# Patient Record
Sex: FEMALE | Race: WHITE | Employment: FULL TIME | URBAN - NONMETROPOLITAN AREA
[De-identification: names, ages, dates, MRNs, and addresses within clinical notes are randomized per-mention and may not be internally consistent; named-entity substitution may affect disease eponyms.]

---

## 2017-11-01 ENCOUNTER — HOSPITAL ENCOUNTER (OUTPATIENT)
Dept: PHYSICAL THERAPY | Age: 52
Setting detail: THERAPIES SERIES
Discharge: HOME | End: 2017-11-01
Payer: COMMERCIAL

## 2017-11-01 PROCEDURE — 97140 MANUAL THERAPY 1/> REGIONS: CPT

## 2017-11-01 PROCEDURE — 97162 PT EVAL MOD COMPLEX 30 MIN: CPT

## 2017-11-01 ASSESSMENT — PAIN DESCRIPTION - LOCATION: LOCATION: BACK

## 2017-11-01 ASSESSMENT — PAIN DESCRIPTION - PAIN TYPE: TYPE: CHRONIC PAIN

## 2017-11-01 ASSESSMENT — PAIN SCALES - GENERAL: PAINLEVEL_OUTOF10: 4

## 2017-11-01 ASSESSMENT — PAIN DESCRIPTION - ORIENTATION: ORIENTATION: LOWER

## 2017-11-01 NOTE — PROGRESS NOTES
steps, balance  Other (Comment): script: eval and treat     Subjective: patient reports she has bilat thigh discomfort into back. She \"threw\" her back out in May 2017 and did it again 1 month later again. feels she does not have strength in her R leg, -she was bent over sorting laundry. Had Chiro at that time-it was helpful. Weakening of legs is reason she came to therapy, will catch R toes at times      Pain:  Patient Currently in Pain: Yes  Pain Assessment: 0-10  Pain Level: 4  Pain Type: Chronic pain  Pain Location: Back  Pain Orientation: Lower  Pain Radiating Towards: anterior thighs will feel over stretched/over worked. 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 Objective  Overall Orientation Status: Within Normal Limits    Follows Commands: Within Functional Limits        Vision: Within Functional Limits    Hearing: Within functional limits      Observation/Palpation  Posture: Fair  Palpation: mod tightness/tenderness bilat gluts and piraformis. R lumbar paraspinals and quadratur lumborum. mod tender over sacrum and L 4 -5. appears to have left-on-left sacral torsion and lumbar vertebra rotated R.   Observation: mod fwd head and shoulders-can correct with VC's. shoulders and pelvis level, min genuvalgus bilat. has orthotics bilat shoes. Spine: trunk flexion to ankle flexion/extension mobility WFL, lateral bend bilat decreased 25%.    ROM RLE: dorsiflexion WFL, Active SLR to approx 40 degrees, passively >70 degrees with R buttock/SI pain, pulling posterior R knee  ROM LLE: dorsiflexion WFL, active SRL>75 degrees    Strength RLE: WFL  Comment: hip flexion 4-/5, abduction, e rotation of hip 4+/5, knee flexion and extension 4+/5    Strength LLE: WFL  Comment: hip flexion 4-/5, abduction, e rotation of hip 4+/5, knee flexion and extension 4+/5         Special Tests: slight difficulty with heel/toe walking due to feet. Positive SAULO R with SI /back discomfort. questionable positive SLR R at 45 degrees. Double knee to chest: 20 reps eliminated symptoms. Trunk rolls: noted a little R SI discomfort roling to the R. prone: no symptoms after 5 min-choses this as position of symptom relief. Ambulation 1  Surface: carpet  Device: No Device  Assistance: Independent  Quality of Gait: decreased massimo  Distance: in clinic      Stairs  Comment: reciprocally wiwth 1 handrail and slight difficulty ascending steps     Balance  Comments: SLS L=28 , R=28 sec, tandem stance >30 sec           Exercises  Exercise 1: manual therpay: muscle energy for left-on-left sacral torsion  Exercise 2: seated piraformis and knee to axilla: 3 ea R  Exercise 3: double knee to chest X 20  Exercise 4: prone X 3 min  Exercise 5: core strength: next visit  Exercise 6: LE strength: next visit          Activity Tolerance:  Activity Tolerance: Patient Tolerated treatment well    Assessment: Body structures, Functions, Activity limitations: Decreased functional mobility , Decreased ROM, Decreased strength  Assessment: patient has limitation related to diagnosis and sacral torsion today. pain eliminated, walking ease mproved, Le strength with hip flexion 4 to 4+/5 after manual therapy today  Prognosis: Excellent  REQUIRES PT FOLLOW UP: Yes    Patient Education:  Patient Education: piraformis and knee to axilla stretch, double knee to chest prone lying  Barriers to Learning: none    Plan:  Times per week: 2x/week  Times per day: Daily  Plan weeks: 4 weeks  Specific instructions for Next Treatment: manual therapy, massage, modalities PRN.  ROm, stretching, strength, 5/5, tolerate 10 reps of 5 core stength ex for symptom control with all ADL's  Long term goal 4: I HEP to achieve above goals    PT G-Codes  Functional Assessment Tool Used: patient specific functional scale  Score: walking 4, steps 4 , balance 6, sit to stand 5: 19/4=4    Megan Wiggins, PT  [id number]

## 2017-11-08 ENCOUNTER — HOSPITAL ENCOUNTER (OUTPATIENT)
Dept: PHYSICAL THERAPY | Age: 52
Setting detail: THERAPIES SERIES
Discharge: HOME | End: 2017-11-08
Payer: COMMERCIAL

## 2017-11-08 PROCEDURE — 97140 MANUAL THERAPY 1/> REGIONS: CPT

## 2017-11-08 PROCEDURE — 97035 APP MDLTY 1+ULTRASOUND EA 15: CPT

## 2017-11-08 ASSESSMENT — PAIN DESCRIPTION - PAIN TYPE: TYPE: CHRONIC PAIN

## 2017-11-08 ASSESSMENT — PAIN SCALES - GENERAL: PAINLEVEL_OUTOF10: 3

## 2017-11-08 ASSESSMENT — PAIN DESCRIPTION - ORIENTATION: ORIENTATION: RIGHT;LOWER

## 2017-11-08 ASSESSMENT — PAIN DESCRIPTION - LOCATION: LOCATION: BACK

## 2017-11-08 NOTE — PROGRESS NOTES
[address] [facility]     Time In: 6481  Time Out: 1640  Minutes: 30  Timed Code Treatment Minutes: 30 Minutes     Date: 2017  Patient Name: Michael Mitchell,  Gender:  female        CSN: [id number]   : 1965  (50 y.o.)       Referring Practitioner: Puja Cabrera MD      Diagnosis: R lumbar radiculopathy  Treatment Diagnosis: R lumbar radiculopathy   Additional Pertinent Hx: history of vertical doris gastectomy                  General:  PT Visit Information  Onset Date: 17  PT Insurance Information: Medical [facility], 36 visits/calendar year  Total # of Visits Approved: 40  Total # of Visits to Date: 2  Plan of Care/Certification Expiration Date: 17  Progress Note Counter:                Subjective:  Chart Reviewed: Yes  Patient assessed for rehabilitation services?: Yes  Family / Caregiver Present: No  Comments: next physician visit PRN. Having nerve test with Dr. Reyes Matar end of November. Symptoms increase with sit to stand, prolonged walking/shopping-legs fatigued, steps, balance  Other (Comment): script: eval and treat     Subjective: patient notes she is a little better. getting up out of a chair a little easier. had 3-4 days this week she had a little R LBP. Pain:  Patient Currently in Pain: Yes  Pain Assessment: 0-10  Pain Level: 3  Pain Type: Chronic pain  Pain Location: Back  Pain Orientation: Right, Lower      Objective  Follows Commands: Within Functional Limits                                                                                                                       Exercises  Exercise 1: manual therpay: muscle energy for left-on-left sacral torsion, left-on right sacral torsion  Exercise 2: supine piraformis and knee to axilla: 3 ea R  Exercise 3: double knee to chest X 10.  trunk roll with OP: 3 X 10 sec bilat  Exercise 7: US bilat LB ad PSIS: 1 mhz, 100%, 1.5 w/cm2 X 8 min (split). Exercise 8: sit trunk flexion: 3 X 10 sec bilat         Activity Tolerance:  Activity Tolerance: Patient Tolerated treatment well    Assessment: Body structures, Functions, Activity limitations: Decreased ROM, Decreased strength, Decreased functional mobility   Assessment: patient feels a little tight in LB at end of session. continued to have LOL sacral torsion, corrected with muscle energy. SLR R mikki to 60 degrees but appeared harder to lift leg-weakness  Prognosis: Excellent  REQUIRES PT FOLLOW UP: Yes    Patient Education:  Patient Education: piraformis and knee to axilla stretch, double knee to chest prone lying. sit trunk flexion lat of knees, trunk rotation with OP                      Plan:  Times per week: 2x/week  Times per day: Daily  Plan weeks: 4 weeks  Specific instructions for Next Treatment: manual therapy, massage, modalities PRN.  ROm, stretching, strength, core strength, balance, gait, body mechanics  Current Treatment Recommendations: Strengthening, ROM, Balance Training, Functional Mobility Training, IADL Training, Gait Training, Stair training, Home Exercise Program, Manual Therapy - Soft Tissue Mobilization, Manual Therapy - Joint Manipulation, Safety Education & Training, Modalities, Equipment Evaluation, Education, & procurement, Patient/Caregiver Education & Training  Plan Comment: progress as martin    Goals:  Patient goals : be able to get up from chair without use of arms, steps reciprocally    Short term goals  Time Frame for Short term goals: see LTG due to short ELOS    Long term goals  Time Frame for Long term goals : 4 weeks  Long term goal 1: decrease reports of pain to occasional and <2/10 to tolerate shopping >1 hour without legs feeling heavy, no reported pain with sit<>stand, bed mobiloity  Long term goal 2: increase active SLR>70 degrees bilat without symptoms for full return to all ADL's  Long term goal 3: increase LE strenght to 5/5, tolerate 10 reps of 5 core stength ex for symptom control with all ADL's  Long term goal 4: I HEP to achieve above goals         Tad Murrieta, PT  [id number]

## 2017-11-10 ENCOUNTER — HOSPITAL ENCOUNTER (OUTPATIENT)
Dept: PHYSICAL THERAPY | Age: 52
Setting detail: THERAPIES SERIES
Discharge: HOME | End: 2017-11-10
Payer: COMMERCIAL

## 2017-11-10 PROCEDURE — 97110 THERAPEUTIC EXERCISES: CPT

## 2017-11-10 ASSESSMENT — PAIN SCALES - GENERAL: PAINLEVEL_OUTOF10: 3

## 2017-11-10 ASSESSMENT — PAIN DESCRIPTION - PAIN TYPE: TYPE: CHRONIC PAIN

## 2017-11-10 ASSESSMENT — PAIN DESCRIPTION - LOCATION: LOCATION: BUTTOCKS

## 2017-11-10 ASSESSMENT — PAIN DESCRIPTION - ORIENTATION: ORIENTATION: RIGHT;LEFT

## 2017-11-10 NOTE — PROGRESS NOTES
[address] [facility]     Time In: 1667  Time Out: 1630  Minutes: 25  Timed Code Treatment Minutes: 25 Minutes     Date: 11/10/2017  Patient Name: Keyonna Pierce,  Gender:  female        CSN: [id number]   : 1965  (50 y.o.)       Referring Practitioner: Esperanza Phillips MD      Diagnosis: R lumbar radiculopathy  Treatment Diagnosis: R lumbar radiculopathy   Additional Pertinent Hx: history of vertical doris gastectomy                  General:  PT Visit Information  Onset Date: 17  PT Insurance Information: Medical [facility], 36 visits/calendar year  Total # of Visits Approved: 40  Total # of Visits to Date: 3  Plan of Care/Certification Expiration Date: 17  Progress Note Counter: 3. /8               Subjective:  Chart Reviewed: Yes  Patient assessed for rehabilitation services?: Yes  Family / Caregiver Present: No  Comments: next physician visit PRN. Having nerve test with Dr. Alisa Rodriguez end of November. Symptoms increase with sit to stand, prolonged walking/shopping-legs fatigued, steps, balance  Other (Comment): script: eval and treat     Subjective: she notes she is sore from stretching and moving furniture around. Pain:  Patient Currently in Pain: Yes  Pain Assessment: 0-10  Pain Level: 3  Pain Type: Chronic pain  Pain Location: Buttocks  Pain Orientation: Right, Left      Objective  Follows Commands: Within Functional Limits                                                                                                                       Exercises  Exercise 5: core strength: ball: bounce X 1 min, rock and roll X 10 ea. LAQ, march X 10 bilat        shoulder flexion and trunk rotation: 2# X 10 ea bilat. isomet abdominals: 5 X 5 sec bilat  Exercise 6: LE strength: heel raise, squat : 10 ea, lunge: 5 bilat.        Exercise 7: bike: seat 3 X 5 min  Exercise 8: sit trunk flexion: 3 X 10 sec bilat seated quad stretch X 2 bilat         Activity Tolerance:  Activity Tolerance: Patient Tolerated treatment well    Assessment: Body structures, Functions, Activity limitations: Decreased ROM, Decreased strength, Decreased functional mobility   Assessment: patient noted less tightness and soreness at end of session. did well with core strength ex today. no pelvic/sacral assymetry noted today  Prognosis: Excellent  REQUIRES PT FOLLOW UP: Yes    Patient Education:  Patient Education: piraformis and knee to axilla stretch, double knee to chest prone lying. sit trunk flexion lat of knees, trunk rotation with OP. heel raise, squat, lunge, SLS, SLR with ab set, isomet abdominals, quad stretch sitting at edge of chair                      Plan:  Times per week: 2x/week  Times per day: Daily  Plan weeks: 4 weeks  Specific instructions for Next Treatment: manual therapy, massage, modalities PRN.  ROm, stretching, strength, core strength, balance, gait, body mechanics  Current Treatment Recommendations: Strengthening, ROM, Balance Training, Functional Mobility Training, IADL Training, Gait Training, Stair training, Home Exercise Program, Manual Therapy - Soft Tissue Mobilization, Manual Therapy - Joint Manipulation, Safety Education & Training, Modalities, Equipment Evaluation, Education, & procurement, Patient/Caregiver Education & Training  Plan Comment: progress as martin    Goals:  Patient goals : be able to get up from chair without use of arms, steps reciprocally    Short term goals  Time Frame for Short term goals: see LTG due to short ELOS    Long term goals  Time Frame for Long term goals : 4 weeks  Long term goal 1: decrease reports of pain to occasional and <2/10 to tolerate shopping >1 hour without legs feeling heavy, no reported pain with sit<>stand, bed mobiloity  Long term goal 2: increase active SLR>70 degrees bilat without symptoms for full return to all ADL's  Long term goal 3: increase LE strenght to 5/5, tolerate 10 reps of 5 core stength ex for symptom control with all ADL's  Long term goal 4: I HEP to achieve above goals         Isabel Brenner, PT  [phone]

## 2017-11-17 ENCOUNTER — HOSPITAL ENCOUNTER (OUTPATIENT)
Dept: PHYSICAL THERAPY | Age: 52
Setting detail: THERAPIES SERIES
Discharge: HOME | End: 2017-11-17
Payer: COMMERCIAL

## 2017-11-17 PROCEDURE — 97110 THERAPEUTIC EXERCISES: CPT

## 2017-11-17 ASSESSMENT — PAIN DESCRIPTION - ORIENTATION: ORIENTATION: LOWER

## 2017-11-17 ASSESSMENT — PAIN SCALES - GENERAL: PAINLEVEL_OUTOF10: 4

## 2017-11-17 ASSESSMENT — PAIN DESCRIPTION - PAIN TYPE: TYPE: CHRONIC PAIN

## 2017-11-17 ASSESSMENT — PAIN DESCRIPTION - LOCATION: LOCATION: BACK

## 2017-11-17 NOTE — PROGRESS NOTES
[address] [facility]     Time In: 6698  Time Out: 2740  Minutes: 30  Timed Code Treatment Minutes: 30 Minutes     Date: 2017  Patient Name: Miguel Valle,  Gender:  female        CSN: [id number]   : 1965  (50 y.o.)       Referring Practitioner: Jovon Jarrett MD      Diagnosis: R lumbar radiculopathy  Treatment Diagnosis: R lumbar radiculopathy   Additional Pertinent Hx: history of vertical doris gastectomy                  General:  PT Visit Information  Onset Date: 17  PT Insurance Information: Medical [facility], 36 visits/calendar year  Total # of Visits Approved: 40  Total # of Visits to Date: 4  Plan of Care/Certification Expiration Date: 17  Progress Note Counter:                Subjective:  Chart Reviewed: Yes  Patient assessed for rehabilitation services?: Yes  Family / Caregiver Present: No  Comments: next physician visit PRN. Having nerve test with Dr. Judson Warren end of November. Symptoms increase with sit to stand, prolonged walking/shopping-legs fatigued, steps, balance  Other (Comment): script: eval and treat     Subjective: back feeling tight in mid back. did alot of sitting today. original back pain is better. Pain:  Patient Currently in Pain: Yes  Pain Assessment: 0-10  Pain Level: 4  Pain Type: Chronic pain  Pain Location: Back  Pain Orientation: Lower      Objective  Follows Commands: Within Functional Limits                                                                                                                       Exercises  Exercise 3: double knee to chest X 10. trunk roll with OP: 3 X 10 sec bilat  Exercise 4: prone X 3 min-had no pain when laying down initially, no pain after this ex  Exercise 5: core strength: ball: bounce X 1 min, rock and roll X 10 ea. , march with UE/LE X 10 bilat        shoulder flexion and trunk rotation, wood chop: 2# X 10 ea bilat.     isomet

## 2017-11-22 ENCOUNTER — HOSPITAL ENCOUNTER (OUTPATIENT)
Dept: PHYSICAL THERAPY | Age: 52
Setting detail: THERAPIES SERIES
Discharge: HOME | End: 2017-11-22
Payer: COMMERCIAL

## 2017-11-22 PROCEDURE — 97110 THERAPEUTIC EXERCISES: CPT

## 2017-11-22 ASSESSMENT — PAIN DESCRIPTION - LOCATION: LOCATION: LEG

## 2017-11-22 ASSESSMENT — PAIN SCALES - GENERAL: PAINLEVEL_OUTOF10: 4

## 2017-11-22 ASSESSMENT — PAIN DESCRIPTION - ORIENTATION: ORIENTATION: RIGHT;LEFT

## 2017-11-22 ASSESSMENT — PAIN DESCRIPTION - PAIN TYPE: TYPE: CHRONIC PAIN

## 2017-11-22 NOTE — PROGRESS NOTES
[address] [facility]     Time In: 9098  Time Out: 1615  Minutes: 30  Timed Code Treatment Minutes: 30 Minutes     Date: 2017  Patient Name: David Bowen,  Gender:  female        CSN: [id number]   : 1965  (50 y.o.)       Referring Practitioner: Jak Grande MD      Diagnosis: R lumbar radiculopathy  Treatment Diagnosis: R lumbar radiculopathy   Additional Pertinent Hx: history of vertical doris gastectomy                  General:  PT Visit Information  Onset Date: 17  PT Insurance Information: [facility], 36 visits/calendar year  Total # of Visits Approved: 40  Total # of Visits to Date: 5  Plan of Care/Certification Expiration Date: 17  Progress Note Counter:                Subjective:  Chart Reviewed: Yes  Patient assessed for rehabilitation services?: Yes  Family / Caregiver Present: No  Comments: next physician visit PRN. Having nerve test with Dr. Basil Blackwell end of November. Symptoms increase with sit to stand, prolonged walking/shopping-legs fatigued, steps, balance  Other (Comment): script: eval and treat     Subjective: back feeling pretty good. Legs feel strained. R doesn't feel as strong in the quads when going up/down steps. Pain:  Patient Currently in Pain: Yes  Pain Assessment: 0-10  Pain Level: 4  Pain Type: Chronic pain  Pain Location: Leg  Pain Orientation: Right, Left  Pain Radiating Towards: lateral thighs and quads-feels over stretches/over worked      Objective  Follows Commands: Within Functional Limits                                                                                                                       Exercises  Exercise 3: double knee to chest X 10. trunk roll with OP: 3 X 10 sec bilat  Exercise 4: prone X 2 min- no leg pain, a little stiffin LB. press-up X 10.          prone alt LE lift, trunk extension: 10 ea  Exercise 6: LE strength: heel raise, squat : 15 ea, lunge: 5 ea fwd, bwd, lateral bilat. Exercise 7: bike: seat 3 X 5 min  Exercise 8: sit trunk flexion: 3 X 10 sec bilat seated quad stretch X 2 bilat. sitting PT/back arch X 10  Exercise 9: standing 4 way hip with ab set: yellow X 10 ea bilat. Exercise 10: body mechanics: next visit  Exercise 11: standing ham and calf stretch: 3 X 15 sec ea bilat. Activity Tolerance:  Activity Tolerance: Patient Tolerated treatment well    Assessment: Body structures, Functions, Activity limitations: Decreased ROM, Decreased strength, Decreased functional mobility   Assessment: no pain at end of session. tolerated new ex well today  Prognosis: Excellent  REQUIRES PT FOLLOW UP: Yes    Patient Education:  Patient Education: piraformis and knee to axilla stretch, double knee to chest prone lying. sit trunk flexion lat of knees, trunk rotation with OP. heel raise, squat, lunge, SLS, SLR with ab set, isomet abdominals, quad stretch sitting at edge of chair, seated pelvic tilt                      Plan:  Times per week: 2x/week  Plan weeks: 4 weeks  Specific instructions for Next Treatment: manual therapy, massage, modalities PRN.  ROm, stretching, strength, core strength, balance, gait, body mechanics  Current Treatment Recommendations: Strengthening, ROM, Balance Training, Functional Mobility Training, IADL Training, Gait Training, Stair training, Home Exercise Program, Manual Therapy - Soft Tissue Mobilization, Manual Therapy - Joint Manipulation, Safety Education & Training, Modalities, Equipment Evaluation, Education, & procurement, Patient/Caregiver Education & Training  Plan Comment: progress as martin    Goals:  Patient goals : be able to get up from chair without use of arms, steps reciprocally    Short term goals  Time Frame for Short term goals: see LTG due to short ELOS    Long term goals  Time Frame for Long term goals : 4 weeks  Long term goal 1: decrease reports of pain to occasional and <2/10 to tolerate shopping >1 hour without legs feeling heavy, no reported pain with sit<>stand, bed mobiloity  Long term goal 2: increase active SLR>70 degrees bilat without symptoms for full return to all ADL's  Long term goal 3: increase LE strenght to 5/5, tolerate 10 reps of 5 core stength ex for symptom control with all ADL's  Long term goal 4: I HEP to achieve above goals         Fred Silver, PT  [id number]

## 2017-11-27 ENCOUNTER — HOSPITAL ENCOUNTER (OUTPATIENT)
Dept: PHYSICAL THERAPY | Age: 52
Setting detail: THERAPIES SERIES
Discharge: HOME | End: 2017-11-27
Payer: COMMERCIAL

## 2017-11-27 PROCEDURE — 97110 THERAPEUTIC EXERCISES: CPT

## 2017-11-27 ASSESSMENT — PAIN DESCRIPTION - FREQUENCY: FREQUENCY: INTERMITTENT

## 2017-11-27 ASSESSMENT — PAIN DESCRIPTION - LOCATION: LOCATION: BACK

## 2017-11-27 ASSESSMENT — PAIN DESCRIPTION - PAIN TYPE: TYPE: CHRONIC PAIN

## 2017-11-27 ASSESSMENT — PAIN SCALES - GENERAL: PAINLEVEL_OUTOF10: 3

## 2017-11-27 ASSESSMENT — PAIN DESCRIPTION - ORIENTATION: ORIENTATION: RIGHT;LOWER

## 2017-11-28 NOTE — PROGRESS NOTES
[address] [facility]     Time In: 1600  Time Out: 1630  Minutes: 30  Timed Code Treatment Minutes: 30 Minutes     Date: 2017  Patient Name: Cesar Paredes,  Gender:  female        CSN: [id number]   : 1965  (50 y.o.)       Referring Practitioner: Bonnielee Fleischer, MD      Diagnosis: R lumbar radiculopathy  Treatment Diagnosis: R lumbar radiculopathy   Additional Pertinent Hx: history of vertical doris gastectomy       General:  PT Visit Information  Onset Date: 17  PT Insurance Information: [facility], 36 visits/calendar year  Total # of Visits Approved: 40  Total # of Visits to Date: 6  Plan of Care/Certification Expiration Date: 17  Progress Note Counter:             Subjective:  Chart Reviewed: Yes  Patient assessed for rehabilitation services?: Yes  Family / Caregiver Present: No  Comments: next physician visit PRN. Having nerve test with Dr. Sarah Millan end of November. Symptoms increase with sit to stand, prolonged walking/shopping-legs fatigued, steps, balance  Other (Comment): script: eval and treat     Subjective: patient reports things are feeling pretty good. LB \"twinging\" today and noted it more as the day goes on. Sit to stand-depends on the time of day and what she is doing-overall better. Noted shopping /long walking much better. Trying to lead up steps with the R LE-improved but not where she wishes it was. Has had a couple of episodes catching R toe but did not fall. Pain:  Patient Currently in Pain: Yes  Pain Assessment: 0-10  Pain Level: 3  Pain Type: Chronic pain  Pain Location: Back  Pain Orientation: Right, Lower  Pain Frequency: Intermittent    Objective  Follows Commands: Within Functional Limits        Exercises  Exercise 6: LE strength: heel raise, squat : 10 ea, lunge: 5 ea fwd, bwd, lateral bilat.        Exercise 10: posturing with dowel josefina at back and sit to [address] [place] [address]

## 2021-04-20 DIAGNOSIS — K90.9 INTESTINAL MALABSORPTION, UNSPECIFIED TYPE: ICD-10-CM

## 2021-04-20 DIAGNOSIS — D50.9 IRON DEFICIENCY ANEMIA, UNSPECIFIED IRON DEFICIENCY ANEMIA TYPE: ICD-10-CM

## 2021-04-26 ENCOUNTER — HOSPITAL ENCOUNTER (OUTPATIENT)
Dept: GENERAL RADIOLOGY | Age: 56
Discharge: HOME | End: 2021-04-26
Payer: COMMERCIAL

## 2021-04-26 VITALS
SYSTOLIC BLOOD PRESSURE: 122 MMHG | TEMPERATURE: 97.5 F | DIASTOLIC BLOOD PRESSURE: 72 MMHG | RESPIRATION RATE: 16 BRPM | OXYGEN SATURATION: 98 % | WEIGHT: 270 LBS | BODY MASS INDEX: 39.99 KG/M2 | HEART RATE: 74 BPM | HEIGHT: 69 IN

## 2021-04-26 DIAGNOSIS — D50.9 IRON DEFICIENCY ANEMIA, UNSPECIFIED IRON DEFICIENCY ANEMIA TYPE: Primary | ICD-10-CM

## 2021-04-26 DIAGNOSIS — K90.9 INTESTINAL MALABSORPTION, UNSPECIFIED TYPE: ICD-10-CM

## 2021-04-26 PROCEDURE — 2580000003 HC RX 258: Performed by: NURSE PRACTITIONER

## 2021-04-26 PROCEDURE — 6360000002 HC RX W HCPCS: Performed by: NURSE PRACTITIONER

## 2021-04-26 PROCEDURE — 96365 THER/PROPH/DIAG IV INF INIT: CPT

## 2021-04-26 RX ORDER — OMEPRAZOLE 40 MG/1
40 CAPSULE, DELAYED RELEASE ORAL DAILY
COMMUNITY

## 2021-04-26 RX ORDER — LEVOTHYROXINE SODIUM 0.03 MG/1
25 TABLET ORAL DAILY
COMMUNITY

## 2021-04-26 RX ORDER — PROPRANOLOL HCL 60 MG
60 CAPSULE, EXTENDED RELEASE 24HR ORAL DAILY
COMMUNITY

## 2021-04-26 RX ORDER — TRIAMTERENE AND HYDROCHLOROTHIAZIDE 37.5; 25 MG/1; MG/1
1 TABLET ORAL DAILY
COMMUNITY
End: 2022-03-18

## 2021-04-26 RX ORDER — LEFLUNOMIDE 20 MG/1
20 TABLET ORAL DAILY
COMMUNITY
End: 2022-03-18

## 2021-04-26 RX ORDER — SERTRALINE HYDROCHLORIDE 100 MG/1
200 TABLET, FILM COATED ORAL DAILY
COMMUNITY

## 2021-04-26 RX ADMIN — IRON SUCROSE 300 MG: 20 INJECTION, SOLUTION INTRAVENOUS at 16:33

## 2021-04-26 SDOH — HEALTH STABILITY: MENTAL HEALTH: HOW OFTEN DO YOU HAVE A DRINK CONTAINING ALCOHOL?: NEVER

## 2021-04-26 NOTE — DISCHARGE INSTR - COC
Elimination:  Continence:   · Bowel: {YES / PI:04558}  · Bladder: {YES / PW:49930}  Urinary Catheter: {Urinary Catheter:531203426}   Colostomy/Ileostomy/Ileal Conduit: {YES / AS:27253}       Date of Last BM: ***  No intake or output data in the 24 hours ending 21 1652  No intake/output data recorded.     Safety Concerns:     [facility] Safety Concerns:511690811}    Impairments/Disabilities:      [facility] Impairments/Disabilities:483491740}    Nutrition Therapy:  Current Nutrition Therapy:   [facility] Diet List:915137807}    Routes of Feeding: {CHP DME Other Feedings:390873363}  Liquids: {Slp liquid thickness:43132}  Daily Fluid Restriction: {CHP DME Yes amt example:239228348}  Last Modified Barium Swallow with Video (Video Swallowing Test): {Done Not Done AWLU:286981125}    Treatments at the Time of Hospital Discharge:   Respiratory Treatments: ***  Oxygen Therapy:  {Therapy; copd oxygen:77409}  Ventilator:    {[facility] Vent CUVX:657629740}    Rehab Therapies: {THERAPEUTIC INTERVENTION:4769555226}  Weight Bearing Status/Restrictions: [address] [place] [address] Weight Bearin}  Other Medical Equipment (for information only, NOT a DME order):  {EQUIPMENT:206002812}  Other Treatments: ***    Patient's personal belongings (please select all that are sent with patient):  {[facility] DME Belongings:824388659}    RN SIGNATURE:  {Esignature:286357250}    CASE MANAGEMENT/SOCIAL WORK SECTION    Inpatient Status Date: ***    Readmission Risk Assessment Score:  Readmission Risk              Risk of Unplanned Readmission:        0           Discharging to Facility/ Agency   · Name:   · Address:  · Phone:  · Fax:    Dialysis Facility (if applicable)   · Name:  · Address:  · Dialysis Schedule:  · Phone:  · Fax:    / signature: {Esignature:439928542}    PHYSICIAN SECTION    Prognosis: {Prognosis:7427848283}    Condition at Discharge: [address] [place] [address] Patient Condition:940590672}    Rehab Potential (if transferring to Rehab): {Prognosis:6467433612}    Recommended Labs or Other Treatments After Discharge: ***    Physician Certification: I certify the above information and transfer of Cinthia Mac  is necessary for the continuing treatment of the diagnosis listed and that she requires {Admit to Appropriate Level of Care:08464} for {GREATER/LESS:316952651} 30 days.      Update Admission H&P: {CHP DME Changes in QAKQE:745344182}    PHYSICIAN SIGNATURE:  {Esignature:980490026}

## 2021-04-26 NOTE — ED NOTES
IV venofer infused and discontinued. For total of 250 ml. bandaid to site. AVS rev'd with pt. And copy given. Pulse regular. Extremities warm. Respirations regular and quiet. Mucous membranes pink & moist. Alert and oriented times 3. No nausea or vomiting. Range of motion within patient's limits. Skin pale, warm and dry.   Calm and cooperative.   __met__ Safety:       (Environmental)   [place] to environment   Ensure ID band is correct and in place/ allergy band as needed   Assess for fall risk   Initiate fall precautions as applicable (fall band, side rails, etc.)   Call light within reach   Bed in low position/ wheels locked    __met__ Pain:        Assess pain level and characteristics   Administer analgesics as ordered   Assess effectiveness of pain management and report to MD as needed    __met__ Knowledge Deficit:   Assess baseline knowledge   Provide teaching at level of understanding   Provide teaching via preferred learning method   Evaluate teaching effectiveness    __met__ Hemodynamic/Respiratory Status:       (Pre and Post Procedure Monitoring)   Assess/Monitor vital signs and LOC   Assess Baseline SpO2 prior to any sedation   Obtain weight/height   Assess vital signs/ LOC until patient meets discharge criteria   Monitor procedure site and notify MD of any issues    __n/a__ Infection-Risk of Central Venous Catheter:   Monitor for infection signs and symptoms (catheter site redness, temperature elevation, etc)   Assess for infection risks   Educate regarding infection prevention   Manage central venous catheter (flushes/ dressing changes per protocol)                Zane Garcia RN  04/26/21 [id number] [facility] Jazmine Head RN  04/26/21 [id number]

## 2021-05-03 ENCOUNTER — HOSPITAL ENCOUNTER (OUTPATIENT)
Dept: GENERAL RADIOLOGY | Age: 56
Discharge: HOME | End: 2021-05-03
Payer: COMMERCIAL

## 2021-05-03 VITALS
SYSTOLIC BLOOD PRESSURE: 117 MMHG | OXYGEN SATURATION: 99 % | HEART RATE: 74 BPM | DIASTOLIC BLOOD PRESSURE: 67 MMHG | TEMPERATURE: 98 F | RESPIRATION RATE: 18 BRPM

## 2021-05-03 DIAGNOSIS — K90.9 INTESTINAL MALABSORPTION, UNSPECIFIED TYPE: ICD-10-CM

## 2021-05-03 DIAGNOSIS — D50.9 IRON DEFICIENCY ANEMIA, UNSPECIFIED IRON DEFICIENCY ANEMIA TYPE: Primary | ICD-10-CM

## 2021-05-03 PROCEDURE — 96365 THER/PROPH/DIAG IV INF INIT: CPT

## 2021-05-03 PROCEDURE — 6360000002 HC RX W HCPCS: Performed by: NURSE PRACTITIONER

## 2021-05-03 PROCEDURE — 2580000003 HC RX 258: Performed by: NURSE PRACTITIONER

## 2021-05-03 RX ADMIN — IRON SUCROSE 300 MG: 20 INJECTION, SOLUTION INTRAVENOUS at 16:25

## 2021-05-03 NOTE — ED NOTES
IV venofer infused and discontinued. Pt. Declines copy of AVS. Pulse regular. Extremities warm. Respirations regular and quiet. Mucous membranes pink & moist. Alert and oriented times 3. No nausea or vomiting. Range of motion within patient's limits. Skin pink, warm and dry. Calm and cooperative.    __x__ Safety:       (Environmental)   [place] to environment   Ensure ID band is correct and in place/ allergy band as needed   Assess for fall risk   Initiate fall precautions as applicable (fall band, side rails, etc.)   Call light within reach   Bed in low position/ wheels locked    __x__ Pain:        Assess pain level and characteristics   Administer analgesics as ordered   Assess effectiveness of pain management and report to MD as needed    __x__ Knowledge Deficit:   Assess baseline knowledge   Provide teaching at level of understanding   Provide teaching via preferred learning method   Evaluate teaching effectiveness    __x__ Hemodynamic/Respiratory Status:       (Pre and Post Procedure Monitoring)   Assess/Monitor vital signs and LOC   Assess Baseline SpO2 prior to any sedation   Obtain weight/height   Assess vital signs/ LOC until patient meets discharge criteria   Monitor procedure site and notify MD of any issues    _na___ Infection-Risk of Central Venous Catheter:   Monitor for infection signs and symptoms (catheter site redness, temperature elevation, etc)   Assess for infection risks   Educate regarding infection prevention   Manage central venous catheter (flushes/ dressing changes per protocol)                Augustine Crook RN  05/03/21 [id number] Chad Dandy, RN  05/03/21 8145

## 2021-05-03 NOTE — ED NOTES
Pt. Sitting quietly in semi-lo's postion, SR up x 2, call light in reach. Pulse regular. Extremities warm. Respirations regular and quiet. Mucous membranes pink & moist. Alert and oriented times 3. No nausea or vomiting. Range of motion within patient's limits. Skin pink, warm and dry. Calm and cooperative.      Anayeli Murillo RN  05/03/21 9870

## 2022-03-10 ENCOUNTER — HOSPITAL ENCOUNTER (OUTPATIENT)
Dept: GENERAL RADIOLOGY | Age: 57
Discharge: HOME | End: 2022-03-10
Payer: COMMERCIAL

## 2022-03-10 VITALS
HEART RATE: 65 BPM | RESPIRATION RATE: 16 BRPM | SYSTOLIC BLOOD PRESSURE: 144 MMHG | TEMPERATURE: 97.4 F | OXYGEN SATURATION: 95 % | DIASTOLIC BLOOD PRESSURE: 70 MMHG

## 2022-03-10 DIAGNOSIS — K90.9 INTESTINAL MALABSORPTION, UNSPECIFIED TYPE: ICD-10-CM

## 2022-03-10 DIAGNOSIS — D50.9 IRON DEFICIENCY ANEMIA, UNSPECIFIED IRON DEFICIENCY ANEMIA TYPE: Primary | ICD-10-CM

## 2022-03-10 PROCEDURE — 6360000002 HC RX W HCPCS: Performed by: FAMILY MEDICINE

## 2022-03-10 PROCEDURE — 96365 THER/PROPH/DIAG IV INF INIT: CPT

## 2022-03-10 PROCEDURE — 2580000003 HC RX 258: Performed by: FAMILY MEDICINE

## 2022-03-10 RX ORDER — SODIUM CHLORIDE 9 MG/ML
INJECTION, SOLUTION INTRAVENOUS CONTINUOUS
Status: CANCELLED | OUTPATIENT
Start: 2022-03-17

## 2022-03-10 RX ORDER — SODIUM CHLORIDE 9 MG/ML
INJECTION, SOLUTION INTRAVENOUS CONTINUOUS
Status: CANCELLED | OUTPATIENT
Start: 2022-03-10

## 2022-03-10 RX ADMIN — IRON SUCROSE 300 MG: 20 INJECTION, SOLUTION INTRAVENOUS at 15:24

## 2022-03-10 NOTE — ED NOTES
IV venofer infused and discontinued. Pulse regular. Extremities warm. Respirations regular and quiet. Mucous membranes pink & moist. Alert and oriented times 3. No nausea or vomiting. Range of motion within patient's limits. Skin pink, warm and dry. Calm and cooperative.  Met: yes   Safety:         (Environmental)   [place] to environment  [employer] ID band is correct and in place/ allergy band as needed   Assess for fall risk   Initiate fall precautions as applicable (fall band, side rails, etc.)   Call light within reach   Bed in low position/ wheels locked    Met: yes   Pain:        Assess pain level and characteristics   Administer analgesics as ordered   Assess effectiveness of pain management and report to MD as needed    Met: yes   Knowledge Deficit:   Assess baseline knowledge   Provide teaching at level of understanding   Provide teaching via preferred learning method   Evaluate teaching effectiveness       Toña Nye RN  03/10/22 1521

## 2022-03-10 NOTE — PROGRESS NOTES
Pt arrives for outpatient infusion. Pt denies any complaitnts at this time. Respirations easy and unlabored. Skin warm and dry.

## 2022-03-18 ENCOUNTER — HOSPITAL ENCOUNTER (OUTPATIENT)
Dept: GENERAL RADIOLOGY | Age: 57
Discharge: HOME | End: 2022-03-18
Payer: COMMERCIAL

## 2022-03-18 VITALS
SYSTOLIC BLOOD PRESSURE: 135 MMHG | TEMPERATURE: 98 F | RESPIRATION RATE: 14 BRPM | WEIGHT: 260 LBS | BODY MASS INDEX: 38.51 KG/M2 | DIASTOLIC BLOOD PRESSURE: 70 MMHG | HEART RATE: 74 BPM | OXYGEN SATURATION: 95 % | HEIGHT: 69 IN

## 2022-03-18 DIAGNOSIS — D50.9 IRON DEFICIENCY ANEMIA, UNSPECIFIED IRON DEFICIENCY ANEMIA TYPE: Primary | ICD-10-CM

## 2022-03-18 DIAGNOSIS — K90.9 INTESTINAL MALABSORPTION, UNSPECIFIED TYPE: ICD-10-CM

## 2022-03-18 PROCEDURE — 2580000003 HC RX 258: Performed by: FAMILY MEDICINE

## 2022-03-18 PROCEDURE — 96365 THER/PROPH/DIAG IV INF INIT: CPT

## 2022-03-18 PROCEDURE — 6360000002 HC RX W HCPCS: Performed by: FAMILY MEDICINE

## 2022-03-18 RX ORDER — SODIUM CHLORIDE 9 MG/ML
INJECTION, SOLUTION INTRAVENOUS CONTINUOUS
OUTPATIENT
Start: 2022-03-24

## 2022-03-18 RX ORDER — SODIUM CHLORIDE 9 MG/ML
INJECTION, SOLUTION INTRAVENOUS CONTINUOUS
Status: ACTIVE | OUTPATIENT
Start: 2022-03-18 | End: 2022-03-18

## 2022-03-18 RX ADMIN — IRON SUCROSE 300 MG: 20 INJECTION, SOLUTION INTRAVENOUS at 15:35

## 2022-03-18 NOTE — PROGRESS NOTES
IV infusion completed. VSS. Denies pain. No suspected reaction to the IV venofer at this time. IV site removed. Dressing applied. Patient discharged.

## 2022-03-18 NOTE — PROGRESS NOTES
_Met___ Safety:       (Environmental)   [place] to environment   Ensure ID band is correct and in place/ allergy band as needed   Assess for fall risk   Initiate fall precautions as applicable (fall band, side rails, etc.)   Call light within reach   Bed in low position/ wheels locked    _Met___ Pain:     Denies pain   Assess pain level and characteristics   Administer analgesics as ordered   Assess effectiveness of pain management and report to MD as needed    _Met___ Knowledge Deficit:   Assess baseline knowledge   Provide teaching at level of understanding   Provide teaching via preferred learning method   Evaluate teaching effectiveness    _Met___ Hemodynamic/Respiratory Status:       (Pre and Post Procedure Monitoring)   Assess/Monitor vital signs and LOC   Assess Baseline SpO2 prior to any sedation   Obtain weight/height   Assess vital signs/ LOC until patient meets discharge criteria   Monitor procedure site and notify MD of any issues    _Met___ Infection-Risk of Peripheral Venous Catheter:   Monitor for infection signs and symptoms (catheter site redness, temperature elevation, etc)   Assess for infection risks   Educate regarding infection prevention   Manage peripheral venous catheter (flushes/ dressing changes per protocol)

## 2022-03-18 NOTE — PROGRESS NOTES
Pt here for outpatient nursing infusion. Respirations regular and easy. Gait steady. Pt alert and oriented X's 3. Taken to exam 3 for venofer infusion.

## 2023-08-02 ENCOUNTER — HOSPITAL ENCOUNTER (OUTPATIENT)
Dept: GENERAL RADIOLOGY | Age: 58
Discharge: HOME | End: 2023-08-02
Attending: ORTHOPAEDIC SURGERY
Payer: COMMERCIAL

## 2023-08-02 ENCOUNTER — HOSPITAL ENCOUNTER (OUTPATIENT)
Age: 58
Discharge: HOME | End: 2023-08-02
Payer: COMMERCIAL

## 2023-08-02 DIAGNOSIS — Z01.811 PRE-OP CHEST EXAM: ICD-10-CM

## 2023-08-02 LAB
ALBUMIN SERPL BCG-MCNC: 3.9 G/DL (ref 3.5–5.1)
ANION GAP SERPL CALC-SCNC: 9 MEQ/L (ref 8–16)
BASOPHILS ABSOLUTE: 0.1 THOU/MM3 (ref 0–0.1)
BASOPHILS NFR BLD AUTO: 1.2 %
BUN SERPL-MCNC: 19 MG/DL (ref 7–22)
CALCIUM SERPL-MCNC: 9.5 MG/DL (ref 8.5–10.5)
CHLORIDE SERPL-SCNC: 102 MEQ/L (ref 98–111)
CO2 SERPL-SCNC: 29 MEQ/L (ref 23–33)
CREAT SERPL-MCNC: 0.5 MG/DL (ref 0.4–1.2)
DEPRECATED RDW RBC AUTO: 47.4 FL (ref 35–45)
EOSINOPHIL NFR BLD AUTO: 3.1 %
EOSINOPHILS ABSOLUTE: 0.2 THOU/MM3 (ref 0–0.4)
ERYTHROCYTE [DISTWIDTH] IN BLOOD BY AUTOMATED COUNT: 13.5 % (ref 11.5–14.5)
GFR SERPL CREATININE-BSD FRML MDRD: > 60 ML/MIN/1.73M2
GLUCOSE SERPL-MCNC: 107 MG/DL (ref 70–108)
HCT VFR BLD AUTO: 44.2 % (ref 37–47)
HGB BLD-MCNC: 14 GM/DL (ref 12–16)
IMM GRANULOCYTES # BLD AUTO: 0.01 THOU/MM3 (ref 0–0.07)
IMM GRANULOCYTES NFR BLD AUTO: 0.2 %
LYMPHOCYTES ABSOLUTE: 1.5 THOU/MM3 (ref 1–4.8)
LYMPHOCYTES NFR BLD AUTO: 30.1 %
MCH RBC QN AUTO: 30 PG (ref 26–33)
MCHC RBC AUTO-ENTMCNC: 31.7 GM/DL (ref 32.2–35.5)
MCV RBC AUTO: 94.8 FL (ref 81–99)
MONOCYTES ABSOLUTE: 0.4 THOU/MM3 (ref 0.4–1.3)
MONOCYTES NFR BLD AUTO: 8.6 %
NEUTROPHILS NFR BLD AUTO: 56.8 %
NRBC BLD AUTO-RTO: 0 /100 WBC
PLATELET # BLD AUTO: 279 THOU/MM3 (ref 130–400)
PMV BLD AUTO: 10.8 FL (ref 9.4–12.4)
POTASSIUM SERPL-SCNC: 4.4 MEQ/L (ref 3.5–5.2)
RBC # BLD AUTO: 4.66 MILL/MM3 (ref 4.2–5.4)
SEGMENTED NEUTROPHILS ABSOLUTE COUNT: 2.9 THOU/MM3 (ref 1.8–7.7)
SODIUM SERPL-SCNC: 140 MEQ/L (ref 135–145)
WBC # BLD AUTO: 5.1 THOU/MM3 (ref 4.8–10.8)

## 2023-08-02 PROCEDURE — 36415 COLL VENOUS BLD VENIPUNCTURE: CPT

## 2023-08-02 PROCEDURE — 93005 ELECTROCARDIOGRAM TRACING: CPT | Performed by: ORTHOPAEDIC SURGERY

## 2023-08-02 PROCEDURE — 87641 MR-STAPH DNA AMP PROBE: CPT

## 2023-08-02 PROCEDURE — 84134 ASSAY OF PREALBUMIN: CPT

## 2023-08-02 PROCEDURE — 71046 X-RAY EXAM CHEST 2 VIEWS: CPT

## 2023-08-02 PROCEDURE — 82040 ASSAY OF SERUM ALBUMIN: CPT

## 2023-08-02 PROCEDURE — 80048 BASIC METABOLIC PNL TOTAL CA: CPT

## 2023-08-02 PROCEDURE — 85025 COMPLETE CBC W/AUTO DIFF WBC: CPT

## 2023-08-03 LAB
MRSA DNA SPEC QL NAA+PROBE: NEGATIVE
PREALB SERPL-MCNC: 21.3 MG/DL (ref 20–40)

## 2023-08-03 PROCEDURE — 93010 ELECTROCARDIOGRAM REPORT: CPT | Performed by: INTERNAL MEDICINE

## 2023-08-04 LAB
EKG ATRIAL RATE: 78 BPM
EKG P AXIS: 52 DEGREES
EKG P-R INTERVAL: 152 MS
EKG Q-T INTERVAL: 374 MS
EKG QRS DURATION: 98 MS
EKG QTC CALCULATION (BAZETT): 426 MS
EKG R AXIS: 6 DEGREES
EKG T AXIS: -34 DEGREES
EKG VENTRICULAR RATE: 78 BPM

## 2023-08-22 RX ORDER — DULOXETIN HYDROCHLORIDE 60 MG/1
1 CAPSULE, DELAYED RELEASE ORAL DAILY
COMMUNITY
Start: 2023-04-18

## 2023-08-22 RX ORDER — DOCUSATE SODIUM 100 MG/1
200 CAPSULE, LIQUID FILLED ORAL DAILY
COMMUNITY

## 2023-08-22 RX ORDER — AMLODIPINE BESYLATE 5 MG/1
1 TABLET ORAL DAILY
COMMUNITY
Start: 2023-05-28

## 2023-08-22 RX ORDER — FEXOFENADINE HCL 180 MG/1
180 TABLET ORAL DAILY
COMMUNITY

## 2023-08-22 RX ORDER — M-VIT,TX,IRON,MINS/CALC/FOLIC 27MG-0.4MG
1 TABLET ORAL DAILY
COMMUNITY

## 2023-08-22 NOTE — PROGRESS NOTES
PAT call attempted, patient unavailable, left message to please call us back at your earliest convenience; [phone]

## 2023-08-22 NOTE — PROGRESS NOTES
Follow all instructions given by your physician  NPO after midnight  Sips of water am of surgery with allowed medications  Bring insurance info and 's license  Wear clean comfortable , loose-fitting clothing  No jewelry or contact lenses to be worn day of surgery  Case for glasses. Shower the night before and the morning of surgery with a liquid antibacterial soap, dry with new fresh clean towel after each shower, no lotions, creams or powder. Clean sheets and pillowcase on bed night before surgery  Bring medications in original bottles    Our pharmacy has a Meds to [facility] program where they will deliver any new prescriptions you may have to your room before you leave. Our Pharmacy will clear it through your insurance; for example (same co pay). This enables you to take your new RX as soon as you need when you get home and avoids stop/wait delays on the way home. Please have a form of payment with you and have someone designated as your Pharmacy contact with their phone # as you may not feel well or still be under the influence of anesthesia. Please refer to the SSI-Surgical Site Infection Flyer you hopefully received in the mail-together we can prevent infections; signs and symptoms reviewed. When discharged be sure you understand how to care for your wound and that you have the Dr./office phone # to call if you have any concerns or questions about your wound.      needed at discharge and someone over 18 to stay with you for 24 hours overnight (surgery may be cancelled if you don't have this)  Report to [facility] on 2nd floor  If you would become ill prior to surgery, please call the surgeon  May have a visitor with you, we request that you limit to 2 visitors in pre-op area  Masks are recommended but not required, new masks at entrance desk  Call [phone] for any questions

## 2023-08-30 ENCOUNTER — ANESTHESIA EVENT (OUTPATIENT)
Dept: OPERATING ROOM | Age: 58
End: 2023-08-30
Payer: COMMERCIAL

## 2023-08-30 ENCOUNTER — ANESTHESIA (OUTPATIENT)
Dept: OPERATING ROOM | Age: 58
End: 2023-08-30
Payer: COMMERCIAL

## 2023-08-30 ENCOUNTER — APPOINTMENT (OUTPATIENT)
Dept: GENERAL RADIOLOGY | Age: 58
End: 2023-08-30
Attending: ORTHOPAEDIC SURGERY
Payer: COMMERCIAL

## 2023-08-30 ENCOUNTER — APPOINTMENT (OUTPATIENT)
Dept: CT IMAGING | Age: 58
End: 2023-08-30
Attending: ORTHOPAEDIC SURGERY
Payer: COMMERCIAL

## 2023-08-30 ENCOUNTER — HOSPITAL ENCOUNTER (OUTPATIENT)
Age: 58
Discharge: HOME | End: 2023-08-31
Attending: ORTHOPAEDIC SURGERY | Admitting: ORTHOPAEDIC SURGERY
Payer: COMMERCIAL

## 2023-08-30 DIAGNOSIS — Z98.1 S/P CERVICAL SPINAL FUSION: Primary | ICD-10-CM

## 2023-08-30 LAB
ABO: NORMAL
ANION GAP SERPL CALC-SCNC: 13 MEQ/L (ref 8–16)
ANTIBODY SCREEN: NORMAL
BASOPHILS ABSOLUTE: 0 THOU/MM3 (ref 0–0.1)
BASOPHILS NFR BLD AUTO: 0.1 %
BUN SERPL-MCNC: 20 MG/DL (ref 7–22)
CALCIUM SERPL-MCNC: 8.8 MG/DL (ref 8.5–10.5)
CHLORIDE SERPL-SCNC: 109 MEQ/L (ref 98–111)
CO2 SERPL-SCNC: 22 MEQ/L (ref 23–33)
CREAT SERPL-MCNC: 0.6 MG/DL (ref 0.4–1.2)
DEPRECATED RDW RBC AUTO: 45 FL (ref 35–45)
EOSINOPHIL NFR BLD AUTO: 0.1 %
EOSINOPHILS ABSOLUTE: 0 THOU/MM3 (ref 0–0.4)
ERYTHROCYTE [DISTWIDTH] IN BLOOD BY AUTOMATED COUNT: 13.3 % (ref 11.5–14.5)
GFR SERPL CREATININE-BSD FRML MDRD: > 60 ML/MIN/1.73M2
GLUCOSE BLD STRIP.AUTO-MCNC: 109 MG/DL (ref 70–108)
GLUCOSE BLD STRIP.AUTO-MCNC: 147 MG/DL (ref 70–108)
GLUCOSE BLD STRIP.AUTO-MCNC: 253 MG/DL (ref 70–108)
GLUCOSE SERPL-MCNC: 201 MG/DL (ref 70–108)
HCT VFR BLD AUTO: 40.4 % (ref 37–47)
HGB BLD-MCNC: 13.3 GM/DL (ref 12–16)
IMM GRANULOCYTES # BLD AUTO: 0.02 THOU/MM3 (ref 0–0.07)
IMM GRANULOCYTES NFR BLD AUTO: 0.3 %
LYMPHOCYTES ABSOLUTE: 0.6 THOU/MM3 (ref 1–4.8)
LYMPHOCYTES NFR BLD AUTO: 8.5 %
MCH RBC QN AUTO: 30 PG (ref 26–33)
MCHC RBC AUTO-ENTMCNC: 32.9 GM/DL (ref 32.2–35.5)
MCV RBC AUTO: 91.2 FL (ref 81–99)
MONOCYTES ABSOLUTE: 0.1 THOU/MM3 (ref 0.4–1.3)
MONOCYTES NFR BLD AUTO: 1 %
NEUTROPHILS NFR BLD AUTO: 90 %
NRBC BLD AUTO-RTO: 0 /100 WBC
PLATELET # BLD AUTO: 243 THOU/MM3 (ref 130–400)
PMV BLD AUTO: 10.2 FL (ref 9.4–12.4)
POTASSIUM SERPL-SCNC: 4 MEQ/L (ref 3.5–5.2)
RBC # BLD AUTO: 4.43 MILL/MM3 (ref 4.2–5.4)
RH FACTOR: NORMAL
SEGMENTED NEUTROPHILS ABSOLUTE COUNT: 6 THOU/MM3 (ref 1.8–7.7)
SODIUM SERPL-SCNC: 144 MEQ/L (ref 135–145)
WBC # BLD AUTO: 6.7 THOU/MM3 (ref 4.8–10.8)

## 2023-08-30 PROCEDURE — 3600000014 HC SURGERY LEVEL 4 ADDTL 15MIN: Performed by: ORTHOPAEDIC SURGERY

## 2023-08-30 PROCEDURE — 72020 X-RAY EXAM OF SPINE 1 VIEW: CPT

## 2023-08-30 PROCEDURE — 2580000003 HC RX 258: Performed by: PHYSICIAN ASSISTANT

## 2023-08-30 PROCEDURE — 7100000001 HC PACU RECOVERY - ADDTL 15 MIN: Performed by: ORTHOPAEDIC SURGERY

## 2023-08-30 PROCEDURE — 6370000000 HC RX 637 (ALT 250 FOR IP): Performed by: NURSE PRACTITIONER

## 2023-08-30 PROCEDURE — 2500000003 HC RX 250 WO HCPCS: Performed by: ORTHOPAEDIC SURGERY

## 2023-08-30 PROCEDURE — 6370000000 HC RX 637 (ALT 250 FOR IP): Performed by: REGISTERED NURSE

## 2023-08-30 PROCEDURE — 85025 COMPLETE CBC W/AUTO DIFF WBC: CPT

## 2023-08-30 PROCEDURE — 6360000002 HC RX W HCPCS: Performed by: PHYSICIAN ASSISTANT

## 2023-08-30 PROCEDURE — 86850 RBC ANTIBODY SCREEN: CPT

## 2023-08-30 PROCEDURE — 6370000000 HC RX 637 (ALT 250 FOR IP): Performed by: ORTHOPAEDIC SURGERY

## 2023-08-30 PROCEDURE — 2720000010 HC SURG SUPPLY STERILE: Performed by: ORTHOPAEDIC SURGERY

## 2023-08-30 PROCEDURE — 7100000000 HC PACU RECOVERY - FIRST 15 MIN: Performed by: ORTHOPAEDIC SURGERY

## 2023-08-30 PROCEDURE — 2580000003 HC RX 258: Performed by: INTERNAL MEDICINE

## 2023-08-30 PROCEDURE — 6360000002 HC RX W HCPCS: Performed by: NURSE PRACTITIONER

## 2023-08-30 PROCEDURE — 2709999900 HC NON-CHARGEABLE SUPPLY: Performed by: ORTHOPAEDIC SURGERY

## 2023-08-30 PROCEDURE — 3700000000 HC ANESTHESIA ATTENDED CARE: Performed by: ORTHOPAEDIC SURGERY

## 2023-08-30 PROCEDURE — 6360000002 HC RX W HCPCS: Performed by: REGISTERED NURSE

## 2023-08-30 PROCEDURE — 3700000001 HC ADD 15 MINUTES (ANESTHESIA): Performed by: ORTHOPAEDIC SURGERY

## 2023-08-30 PROCEDURE — L0120 CERV FLEX N/ADJ FOAM PRE OTS: HCPCS | Performed by: ORTHOPAEDIC SURGERY

## 2023-08-30 PROCEDURE — C9399 UNCLASSIFIED DRUGS OR BIOLOG: HCPCS | Performed by: REGISTERED NURSE

## 2023-08-30 PROCEDURE — 72125 CT NECK SPINE W/O DYE: CPT

## 2023-08-30 PROCEDURE — 86901 BLOOD TYPING SEROLOGIC RH(D): CPT

## 2023-08-30 PROCEDURE — C1713 ANCHOR/SCREW BN/BN,TIS/BN: HCPCS | Performed by: ORTHOPAEDIC SURGERY

## 2023-08-30 PROCEDURE — 82948 REAGENT STRIP/BLOOD GLUCOSE: CPT

## 2023-08-30 PROCEDURE — 2500000003 HC RX 250 WO HCPCS: Performed by: REGISTERED NURSE

## 2023-08-30 PROCEDURE — 36415 COLL VENOUS BLD VENIPUNCTURE: CPT

## 2023-08-30 PROCEDURE — 2580000003 HC RX 258: Performed by: NURSE PRACTITIONER

## 2023-08-30 PROCEDURE — 3600000004 HC SURGERY LEVEL 4 BASE: Performed by: ORTHOPAEDIC SURGERY

## 2023-08-30 PROCEDURE — 80048 BASIC METABOLIC PNL TOTAL CA: CPT

## 2023-08-30 PROCEDURE — 86900 BLOOD TYPING SEROLOGIC ABO: CPT

## 2023-08-30 PROCEDURE — 99222 1ST HOSP IP/OBS MODERATE 55: CPT | Performed by: INTERNAL MEDICINE

## 2023-08-30 DEVICE — GRAFT BNE 2 CC DBM FIBREX: Type: IMPLANTABLE DEVICE | Status: FUNCTIONAL

## 2023-08-30 DEVICE — ALLOGRAFT BNE SPACER SM 0 14X12X7 MM PARL CORTICAL ELEMAX: Type: IMPLANTABLE DEVICE | Status: FUNCTIONAL

## 2023-08-30 DEVICE — 4.0MM VARIABLE ANGLE SCREW, SELF-TAPPING, 16MM
Type: IMPLANTABLE DEVICE | Status: FUNCTIONAL
Brand: ADMIRAL

## 2023-08-30 DEVICE — CERVICAL PLATE, 1 LEVEL, 12MM
Type: IMPLANTABLE DEVICE | Status: FUNCTIONAL
Brand: ADMIRAL

## 2023-08-30 RX ORDER — DOCUSATE SODIUM 100 MG/1
100 CAPSULE, LIQUID FILLED ORAL 2 TIMES DAILY
Status: DISCONTINUED | OUTPATIENT
Start: 2023-08-30 | End: 2023-08-31 | Stop reason: HOSPADM

## 2023-08-30 RX ORDER — LEVOTHYROXINE SODIUM 0.03 MG/1
25 TABLET ORAL DAILY
Status: DISCONTINUED | OUTPATIENT
Start: 2023-08-30 | End: 2023-08-31 | Stop reason: HOSPADM

## 2023-08-30 RX ORDER — FENTANYL CITRATE 50 UG/ML
INJECTION, SOLUTION INTRAMUSCULAR; INTRAVENOUS PRN
Status: DISCONTINUED | OUTPATIENT
Start: 2023-08-30 | End: 2023-08-30 | Stop reason: SDUPTHER

## 2023-08-30 RX ORDER — SODIUM CHLORIDE 9 MG/ML
INJECTION, SOLUTION INTRAVENOUS CONTINUOUS
Status: DISCONTINUED | OUTPATIENT
Start: 2023-08-30 | End: 2023-08-31 | Stop reason: HOSPADM

## 2023-08-30 RX ORDER — LIDOCAINE HYDROCHLORIDE AND EPINEPHRINE 10; 10 MG/ML; UG/ML
INJECTION, SOLUTION INFILTRATION; PERINEURAL PRN
Status: DISCONTINUED | OUTPATIENT
Start: 2023-08-30 | End: 2023-08-30 | Stop reason: ALTCHOICE

## 2023-08-30 RX ORDER — MIDAZOLAM HYDROCHLORIDE 1 MG/ML
INJECTION INTRAMUSCULAR; INTRAVENOUS PRN
Status: DISCONTINUED | OUTPATIENT
Start: 2023-08-30 | End: 2023-08-30 | Stop reason: SDUPTHER

## 2023-08-30 RX ORDER — SODIUM CHLORIDE 9 MG/ML
INJECTION, SOLUTION INTRAVENOUS CONTINUOUS
Status: DISCONTINUED | OUTPATIENT
Start: 2023-08-30 | End: 2023-08-30 | Stop reason: HOSPADM

## 2023-08-30 RX ORDER — OXYCODONE HYDROCHLORIDE AND ACETAMINOPHEN 5; 325 MG/1; MG/1
1 TABLET ORAL EVERY 4 HOURS PRN
Status: DISCONTINUED | OUTPATIENT
Start: 2023-08-30 | End: 2023-08-31 | Stop reason: HOSPADM

## 2023-08-30 RX ORDER — LIDOCAINE HYDROCHLORIDE 40 MG/ML
SOLUTION TOPICAL PRN
Status: DISCONTINUED | OUTPATIENT
Start: 2023-08-30 | End: 2023-08-30 | Stop reason: SDUPTHER

## 2023-08-30 RX ORDER — DIPHENHYDRAMINE HYDROCHLORIDE 50 MG/ML
25 INJECTION INTRAMUSCULAR; INTRAVENOUS EVERY 6 HOURS PRN
Status: DISCONTINUED | OUTPATIENT
Start: 2023-08-30 | End: 2023-08-31 | Stop reason: HOSPADM

## 2023-08-30 RX ORDER — CYCLOBENZAPRINE HCL 10 MG
10 TABLET ORAL 3 TIMES DAILY PRN
Status: DISCONTINUED | OUTPATIENT
Start: 2023-08-30 | End: 2023-08-31 | Stop reason: HOSPADM

## 2023-08-30 RX ORDER — DEXAMETHASONE SODIUM PHOSPHATE 10 MG/ML
INJECTION, EMULSION INTRAMUSCULAR; INTRAVENOUS PRN
Status: DISCONTINUED | OUTPATIENT
Start: 2023-08-30 | End: 2023-08-30 | Stop reason: SDUPTHER

## 2023-08-30 RX ORDER — SODIUM CHLORIDE 9 MG/ML
INJECTION, SOLUTION INTRAVENOUS PRN
Status: DISCONTINUED | OUTPATIENT
Start: 2023-08-30 | End: 2023-08-30 | Stop reason: HOSPADM

## 2023-08-30 RX ORDER — PROPRANOLOL HCL 60 MG
60 CAPSULE, EXTENDED RELEASE 24HR ORAL DAILY
Status: DISCONTINUED | OUTPATIENT
Start: 2023-08-31 | End: 2023-08-31 | Stop reason: HOSPADM

## 2023-08-30 RX ORDER — ENEMA 19; 7 G/133ML; G/133ML
1 ENEMA RECTAL DAILY PRN
Status: DISCONTINUED | OUTPATIENT
Start: 2023-08-30 | End: 2023-08-31 | Stop reason: HOSPADM

## 2023-08-30 RX ORDER — BUDESONIDE 0.5 MG/2ML
0.5 INHALANT ORAL
Status: DISCONTINUED | OUTPATIENT
Start: 2023-08-30 | End: 2023-08-31 | Stop reason: HOSPADM

## 2023-08-30 RX ORDER — BISACODYL 10 MG
10 SUPPOSITORY, RECTAL RECTAL DAILY PRN
Status: DISCONTINUED | OUTPATIENT
Start: 2023-08-30 | End: 2023-08-31 | Stop reason: HOSPADM

## 2023-08-30 RX ORDER — SODIUM CHLORIDE 9 MG/ML
INJECTION, SOLUTION INTRAVENOUS PRN
Status: DISCONTINUED | OUTPATIENT
Start: 2023-08-30 | End: 2023-08-31 | Stop reason: HOSPADM

## 2023-08-30 RX ORDER — SODIUM CHLORIDE 0.9 % (FLUSH) 0.9 %
5-40 SYRINGE (ML) INJECTION PRN
Status: DISCONTINUED | OUTPATIENT
Start: 2023-08-30 | End: 2023-08-31 | Stop reason: HOSPADM

## 2023-08-30 RX ORDER — OMEPRAZOLE 40 MG/1
40 CAPSULE, DELAYED RELEASE ORAL
Status: DISCONTINUED | OUTPATIENT
Start: 2023-08-31 | End: 2023-08-31 | Stop reason: HOSPADM

## 2023-08-30 RX ORDER — SODIUM CHLORIDE 0.9 % (FLUSH) 0.9 %
5-40 SYRINGE (ML) INJECTION EVERY 12 HOURS SCHEDULED
Status: DISCONTINUED | OUTPATIENT
Start: 2023-08-30 | End: 2023-08-30 | Stop reason: HOSPADM

## 2023-08-30 RX ORDER — ONDANSETRON 2 MG/ML
4 INJECTION INTRAMUSCULAR; INTRAVENOUS EVERY 6 HOURS PRN
Status: DISCONTINUED | OUTPATIENT
Start: 2023-08-30 | End: 2023-08-31 | Stop reason: HOSPADM

## 2023-08-30 RX ORDER — SODIUM CHLORIDE 0.9 % (FLUSH) 0.9 %
5-40 SYRINGE (ML) INJECTION PRN
Status: DISCONTINUED | OUTPATIENT
Start: 2023-08-30 | End: 2023-08-30 | Stop reason: HOSPADM

## 2023-08-30 RX ORDER — HYDRALAZINE HYDROCHLORIDE 20 MG/ML
10 INJECTION INTRAMUSCULAR; INTRAVENOUS
Status: DISCONTINUED | OUTPATIENT
Start: 2023-08-30 | End: 2023-08-30 | Stop reason: HOSPADM

## 2023-08-30 RX ORDER — PROPOFOL 10 MG/ML
INJECTION, EMULSION INTRAVENOUS PRN
Status: DISCONTINUED | OUTPATIENT
Start: 2023-08-30 | End: 2023-08-30 | Stop reason: SDUPTHER

## 2023-08-30 RX ORDER — MORPHINE SULFATE 4 MG/ML
4 INJECTION, SOLUTION INTRAMUSCULAR; INTRAVENOUS
Status: DISCONTINUED | OUTPATIENT
Start: 2023-08-30 | End: 2023-08-31 | Stop reason: HOSPADM

## 2023-08-30 RX ORDER — DULOXETIN HYDROCHLORIDE 60 MG/1
60 CAPSULE, DELAYED RELEASE ORAL DAILY
Status: DISCONTINUED | OUTPATIENT
Start: 2023-08-30 | End: 2023-08-31 | Stop reason: HOSPADM

## 2023-08-30 RX ORDER — ROCURONIUM BROMIDE 10 MG/ML
INJECTION, SOLUTION INTRAVENOUS PRN
Status: DISCONTINUED | OUTPATIENT
Start: 2023-08-30 | End: 2023-08-30 | Stop reason: SDUPTHER

## 2023-08-30 RX ORDER — ONDANSETRON 2 MG/ML
INJECTION INTRAMUSCULAR; INTRAVENOUS PRN
Status: DISCONTINUED | OUTPATIENT
Start: 2023-08-30 | End: 2023-08-30 | Stop reason: SDUPTHER

## 2023-08-30 RX ORDER — ONDANSETRON 4 MG/1
4 TABLET, ORALLY DISINTEGRATING ORAL EVERY 8 HOURS PRN
Status: DISCONTINUED | OUTPATIENT
Start: 2023-08-30 | End: 2023-08-31 | Stop reason: HOSPADM

## 2023-08-30 RX ORDER — LABETALOL HYDROCHLORIDE 5 MG/ML
10 INJECTION INTRAVENOUS
Status: DISCONTINUED | OUTPATIENT
Start: 2023-08-30 | End: 2023-08-30 | Stop reason: HOSPADM

## 2023-08-30 RX ORDER — DEXAMETHASONE SODIUM PHOSPHATE 4 MG/ML
8 INJECTION, SOLUTION INTRA-ARTICULAR; INTRALESIONAL; INTRAMUSCULAR; INTRAVENOUS; SOFT TISSUE EVERY 8 HOURS
Status: COMPLETED | OUTPATIENT
Start: 2023-08-30 | End: 2023-08-31

## 2023-08-30 RX ORDER — OXYCODONE HYDROCHLORIDE AND ACETAMINOPHEN 5; 325 MG/1; MG/1
2 TABLET ORAL EVERY 4 HOURS PRN
Status: DISCONTINUED | OUTPATIENT
Start: 2023-08-30 | End: 2023-08-31 | Stop reason: HOSPADM

## 2023-08-30 RX ORDER — ONDANSETRON 2 MG/ML
4 INJECTION INTRAMUSCULAR; INTRAVENOUS
Status: DISCONTINUED | OUTPATIENT
Start: 2023-08-30 | End: 2023-08-30 | Stop reason: HOSPADM

## 2023-08-30 RX ORDER — MORPHINE SULFATE 2 MG/ML
2 INJECTION, SOLUTION INTRAMUSCULAR; INTRAVENOUS
Status: DISCONTINUED | OUTPATIENT
Start: 2023-08-30 | End: 2023-08-31 | Stop reason: HOSPADM

## 2023-08-30 RX ORDER — POLYETHYLENE GLYCOL 3350 17 G/17G
17 POWDER, FOR SOLUTION ORAL DAILY
Status: DISCONTINUED | OUTPATIENT
Start: 2023-08-30 | End: 2023-08-31 | Stop reason: HOSPADM

## 2023-08-30 RX ORDER — AMLODIPINE BESYLATE 5 MG/1
5 TABLET ORAL DAILY
Status: DISCONTINUED | OUTPATIENT
Start: 2023-08-31 | End: 2023-08-30

## 2023-08-30 RX ORDER — HYDRALAZINE HYDROCHLORIDE 20 MG/ML
10 INJECTION INTRAMUSCULAR; INTRAVENOUS EVERY 6 HOURS PRN
Status: CANCELLED | OUTPATIENT
Start: 2023-08-30

## 2023-08-30 RX ORDER — ACETAMINOPHEN 325 MG/1
650 TABLET ORAL EVERY 4 HOURS PRN
Status: DISCONTINUED | OUTPATIENT
Start: 2023-08-30 | End: 2023-08-31 | Stop reason: HOSPADM

## 2023-08-30 RX ORDER — SODIUM CHLORIDE 0.9 % (FLUSH) 0.9 %
5-40 SYRINGE (ML) INJECTION EVERY 12 HOURS SCHEDULED
Status: DISCONTINUED | OUTPATIENT
Start: 2023-08-30 | End: 2023-08-31 | Stop reason: HOSPADM

## 2023-08-30 RX ORDER — PANTOPRAZOLE SODIUM 40 MG/1
40 TABLET, DELAYED RELEASE ORAL
Status: DISCONTINUED | OUTPATIENT
Start: 2023-08-31 | End: 2023-08-30 | Stop reason: CLARIF

## 2023-08-30 RX ORDER — AMLODIPINE BESYLATE 10 MG/1
10 TABLET ORAL DAILY
Status: DISCONTINUED | OUTPATIENT
Start: 2023-08-30 | End: 2023-08-31 | Stop reason: HOSPADM

## 2023-08-30 RX ORDER — OMEPRAZOLE 20 MG/1
40 CAPSULE, DELAYED RELEASE ORAL DAILY
Status: DISCONTINUED | OUTPATIENT
Start: 2023-08-30 | End: 2023-08-30 | Stop reason: CLARIF

## 2023-08-30 RX ADMIN — FENTANYL CITRATE 50 MCG: 50 INJECTION, SOLUTION INTRAMUSCULAR; INTRAVENOUS at 07:43

## 2023-08-30 RX ADMIN — CYCLOBENZAPRINE 10 MG: 10 TABLET, FILM COATED ORAL at 13:02

## 2023-08-30 RX ADMIN — CEFAZOLIN 3000 MG: 10 INJECTION, POWDER, FOR SOLUTION INTRAVENOUS at 21:32

## 2023-08-30 RX ADMIN — LIDOCAINE HYDROCHLORIDE 4 ML: 40 SOLUTION TOPICAL at 07:19

## 2023-08-30 RX ADMIN — ACETAMINOPHEN 650 MG: 325 TABLET ORAL at 17:04

## 2023-08-30 RX ADMIN — FENTANYL CITRATE 50 MCG: 50 INJECTION, SOLUTION INTRAMUSCULAR; INTRAVENOUS at 07:15

## 2023-08-30 RX ADMIN — FENTANYL CITRATE 50 MCG: 50 INJECTION, SOLUTION INTRAMUSCULAR; INTRAVENOUS at 08:05

## 2023-08-30 RX ADMIN — SODIUM CHLORIDE: 9 INJECTION, SOLUTION INTRAVENOUS at 21:42

## 2023-08-30 RX ADMIN — DEXAMETHASONE SODIUM PHOSPHATE 8 MG: 4 INJECTION, SOLUTION INTRA-ARTICULAR; INTRALESIONAL; INTRAMUSCULAR; INTRAVENOUS; SOFT TISSUE at 14:37

## 2023-08-30 RX ADMIN — CEFAZOLIN 3000 MG: 10 INJECTION, POWDER, FOR SOLUTION INTRAVENOUS at 14:40

## 2023-08-30 RX ADMIN — DEXAMETHASONE SODIUM PHOSPHATE 10 MG: 10 INJECTION, EMULSION INTRAMUSCULAR; INTRAVENOUS at 07:19

## 2023-08-30 RX ADMIN — CYCLOBENZAPRINE 10 MG: 10 TABLET, FILM COATED ORAL at 21:27

## 2023-08-30 RX ADMIN — ACETAMINOPHEN 650 MG: 325 TABLET ORAL at 13:02

## 2023-08-30 RX ADMIN — FENTANYL CITRATE 50 MCG: 50 INJECTION, SOLUTION INTRAMUSCULAR; INTRAVENOUS at 07:19

## 2023-08-30 RX ADMIN — DOCUSATE SODIUM 100 MG: 100 CAPSULE, LIQUID FILLED ORAL at 13:13

## 2023-08-30 RX ADMIN — ROCURONIUM BROMIDE 10 MG: 10 INJECTION INTRAVENOUS at 07:29

## 2023-08-30 RX ADMIN — DEXAMETHASONE SODIUM PHOSPHATE 8 MG: 4 INJECTION, SOLUTION INTRA-ARTICULAR; INTRALESIONAL; INTRAMUSCULAR; INTRAVENOUS; SOFT TISSUE at 21:27

## 2023-08-30 RX ADMIN — MIDAZOLAM 2 MG: 1 INJECTION INTRAMUSCULAR; INTRAVENOUS at 07:15

## 2023-08-30 RX ADMIN — SODIUM CHLORIDE: 9 INJECTION, SOLUTION INTRAVENOUS at 06:11

## 2023-08-30 RX ADMIN — ONDANSETRON 4 MG: 2 INJECTION INTRAMUSCULAR; INTRAVENOUS at 09:06

## 2023-08-30 RX ADMIN — AMLODIPINE BESYLATE 10 MG: 10 TABLET ORAL at 16:06

## 2023-08-30 RX ADMIN — ACETAMINOPHEN 650 MG: 325 TABLET ORAL at 21:27

## 2023-08-30 RX ADMIN — DOCUSATE SODIUM 100 MG: 100 CAPSULE, LIQUID FILLED ORAL at 13:14

## 2023-08-30 RX ADMIN — ROCURONIUM BROMIDE 10 MG: 10 INJECTION INTRAVENOUS at 07:44

## 2023-08-30 RX ADMIN — SUGAMMADEX 400 MG: 100 INJECTION, SOLUTION INTRAVENOUS at 09:09

## 2023-08-30 RX ADMIN — ROCURONIUM BROMIDE 50 MG: 10 INJECTION INTRAVENOUS at 07:19

## 2023-08-30 RX ADMIN — LEVOTHYROXINE SODIUM 25 MCG: 25 TABLET ORAL at 11:09

## 2023-08-30 RX ADMIN — Medication 3000 MG: at 07:06

## 2023-08-30 RX ADMIN — PROPOFOL 200 MG: 10 INJECTION, EMULSION INTRAVENOUS at 07:19

## 2023-08-30 RX ADMIN — DULOXETINE HYDROCHLORIDE 60 MG: 60 CAPSULE, DELAYED RELEASE ORAL at 11:09

## 2023-08-30 ASSESSMENT — PAIN DESCRIPTION - ORIENTATION
ORIENTATION: ANTERIOR
ORIENTATION: ANTERIOR

## 2023-08-30 ASSESSMENT — PAIN DESCRIPTION - ONSET
ONSET: ON-GOING
ONSET: ON-GOING

## 2023-08-30 ASSESSMENT — PAIN DESCRIPTION - PAIN TYPE
TYPE: SURGICAL PAIN
TYPE: SURGICAL PAIN

## 2023-08-30 ASSESSMENT — PAIN - FUNCTIONAL ASSESSMENT
PAIN_FUNCTIONAL_ASSESSMENT: ACTIVITIES ARE NOT PREVENTED
PAIN_FUNCTIONAL_ASSESSMENT: ACTIVITIES ARE NOT PREVENTED

## 2023-08-30 ASSESSMENT — PAIN DESCRIPTION - DESCRIPTORS
DESCRIPTORS: ACHING
DESCRIPTORS: ACHING

## 2023-08-30 ASSESSMENT — PAIN SCALES - GENERAL
PAINLEVEL_OUTOF10: 2
PAINLEVEL_OUTOF10: 2
PAINLEVEL_OUTOF10: 0
PAINLEVEL_OUTOF10: 3
PAINLEVEL_OUTOF10: 2
PAINLEVEL_OUTOF10: 0
PAINLEVEL_OUTOF10: 2
PAINLEVEL_OUTOF10: 2

## 2023-08-30 ASSESSMENT — PAIN DESCRIPTION - FREQUENCY
FREQUENCY: INTERMITTENT
FREQUENCY: INTERMITTENT

## 2023-08-30 ASSESSMENT — PAIN DESCRIPTION - LOCATION
LOCATION: NECK
LOCATION: NECK

## 2023-08-30 NOTE — H&P
I have reviewed the history and physical and examined the patient. I find no relevant changes. I have reviewed with the patient and/or family members, during the preoperative office visit the risks, benefits, and alternatives to the procedure.     Valeri Vitale MD

## 2023-08-30 NOTE — PROGRESS NOTES
0237 - pt arrives to pacu, respirations unlabored on 8 L simple mask, npa in place, pt unresponsive post anesthesia, VSS    0925 - npa removed, pt denies pain or nausea    0935 - pt resting comfortably, pt denies pain    0940  - report called to [phone] [facility] - pt meets criteria for discharge from pacu at this time, pt transported to [facility] in stable condition

## 2023-08-30 NOTE — CARE COORDINATION
Case Management Assessment  Initial Evaluation    Date/Time of Evaluation: 8/30/2023 2:36 PM  Assessment Completed by: Griffin Armando RN    If patient is discharged prior to next notation, then this note serves as note for discharge by case management. Patient Name: Guzman Yung                   YOB: 1965  Diagnosis: Spinal stenosis, cervicothoracic region [M48.03]  S/P cervical spinal fusion [Z98.1]                   Date / Time: 8/30/2023  5:21 AM  Location: [address] [place] [address]     Patient Admission Status: Outpatient in a bed   Readmission Risk Low 0-14, Mod 15-19), High > 20: No data recorded  Current PCP: Vanessa Ball MD  PCP verified by CM? Yes    Chart Reviewed: Yes      History Provided by: Patient  Patient Orientation: Alert and Oriented    Patient Cognition: Alert    Hospitalization in the last 30 days (Readmission):  No    If yes, Readmission Assessment in  Navigator will be completed. Advance Directives:      Code Status: Full Code   Patient's Primary Decision Maker is: Legal Next of Kin      Discharge Planning:    Patient lives with: Spouse/Significant Other Type of Home: House  Primary Care Giver: Self  Patient Support Systems include: Spouse/Significant Other, Family Members   Current Financial resources: Other (Comment) (commerical insurance)  Current community resources: None  Current services prior to admission: None            Current DME:              Type of Home Care services:  None    ADLS  Prior functional level: Independent in ADLs/IADLs  Current functional level: Assistance with the following:, Housework, Shopping    Family can provide assistance at DC: Yes  Would you like Case Management to discuss the discharge plan with any other family members/significant others, and if so, who?  Yes ( present)  Plans to Return to Present Housing: Yes  Other Identified Issues/Barriers to RETURNING to current housing: n/a  Potential Assistance needed at discharge: [facility] [address] [place]

## 2023-08-30 NOTE — H&P
[place] [address]                       PREOPERATIVE HISTORY AND PHYSICAL    PATIENT NAME: Ondina Brar                   :        1965  MED REC NO:   [id number]                           ROOM:  ACCOUNT NO:   [de-identified]                           ADMIT DATE: 2023  PROVIDER:     Ivelisse Cat PA-C    PLANNED SURGERY:  Removal of plate C5 to C7 with ACDF of C7 to T1 with  allograft bone and plating under the care of Dr. Olivia Monique on the  date of 2023 at [facility]. CHIEF COMPLAINT:  Cervical pain and left upper extremity radicular pain. HISTORY OF PRESENT ILLNESS:  The patient is a 65-year-old female who  presents to the office today with complaints of significant cervical  pain and the left upper extremity radicular pain. She has had a  previous history of an ACDF C5 to C7 done by Dr. Armnado Naylor in 10/2021 at [facility]. She did well after this operation, but then began to go on to  experience symptoms of left upper extremity radiculopathy. These  symptoms included traveling pain to the left upper extremity into the  shoulder blades affecting her pinky and ring finger, worse on the  left-hand side. EMG studies did indicate moderate bilateral carpal  tunnel syndrome and moderate bilateral ulnar neuropathy. Dr. Sonam Perez has  been followed her with this. Ultimately, she had failed to improve. She has had treatment consisting of the use of ibuprofen, muscle  relaxers, ice, heat, and chiropractic care without significant relief. With her failure to improve, she has elected to proceed with further  operative management. She has been medically cleared by her family  provider, Dr. Dwaine Koyanagi, to undergo this operation. DRUG ALLERGIES:  NO KNOWN DRUG ALLERGIES. PAST MEDICAL HISTORY:  Includes gastroesophageal reflux disease,  hypertension, rheumatoid arthritis, and _____.     PAST SURGICAL plate  at C5 to C7 with an ACDF of C7-T1 under the care of Dr. Kellen Herzog on  the date of 08/30/2023 at [address] [place] [address]    D: 08/29/2023 16:12:59       T: 08/29/2023 19:21:39     [email or web address]  Job#: [id number]     Doc#: [id number]    CC:

## 2023-08-30 NOTE — BRIEF OP NOTE
Brief Postoperative Note      Patient: Jackolyn Koyanagi  YOB: 1965  MRN: [id number]    Date of Procedure: 8/30/2023    Pre-Op Diagnosis Codes:     * Spinal stenosis, cervicothoracic region [M48.03]    Post-Op Diagnosis: Same       Procedure(s):  Removal of Plate K9-Y5, ACDF F4-W1    Surgeon(s):  Stefanie Collazo MD    Assistant:  Elisa Holcomb CNP-FAC    Anesthesia: General    Estimated Blood Loss (mL): less than 50     Complications: None    Specimens:   * No specimens in log *    Implants:  Implant Name Type Inv.  Item Serial No.  Lot No. LRB No. Used Action   ALLOGRAFT BNE SPACER SM 0 [id number] MM [employer] - [id number]  ALLOGRAFT BNE SPACER SM 0 [id number] MM [employer]LEMAX  [employer] [id number] N/A 1 Implanted   GRAFT BNE 2 CC DBM FIBREX - [id number]  GRAFT BNE 2 CC DBM FIBREX  [employer]  N/A 1 Implanted   SCREW SPNL ST 4X16 MM [employer]IR[employer] [id number] - [id number]  SCREW SPNL ST 4X16 MM [employer] [id number]  [address]  N/A 4 Implanted   PLATE SPNL 1 LEVEL 12 MM CERV ADMIRAL [id number] - [id number]  PLATE SPNL 1 LEVEL 12 MM CERV ADMIRAL [id number]  LYYN  N/A 1 Implanted         Drains:   Closed/Suction Drain Anterior Neck Bulb (Active)       Findings: same      Electronically signed by Stefanie Collazo MD on 8/30/2023 at 9:02 AM

## 2023-08-30 NOTE — H&P
may contain minor errors which are inherent in voice recognition technology. **      Final report electronically signed by Dr Socorro Brown on 8/30/2023 9:19 AM        CT CERVICAL SPINE WO CONTRAST    Result Date: 8/30/2023  PROCEDURE: CT CERVICAL SPINE WO CONTRAST CLINICAL INFORMATION: post ACDF C7-T1. COMPARISON: Intraoperative x-ray 8/30/2023. TECHNIQUE: 3 mm noncontrast axial images were obtained through the cervical spine with sagittal and coronal reconstructions. All CT scans at this facility use dose modulation, iterative reconstruction, and/or weight-based dosing when appropriate to reduce radiation dose to as low as reasonably achievable. FINDINGS: The patient has hardware and a solid osseous fusion across the discs at the C5-6 and C6-7 levels. Hardware has been removed. Screw tracks are seen in the vertebral bodies. There is new hardware with screws anteriorly in the C7 and T1 levels. There is a new disc spacer. Anterior to this, there is a surgical drain. There is no surrounding hematoma or fluid collection. The cervical vertebral bodies are normally aligned. There is no fracture. There is no prevertebral soft tissue swelling. No degenerative changes are noted. No suspicious osseous lesions are present. There are no suspicious findings in the cervical soft tissues. There are no suspicious findings in the lung apices. Status post cervical fusion of the C7-T1 level with an expected postoperative appearance. **This report has been created using voice recognition software. It may contain minor errors which are inherent in voice recognition technology. ** Final report electronically signed by Dr. Mable Martinez on 8/30/2023 11:11 AM    XR CERVICAL SPINE 1 VW    Result Date: 8/30/2023  PROCEDURE: XR CERVICAL SPINE 1 VW CLINICAL INFORMATION: 56-year-old female who underwent cervical spine surgery. COMPARISON: No prior study.  TECHNIQUE: One crossfire lateral film of the cervical spine was obtained during surgery. FINDINGS: The patient is intubated. Cervical disc spacers are present at C5-C6 and C6-C7. There is no fracture. No subluxation. Cervical disc spacers are present at C5-C6. Please refer to the operative note for further details. **This report has been created using voice recognition software. It may contain minor errors which are inherent in voice recognition technology. ** Final report electronically signed by Dr Trisha Acuña on 8/30/2023 9:19 AM    [address] [facility] [address]    Electronically signed by Clau Carr DO on 8/30/2023 at 2:17 PM

## 2023-08-30 NOTE — ANESTHESIA POSTPROCEDURE EVALUATION
Department of Anesthesiology  Postprocedure Note    Patient: Florinda Matthew  MRN: [id number]  YOB: 1965  Date of evaluation: 8/30/2023      Procedure Summary     Date: 08/30/23 Room / Location: [address]    Anesthesia Start: 0715 Anesthesia Stop: 6497    Procedure: Removal of Plate R4-O1, ACDF G5-F6 (Neck) Diagnosis:       Spinal stenosis, cervicothoracic region      (Spinal stenosis, cervicothoracic region [M48.03])    Surgeons: Jesika Taveras MD Responsible Provider: Josué Peraza MD    Anesthesia Type: general ASA Status: 3          Anesthesia Type: No value filed.     Katlyn Phase I: Katlyn Score: 10    Katlyn Phase II:        Anesthesia Post Evaluation    Patient location during evaluation: PACU  Patient participation: complete - patient participated  Level of consciousness: awake and alert  Airway patency: patent  Nausea & Vomiting: no nausea  Complications: no  Cardiovascular status: blood pressure returned to baseline and hemodynamically stable  Respiratory status: acceptable and spontaneous ventilation  Hydration status: euvolemic  Pain management: adequate

## 2023-08-30 NOTE — PROGRESS NOTES
Patient admitted to [facility] room 6 with  at bedside. Bed in low position side rails up call light in reach. Patient denies questions at this time.

## 2023-08-31 VITALS
BODY MASS INDEX: 43.19 KG/M2 | HEIGHT: 68 IN | OXYGEN SATURATION: 93 % | SYSTOLIC BLOOD PRESSURE: 151 MMHG | RESPIRATION RATE: 16 BRPM | WEIGHT: 285 LBS | TEMPERATURE: 98.1 F | HEART RATE: 86 BPM | DIASTOLIC BLOOD PRESSURE: 82 MMHG

## 2023-08-31 LAB
BASOPHILS ABSOLUTE: 0 THOU/MM3 (ref 0–0.1)
BASOPHILS NFR BLD AUTO: 0.1 %
DEPRECATED RDW RBC AUTO: 45.7 FL (ref 35–45)
EOSINOPHIL NFR BLD AUTO: 0 %
EOSINOPHILS ABSOLUTE: 0 THOU/MM3 (ref 0–0.4)
ERYTHROCYTE [DISTWIDTH] IN BLOOD BY AUTOMATED COUNT: 13.5 % (ref 11.5–14.5)
GLUCOSE BLD STRIP.AUTO-MCNC: 117 MG/DL (ref 70–108)
HCT VFR BLD AUTO: 38.5 % (ref 37–47)
HGB BLD-MCNC: 12.3 GM/DL (ref 12–16)
IMM GRANULOCYTES # BLD AUTO: 0.03 THOU/MM3 (ref 0–0.07)
IMM GRANULOCYTES NFR BLD AUTO: 0.3 %
LYMPHOCYTES ABSOLUTE: 0.8 THOU/MM3 (ref 1–4.8)
LYMPHOCYTES NFR BLD AUTO: 8.7 %
MCH RBC QN AUTO: 29.5 PG (ref 26–33)
MCHC RBC AUTO-ENTMCNC: 31.9 GM/DL (ref 32.2–35.5)
MCV RBC AUTO: 92.3 FL (ref 81–99)
MONOCYTES ABSOLUTE: 0.2 THOU/MM3 (ref 0.4–1.3)
MONOCYTES NFR BLD AUTO: 2.1 %
NEUTROPHILS NFR BLD AUTO: 88.8 %
NRBC BLD AUTO-RTO: 0 /100 WBC
PLATELET # BLD AUTO: 251 THOU/MM3 (ref 130–400)
PMV BLD AUTO: 10.5 FL (ref 9.4–12.4)
RBC # BLD AUTO: 4.17 MILL/MM3 (ref 4.2–5.4)
SEGMENTED NEUTROPHILS ABSOLUTE COUNT: 7.8 THOU/MM3 (ref 1.8–7.7)
WBC # BLD AUTO: 8.8 THOU/MM3 (ref 4.8–10.8)

## 2023-08-31 PROCEDURE — 82948 REAGENT STRIP/BLOOD GLUCOSE: CPT

## 2023-08-31 PROCEDURE — 97530 THERAPEUTIC ACTIVITIES: CPT

## 2023-08-31 PROCEDURE — 36415 COLL VENOUS BLD VENIPUNCTURE: CPT

## 2023-08-31 PROCEDURE — 94640 AIRWAY INHALATION TREATMENT: CPT

## 2023-08-31 PROCEDURE — 97165 OT EVAL LOW COMPLEX 30 MIN: CPT

## 2023-08-31 PROCEDURE — 6360000002 HC RX W HCPCS: Performed by: NURSE PRACTITIONER

## 2023-08-31 PROCEDURE — 85025 COMPLETE CBC W/AUTO DIFF WBC: CPT

## 2023-08-31 PROCEDURE — 6370000000 HC RX 637 (ALT 250 FOR IP): Performed by: NURSE PRACTITIONER

## 2023-08-31 PROCEDURE — 97535 SELF CARE MNGMENT TRAINING: CPT

## 2023-08-31 RX ORDER — OXYCODONE HYDROCHLORIDE AND ACETAMINOPHEN 5; 325 MG/1; MG/1
1 TABLET ORAL EVERY 4 HOURS PRN
Qty: 42 TABLET | Refills: 0 | Status: SHIPPED | OUTPATIENT
Start: 2023-08-31 | End: 2023-09-07

## 2023-08-31 RX ORDER — CYCLOBENZAPRINE HCL 10 MG
10 TABLET ORAL 3 TIMES DAILY PRN
Qty: 50 TABLET | Refills: 0 | Status: SHIPPED | OUTPATIENT
Start: 2023-08-31 | End: 2023-09-10

## 2023-08-31 RX ADMIN — DULOXETINE HYDROCHLORIDE 60 MG: 60 CAPSULE, DELAYED RELEASE ORAL at 09:10

## 2023-08-31 RX ADMIN — DEXAMETHASONE SODIUM PHOSPHATE 8 MG: 4 INJECTION, SOLUTION INTRA-ARTICULAR; INTRALESIONAL; INTRAMUSCULAR; INTRAVENOUS; SOFT TISSUE at 06:25

## 2023-08-31 RX ADMIN — AMLODIPINE BESYLATE 10 MG: 10 TABLET ORAL at 09:10

## 2023-08-31 RX ADMIN — PROPRANOLOL HYDROCHLORIDE 60 MG: 60 CAPSULE, EXTENDED RELEASE ORAL at 09:10

## 2023-08-31 RX ADMIN — CYCLOBENZAPRINE 10 MG: 10 TABLET, FILM COATED ORAL at 03:58

## 2023-08-31 RX ADMIN — ACETAMINOPHEN 650 MG: 325 TABLET ORAL at 03:58

## 2023-08-31 RX ADMIN — BUDESONIDE 500 MCG: 0.5 INHALANT RESPIRATORY (INHALATION) at 08:16

## 2023-08-31 RX ADMIN — OMEPRAZOLE 40 MG: 40 CAPSULE, DELAYED RELEASE ORAL at 06:23

## 2023-08-31 RX ADMIN — DOCUSATE SODIUM 100 MG: 100 CAPSULE, LIQUID FILLED ORAL at 09:10

## 2023-08-31 RX ADMIN — ACETAMINOPHEN 650 MG: 325 TABLET ORAL at 09:11

## 2023-08-31 RX ADMIN — LEVOTHYROXINE SODIUM 25 MCG: 25 TABLET ORAL at 06:22

## 2023-08-31 ASSESSMENT — PAIN DESCRIPTION - FREQUENCY: FREQUENCY: INTERMITTENT

## 2023-08-31 ASSESSMENT — PAIN DESCRIPTION - ORIENTATION
ORIENTATION: ANTERIOR
ORIENTATION: ANTERIOR

## 2023-08-31 ASSESSMENT — PAIN SCALES - GENERAL
PAINLEVEL_OUTOF10: 3
PAINLEVEL_OUTOF10: 1
PAINLEVEL_OUTOF10: 1

## 2023-08-31 ASSESSMENT — PAIN DESCRIPTION - DESCRIPTORS
DESCRIPTORS: DISCOMFORT
DESCRIPTORS: NAGGING

## 2023-08-31 ASSESSMENT — PAIN DESCRIPTION - PAIN TYPE
TYPE: SURGICAL PAIN
TYPE: CHRONIC PAIN

## 2023-08-31 ASSESSMENT — PAIN DESCRIPTION - ONSET: ONSET: ON-GOING

## 2023-08-31 ASSESSMENT — PAIN DESCRIPTION - LOCATION
LOCATION: NECK
LOCATION: HEAD

## 2023-08-31 NOTE — CARE COORDINATION
8/31/23, 11:48 AM EDT  Late Entry    Patient goals/plan/ treatment preferences discussed by  and . Patient goals/plan/ treatment preferences reviewed with patient/ family. Patient/ family verbalize understanding of discharge plan and are in agreement with goal/plan/treatment preferences. Understanding was demonstrated using the teach back method. AVS provided by RN at time of discharge, which includes all necessary medical information pertaining to the patients current course of illness, treatment, post-discharge goals of care, and treatment preferences. Services At/After Discharge: [facility] with  and [facility] [address] [facility],[address] for drain management.

## 2023-08-31 NOTE — DISCHARGE INSTRUCTIONS
ACDF- Anterior cervical discectomy and fusion    Activity    No lifting, pushing, or pulling  Up as tolerated at least 3-4 times per day. Up and moving helps to prevent blood clots. Wear yunior hose as directed per your physician   No driving for two weeks or as directed per physician     Collar    If collar is present, wear cervical collar at all times until seen at your follow up visit   May remove for dressing or bathing  Keep collar clean    Incision care    Apply clean dry dressing to incision once a day or as needed  You may shower when your incision is not draining/or as directed per your physician     Diet    Increase Fluid/Water intake, eat foods high in fiber, fruits and vegetables to help to prevent constipation  Examples of foods high in fiber    Vegetables      All vegetables, especially asparagus, bean sprouts, broccoli, [place]  sprouts, cabbage, carrots, cauliflower, celery, corn, greens, green beans,  green pepper, onions, peas, potatoes (with skin), snow peas, spinach,  squash, sweet potatoes, tomatoes, zucchini    For maximum fiber intake, eat the peels of fruits and vegetables just be sure  to wash them well first.     Fruits    All fruits, especially apples, berries, grapefruits, mangoes, nectarines,  oranges, peaches, pears, dried fruits (figs, dates, prunes, raisins)    Choose raw fruits and vegetables over juice, cooked, or canned raw fruit  has more fiber. Dried fruit is also a good source of fiber. Some of the common symptoms of a surgical site infection are:    Symptoms in the area where the surgery took place:   Redness   Drainage   Pus   Pain   Swelling   Bad smell     Prevention of surgical site infection:    Make sure that your healthcare providers clean their hands before examining you - either with soap and water or an alcohol-based hand rubs. Family and friends who visit you should not touch the surgical wound or dressings.      Family and friends should clean their hands

## 2023-08-31 NOTE — PLAN OF CARE
Problem: Discharge Planning  Goal: Discharge to home or other facility with appropriate resources  8/30/2023 2352 by Funmilayo Perales RN  Outcome: Progressing  Flowsheets (Taken 8/30/2023 2352)  Discharge to home or other facility with appropriate resources:   Identify barriers to discharge with patient and caregiver   Identify discharge learning needs (meds, wound care, etc)     Problem: Pain  Goal: Verbalizes/displays adequate comfort level or baseline comfort level  8/30/2023 2354 by Funmilayo Perales RN  Outcome: Progressing     Problem: Skin/Tissue Integrity - Adult  Goal: Incisions, wounds, or drain sites healing without S/S of infection  Outcome: Progressing  Flowsheets (Taken 8/30/2023 2354)  Incisions, Wounds, or Drain Sites Healing Without Sign and Symptoms of Infection:   ADMISSION and DAILY: Assess and document risk factors for pressure ulcer development   Implement wound care per orders     Problem: Musculoskeletal - Adult  Goal: Maintain proper alignment of affected body part  Outcome: Progressing  Flowsheets (Taken 8/30/2023 2354)  Maintain proper alignment of affected body part:   Support and protect limb and body alignment per provider's orders   Instruct and reinforce with patient and family use of appropriate assistive device and precautions (e.g. spinal or hip dislocation precautions)     Problem: Gastrointestinal - Adult  Goal: Maintains or returns to baseline bowel function  Outcome: Progressing  Flowsheets (Taken 8/30/2023 2354)  Maintains or returns to baseline bowel function:   Assess bowel function   Administer IV fluids as ordered to ensure adequate hydration   Encourage mobilization and activity   Encourage oral fluids to ensure adequate hydration   Administer ordered medications as needed     Problem: Infection - Adult  Goal: Absence of infection at discharge  Outcome: Progressing  Flowsheets (Taken 8/30/2023 2354)  Absence of infection at discharge:   Assess and monitor for signs and symptoms

## 2023-08-31 NOTE — PROGRESS NOTES
Discussed discharge instructions with patient and spouse. Patient voiced understanding and has no further questions. Patient transported via wheelchair to private car.   Tomás Baig LPN

## 2023-08-31 NOTE — OP NOTE
[place] [address]                                OPERATIVE REPORT    PATIENT NAME: Charlie Carranza                   :        1965  MED REC NO:   [id number]                           ROOM:       0025  ACCOUNT NO:   [de-identified]                           ADMIT DATE: 2023  PROVIDER:     Charito Trinidad. Marisela Mejia M.D.    [place] Burn:  2023    PREOPERATIVE DIAGNOSES:  1. Cervical disk herniation and spondylosis at level of C7-T1 producing      a left upper extremity radiculopathy. 2.  Status post previous ACDF at levels of C5 through C7 performed Dr. Harjeet Arnold in 10/2021 at [facility]. 3.  Left upper extremity radiculopathy. 4.  Obesity with body mass index of 43.43 as the patient is 5 feet 8      inches in height and 225 pounds in weight. POSTOPERATIVE DIAGNOSES:  1. Cervical disk herniation and spondylosis at level of C7-T1 producing      a left upper extremity radiculopathy. 2.  Status post previous ACDF at levels of C5 through C7 performed Dr. Harjeet Arnold in 10/2021 at [facility]. 3.  Left upper extremity radiculopathy. 4.  Obesity with body mass index of 43.43 as the patient is 5 feet 8      inches in height and 225 pounds in weight. PROCEDURES:  1. Removal of anterior cervical plate at levels of C5, C6, C7 including      plating and six bone screws. 2.  The assessment of fusion of levels of C5-C6 and C6-C7.   3.  Anterior cervical interbody diskectomy at level of C7-T1 with a      complete uncus decompression and clearance of canal bilateral.  4.  Anterior cervical interbody fusion at levels of C7-T1.  5.  Use of allograft bone graft, tricortical structure, for this      interbody fusion by [employer], a 7 x 14 x 11 mm size bone graft of bone      tricortical structure for this interbody fusion at level of C7-T1.  6.  Use of allograft bone graft DBM, 2 mL kit of the [employer] see her family physician, Dr. Jasmine Esteban, prior to the  operation for medical clearance and see us in the office as well for the  H and P appointment. DESCRIPTION OF PROCEDURE:  We brought the patient to the operating room  and upon entry, she underwent induction of anesthesia and airway well  secured. Yanes catheter would not be used. She was positioned in a  supine manner upon the operative table with a gentle extension of her  head and neck area for thorough prepping and draping of her cervical  spine and chin to the chest with use of a soap scrub solution, sterile  toweling, and a ChloraPrep solution. We applied sterile sheeting, Orlene Brow  as well, marking skin across the anterior neck above the previous  incision, midline to leftward, in a curvilinear manner along the line of  the left-hand side approach from previous surgery and would inject a  total of 2 mL of a 1% lidocaine with epinephrine solution. IV Ancef was  infused to completion. Skin would then be incised. A formal time-out  had been observed. We exposed the cervical spine, division of skin  platysmal layer and pretracheal fascia, and would identify the omohyoid  muscle. This was divided and was not sectioned previously, but I would  divide this. Gentle retraction upon the esophagus, trachea in the  rightward direction would allow the exposure the plating at levels of C5  through C7 where we were able to gently retract and expose the plating  at the levels of C5 through C7 including the plating and six bone screws  which could be exposed and then unlocked. This was a plate which would  contain six screws, each of them were backed out and deeper to the  plate. We then assessed the fusion over levels of C5-C6 and C6-C7 and   scoring manipulation technique gave a very solid fusion of each of these  two levels. The plating had been removed.   The assessment of fusion  showed everything well healed, and I could then move down to level of  C7-T1,

## 2023-08-31 NOTE — PROGRESS NOTES
[facility] OCCUPATIONAL THERAPY  [facility] ORTHOPEDICS 7K  EVALUATION    Time:   Time In: 0900  Time Out: 09  Timed Code Treatment Minutes: 23 Minutes  Minutes: 33          Date: 2023  Patient Name: David Mckeon,   Gender: female      MRN: [id number]  : 1965  (66 y.o.)  Referring Practitioner: MEME Davis - CNP  Diagnosis: spinal stenosis, cervicothoracic region  Additional Pertinent Hx: Status post previous ACDF at levels of C5 through C7 performed Dr. Lisa Doss in 10/2021 at [facility]. Pt underwent Removal of anterior cervical plate at levels of C5, C6, C7 including  plating and six bone screws. Pt underwent ACDF C7-T1 on  with Dr. Ene Baure    Restrictions/Precautions:  Restrictions/Precautions: Up as Tolerated, Fall Risk  Required Braces or Orthoses  Cervical: c-collar  Position Activity Restriction  Spinal Precautions: No Bending, No Lifting, No Twisting    Subjective  Chart Reviewed: Yes, Orders, Progress Notes, History and Physical    Subjective: Nurse approved OT eval. Pt in bed on OT arrival with  at bedside and supportive. Pt is pleasant and cooperative and eager to participate. pt reports she is hoping to go home today. Pain: painful but tolerates session well. 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  Active

## 2023-08-31 NOTE — PLAN OF CARE
Problem: Discharge Planning  Goal: Discharge to home or other facility with appropriate resources  8/31/2023 1006 by Chani Mccarty LPN  Outcome: Completed     Problem: Pain  Goal: Verbalizes/displays adequate comfort level or baseline comfort level  8/31/2023 1006 by Chani Mccarty LPN  Outcome: Completed     Problem: Skin/Tissue Integrity - Adult  Goal: Incisions, wounds, or drain sites healing without S/S of infection  8/31/2023 1006 by Chani Mccarty LPN  Outcome: Completed     Problem: Metabolic/Fluid and Electrolytes - Adult  Goal: Glucose maintained within prescribed range  8/31/2023 1006 by Chani Mccarty LPN  Outcome: Completed    Problem: Musculoskeletal - Adult  Goal: Maintain proper alignment of affected body part  8/31/2023 1006 by Chani Mccarty LPN  Outcome: Completed     Problem: Gastrointestinal - Adult  Goal: Maintains or returns to baseline bowel function  8/31/2023 1006 by Chani Mccarty LPN  Outcome: Completed     Problem: Infection - Adult  Goal: Absence of infection at discharge  8/31/2023 1006 by Chani Mccarty LPN  Outcome: Completed     Problem: ABCDS Injury Assessment  Goal: Absence of physical injury  Outcome: Completed     Problem: Safety - Adult  Goal: Free from fall injury  Outcome: Completed    Care plan reviewed with patient. Patient verbalized understanding of the plan of care and contribute to goal setting.

## 2023-09-01 NOTE — DISCHARGE SUMMARY
[place] [address]                               DISCHARGE SUMMARY    PATIENT NAME: Cory Lamas                   :        1965  MED REC NO:   [id number]                           ROOM:       0025  ACCOUNT NO:   [de-identified]                           ADMIT DATE: 2023  PROVIDER:     Zeny Rodríguez PA-C                    [facility] DATE: 2023    DISCHARGE DIAGNOSIS:  Cervical spinal stenosis with cervical  radiculopathy. OPERATION PERFORMED:  Removal of plate of C5 to C7 with an ACDF of C7-T1  completed on the date of 2023 by Dr. Carmen Nunn at [facility]. HOSPITAL COURSE:  The patient is a 81-year-old female who presented to  our office with complaints of significant neck and left arm pain due to  disk herniation of the left hand side level of C7-T1. She had undergone  previous ACDF C5 through C7 completed by a different surgeon roughly two  years ago. She had developed adjacent level disk herniation and elected  to undergo operative management. She underwent surgery at [facility] on the date of 2023. Postop, she was admitted to  floor 7K for monitoring. On postop day-1, she was doing well. She was  tolerating swallowing okay. She had improvement of her preoperative  radicular pain and was generally doing well. Her pain was controlled. She was fully neurologically intact, and she was ready to discharge  home. Her EDDIE drain was left in place. An order for home health was  placed for management of drain and dressing at home. DISCHARGE MEDICATIONS:  Include Percocet and Flexeril. DISCHARGE INSTRUCTIONS:  Include no heavy lifting, bending or twisting. She will continue to use a collar for two weeks. No driving for two  weeks.   We will plan to see her back in the office in roughly two weeks'  time for evaluation of cervical spine x-rays and to see how

## 2024-05-27 ENCOUNTER — HOSPITAL ENCOUNTER (OUTPATIENT)
Age: 59
Discharge: HOME | End: 2024-05-27
Payer: COMMERCIAL

## 2024-05-27 LAB
ALBUMIN SERPL BCG-MCNC: 3.8 G/DL (ref 3.5–5.1)
ANION GAP SERPL CALC-SCNC: 9 MEQ/L (ref 8–16)
BASOPHILS ABSOLUTE: 0 THOU/MM3 (ref 0–0.1)
BASOPHILS NFR BLD AUTO: 0.9 %
BUN SERPL-MCNC: 17 MG/DL (ref 7–22)
CALCIUM SERPL-MCNC: 9.2 MG/DL (ref 8.5–10.5)
CHLORIDE SERPL-SCNC: 103 MEQ/L (ref 98–111)
CO2 SERPL-SCNC: 29 MEQ/L (ref 23–33)
CREAT SERPL-MCNC: 0.5 MG/DL (ref 0.4–1.2)
DEPRECATED RDW RBC AUTO: 46.5 FL (ref 35–45)
EOSINOPHIL NFR BLD AUTO: 3.2 %
EOSINOPHILS ABSOLUTE: 0.2 THOU/MM3 (ref 0–0.4)
ERYTHROCYTE [DISTWIDTH] IN BLOOD BY AUTOMATED COUNT: 13.7 % (ref 11.5–14.5)
GFR SERPL CREATININE-BSD FRML MDRD: > 90 ML/MIN/1.73M2
GLUCOSE SERPL-MCNC: 99 MG/DL (ref 70–108)
HCT VFR BLD AUTO: 42.4 % (ref 37–47)
HGB BLD-MCNC: 13.9 GM/DL (ref 12–16)
IMM GRANULOCYTES # BLD AUTO: 0.01 THOU/MM3 (ref 0–0.07)
IMM GRANULOCYTES NFR BLD AUTO: 0.2 %
LYMPHOCYTES ABSOLUTE: 1.8 THOU/MM3 (ref 1–4.8)
LYMPHOCYTES NFR BLD AUTO: 32.7 %
MCH RBC QN AUTO: 30.2 PG (ref 26–33)
MCHC RBC AUTO-ENTMCNC: 32.8 GM/DL (ref 32.2–35.5)
MCV RBC AUTO: 92.2 FL (ref 81–99)
MONOCYTES ABSOLUTE: 0.4 THOU/MM3 (ref 0.4–1.3)
MONOCYTES NFR BLD AUTO: 8.2 %
MRSA DNA SPEC QL NAA+PROBE: NEGATIVE
NEUTROPHILS ABSOLUTE: 3 THOU/MM3 (ref 1.8–7.7)
NEUTROPHILS NFR BLD AUTO: 54.8 %
NRBC BLD AUTO-RTO: 0 /100 WBC
PLATELET # BLD AUTO: 274 THOU/MM3 (ref 130–400)
PMV BLD AUTO: 10.8 FL (ref 9.4–12.4)
POTASSIUM SERPL-SCNC: 4.3 MEQ/L (ref 3.5–5.2)
PREALB SERPL-MCNC: 19.2 MG/DL (ref 20–40)
RBC # BLD AUTO: 4.6 MILL/MM3 (ref 4.2–5.4)
SODIUM SERPL-SCNC: 141 MEQ/L (ref 135–145)
WBC # BLD AUTO: 5.4 THOU/MM3 (ref 4.8–10.8)

## 2024-05-27 PROCEDURE — 87641 MR-STAPH DNA AMP PROBE: CPT

## 2024-05-27 PROCEDURE — 80048 BASIC METABOLIC PNL TOTAL CA: CPT

## 2024-05-27 PROCEDURE — 84134 ASSAY OF PREALBUMIN: CPT

## 2024-05-27 PROCEDURE — 85025 COMPLETE CBC W/AUTO DIFF WBC: CPT

## 2024-05-27 PROCEDURE — 82040 ASSAY OF SERUM ALBUMIN: CPT

## 2024-05-27 PROCEDURE — 36415 COLL VENOUS BLD VENIPUNCTURE: CPT

## 2024-06-24 RX ORDER — MOMETASONE FUROATE MONOHYDRATE 50 UG/1
2 SPRAY, METERED NASAL DAILY
COMMUNITY

## 2024-06-24 RX ORDER — ADALIMUMAB-ATTO 40 MG/.4ML
40 INJECTION SUBCUTANEOUS WEEKLY
COMMUNITY

## 2024-06-24 NOTE — PROGRESS NOTES
Follow all instructions given by your physician  Do not eat or drink anything after midnight prior to surgery(includes water, chewing gum, mints and ice chips)  Sips of water am of surgery with allowed medications  May brush teeth   Do not smoke or chew tobacco, drink alcoholic beverages or use any illicit drugs for 24 hours prior to surgery  Bring insurance info and photo ID  Bring pertinent paperwork with you from Doctor or surgeons's office  Wear clean comfortable, loose-fitting clothing  No make-up, nail polish, jewelry, piercings, or contact lenses to be worn day of surgery  No glue on dentures morning of surgery; you will be asked to remove them for surgery. Case for glasses.  Shower the night before and the morning of surgery with cleansing soap provided or a liquid antibacterial soap, dry with new fresh clean towel after each shower, no lotions, creams or powder.  Clean sheets and pillowcase on bed night before surgery  Bring medications in original bottles, Bring rescue inhalers with you    Do you have a DNR? NO  Please Bring Healthcare Directive or Healthcare Power of  in so we can scan it into your chart.    Our pharmacy has a Meds to Beds program where they will deliver any new prescriptions you may have to your room before you leave. Our Pharmacy will clear it through your insurance; for example (same co pay). This enables you to take your new RX as soon as you need when you get home and avoids stop/wait delays on the way home.  Please have a form of payment with you and have someone designated as your Pharmacy contact with their phone # as you may not feel well or still be under the influence of anesthesia.    Please refer to the SSI-Surgical Site Infection Flyer you hopefully received in the mail-together we can prevent infections; signs and symptoms reviewed.  When discharged be sure you understand how to care for your wound and that you have the Dr./office phone # to call if you have any

## 2024-06-24 NOTE — PROGRESS NOTES
PAT call attempted, patient unavailable, left message to please call us back at your earliest convenience; [phone]

## 2024-06-24 NOTE — PROGRESS NOTES
Preliminary Discharge Planning Questionnaire  Date of Surgery 6/26/24   Surgeon Felipe      Having the proper help and care after surgery is very important to your recovery. Who will be able to help you at home when you are discharged from the hospital?    spouse    Name(s) Cayetano Garcia    How many steps to enter your home?  2    Bathroom on first floor?  Yes    Bedroom on the first floor?  Yes    Do you have an elevated toilet seat to use at home?  Yes    Do you have a walker to use at home?    Total Joints - with wheels N/A   Spine - with wheels  Yes     Have you been doing home exercises?yes    *You will go home with some outpatient physical therapy, where do you prefer to go? PT [facility] in [facility]    This typically will not start until after your post visit to your Dr. (3-4 weeks after surgery)    * What home health agency would you like to use?[facility]      List of all Home Health Agencies Available given to patient, with website ( per Social Work Team)      Please have 2 preferences when you come for surgery- 1st and 2nd choice

## 2024-06-25 NOTE — PROGRESS NOTES
Reply from Dr Lopez from anesthesia the would like a cardiac clearance on patient  Called an inform Dr Wang office. Spoke to dante she said fax it too her and she will look into it

## 2024-06-26 ENCOUNTER — APPOINTMENT (OUTPATIENT)
Dept: GENERAL RADIOLOGY | Age: 59
End: 2024-06-26
Attending: ORTHOPAEDIC SURGERY
Payer: COMMERCIAL

## 2024-06-26 ENCOUNTER — HOSPITAL ENCOUNTER (INPATIENT)
Age: 59
LOS: 3 days | Discharge: HOME | End: 2024-06-29
Attending: ORTHOPAEDIC SURGERY | Admitting: ORTHOPAEDIC SURGERY
Payer: COMMERCIAL

## 2024-06-26 ENCOUNTER — ANESTHESIA (OUTPATIENT)
Dept: OPERATING ROOM | Age: 59
End: 2024-06-26
Payer: COMMERCIAL

## 2024-06-26 ENCOUNTER — ANESTHESIA EVENT (OUTPATIENT)
Dept: OPERATING ROOM | Age: 59
End: 2024-06-26
Payer: COMMERCIAL

## 2024-06-26 DIAGNOSIS — M48.062 SPINAL STENOSIS OF LUMBAR REGION WITH NEUROGENIC CLAUDICATION: Primary | ICD-10-CM

## 2024-06-26 LAB
ABO: NORMAL
ANTIBODY SCREEN: NORMAL
RH FACTOR: NORMAL

## 2024-06-26 PROCEDURE — 7100000000 HC PACU RECOVERY - FIRST 15 MIN: Performed by: ORTHOPAEDIC SURGERY

## 2024-06-26 PROCEDURE — 36415 COLL VENOUS BLD VENIPUNCTURE: CPT

## 2024-06-26 PROCEDURE — 3700000000 HC ANESTHESIA ATTENDED CARE: Performed by: ORTHOPAEDIC SURGERY

## 2024-06-26 PROCEDURE — 3600000004 HC SURGERY LEVEL 4 BASE: Performed by: ORTHOPAEDIC SURGERY

## 2024-06-26 PROCEDURE — 2500000003 HC RX 250 WO HCPCS: Performed by: ORTHOPAEDIC SURGERY

## 2024-06-26 PROCEDURE — 72100 X-RAY EXAM L-S SPINE 2/3 VWS: CPT

## 2024-06-26 PROCEDURE — 2500000003 HC RX 250 WO HCPCS: Performed by: NURSE ANESTHETIST, CERTIFIED REGISTERED

## 2024-06-26 PROCEDURE — 2580000003 HC RX 258: Performed by: PHYSICIAN ASSISTANT

## 2024-06-26 PROCEDURE — 6360000002 HC RX W HCPCS: Performed by: NURSE ANESTHETIST, CERTIFIED REGISTERED

## 2024-06-26 PROCEDURE — 6360000002 HC RX W HCPCS: Performed by: PHYSICIAN ASSISTANT

## 2024-06-26 PROCEDURE — C1713 ANCHOR/SCREW BN/BN,TIS/BN: HCPCS | Performed by: ORTHOPAEDIC SURGERY

## 2024-06-26 PROCEDURE — 86850 RBC ANTIBODY SCREEN: CPT

## 2024-06-26 PROCEDURE — 2500000003 HC RX 250 WO HCPCS: Performed by: PHYSICIAN ASSISTANT

## 2024-06-26 PROCEDURE — 2720000010 HC SURG SUPPLY STERILE: Performed by: ORTHOPAEDIC SURGERY

## 2024-06-26 PROCEDURE — 6370000000 HC RX 637 (ALT 250 FOR IP): Performed by: PHYSICIAN ASSISTANT

## 2024-06-26 PROCEDURE — 3600000014 HC SURGERY LEVEL 4 ADDTL 15MIN: Performed by: ORTHOPAEDIC SURGERY

## 2024-06-26 PROCEDURE — 72020 X-RAY EXAM OF SPINE 1 VIEW: CPT

## 2024-06-26 PROCEDURE — P9045 ALBUMIN (HUMAN), 5%, 250 ML: HCPCS | Performed by: NURSE ANESTHETIST, CERTIFIED REGISTERED

## 2024-06-26 PROCEDURE — 2580000003 HC RX 258: Performed by: NURSE ANESTHETIST, CERTIFIED REGISTERED

## 2024-06-26 PROCEDURE — 7100000001 HC PACU RECOVERY - ADDTL 15 MIN: Performed by: ORTHOPAEDIC SURGERY

## 2024-06-26 PROCEDURE — 1200000000 HC SEMI PRIVATE

## 2024-06-26 PROCEDURE — 2709999900 HC NON-CHARGEABLE SUPPLY: Performed by: ORTHOPAEDIC SURGERY

## 2024-06-26 PROCEDURE — 86900 BLOOD TYPING SEROLOGIC ABO: CPT

## 2024-06-26 PROCEDURE — 3700000001 HC ADD 15 MINUTES (ANESTHESIA): Performed by: ORTHOPAEDIC SURGERY

## 2024-06-26 PROCEDURE — 86901 BLOOD TYPING SEROLOGIC RH(D): CPT

## 2024-06-26 DEVICE — ALLOGRAFT BNE BRIDGE 100X25 MM 46 CC BIOADAPT: Type: IMPLANTABLE DEVICE | Site: SPINE LUMBAR | Status: FUNCTIONAL

## 2024-06-26 DEVICE — SCREW, POLY, SOLID, 6.5X50
Type: IMPLANTABLE DEVICE | Site: SPINE LUMBAR | Status: FUNCTIONAL
Brand: CORTERA

## 2024-06-26 DEVICE — ROD, TI, PREBENT, 5.5X95
Type: IMPLANTABLE DEVICE | Site: SPINE LUMBAR | Status: FUNCTIONAL
Brand: CORTERA

## 2024-06-26 DEVICE — GRAFT BNE LG: Type: IMPLANTABLE DEVICE | Site: SPINE LUMBAR | Status: FUNCTIONAL

## 2024-06-26 DEVICE — GRAFT BNE 5 CC DBM FIBREX: Type: IMPLANTABLE DEVICE | Site: SPINE LUMBAR | Status: FUNCTIONAL

## 2024-06-26 DEVICE — SET SCREW, 5.5-6.0
Type: IMPLANTABLE DEVICE | Site: SPINE LUMBAR | Status: FUNCTIONAL
Brand: CORTERA

## 2024-06-26 RX ORDER — OXYCODONE HYDROCHLORIDE 5 MG/1
10 TABLET ORAL EVERY 4 HOURS PRN
Status: DISCONTINUED | OUTPATIENT
Start: 2024-06-26 | End: 2024-06-29 | Stop reason: HOSPADM

## 2024-06-26 RX ORDER — SUCCINYLCHOLINE/SOD CL,ISO/PF 200MG/10ML
SYRINGE (ML) INTRAVENOUS PRN
Status: DISCONTINUED | OUTPATIENT
Start: 2024-06-26 | End: 2024-06-26 | Stop reason: SDUPTHER

## 2024-06-26 RX ORDER — FENTANYL CITRATE 50 UG/ML
INJECTION, SOLUTION INTRAMUSCULAR; INTRAVENOUS PRN
Status: DISCONTINUED | OUTPATIENT
Start: 2024-06-26 | End: 2024-06-26 | Stop reason: SDUPTHER

## 2024-06-26 RX ORDER — SENNA AND DOCUSATE SODIUM 50; 8.6 MG/1; MG/1
1 TABLET, FILM COATED ORAL 2 TIMES DAILY
Status: DISCONTINUED | OUTPATIENT
Start: 2024-06-26 | End: 2024-06-29 | Stop reason: HOSPADM

## 2024-06-26 RX ORDER — PROMETHAZINE HYDROCHLORIDE 25 MG/1
12.5 TABLET ORAL ONCE
Status: COMPLETED | OUTPATIENT
Start: 2024-06-26 | End: 2024-06-26

## 2024-06-26 RX ORDER — SODIUM CHLORIDE 0.9 % (FLUSH) 0.9 %
5-40 SYRINGE (ML) INJECTION PRN
Status: DISCONTINUED | OUTPATIENT
Start: 2024-06-26 | End: 2024-06-29 | Stop reason: HOSPADM

## 2024-06-26 RX ORDER — PROPOFOL 10 MG/ML
INJECTION, EMULSION INTRAVENOUS PRN
Status: DISCONTINUED | OUTPATIENT
Start: 2024-06-26 | End: 2024-06-26 | Stop reason: SDUPTHER

## 2024-06-26 RX ORDER — ALBUMIN, HUMAN INJ 5% 5 %
SOLUTION INTRAVENOUS PRN
Status: DISCONTINUED | OUTPATIENT
Start: 2024-06-26 | End: 2024-06-26 | Stop reason: SDUPTHER

## 2024-06-26 RX ORDER — ONDANSETRON 2 MG/ML
4 INJECTION INTRAMUSCULAR; INTRAVENOUS EVERY 6 HOURS PRN
Status: DISCONTINUED | OUTPATIENT
Start: 2024-06-26 | End: 2024-06-29 | Stop reason: HOSPADM

## 2024-06-26 RX ORDER — FENTANYL CITRATE 50 UG/ML
50 INJECTION, SOLUTION INTRAMUSCULAR; INTRAVENOUS EVERY 5 MIN PRN
Status: DISCONTINUED | OUTPATIENT
Start: 2024-06-26 | End: 2024-06-26 | Stop reason: HOSPADM

## 2024-06-26 RX ORDER — LIDOCAINE HCL/PF 100 MG/5ML
SYRINGE (ML) INJECTION PRN
Status: DISCONTINUED | OUTPATIENT
Start: 2024-06-26 | End: 2024-06-26 | Stop reason: SDUPTHER

## 2024-06-26 RX ORDER — ONDANSETRON 2 MG/ML
INJECTION INTRAMUSCULAR; INTRAVENOUS PRN
Status: DISCONTINUED | OUTPATIENT
Start: 2024-06-26 | End: 2024-06-26 | Stop reason: SDUPTHER

## 2024-06-26 RX ORDER — MORPHINE SULFATE 2 MG/ML
2 INJECTION, SOLUTION INTRAMUSCULAR; INTRAVENOUS EVERY 5 MIN PRN
Status: DISCONTINUED | OUTPATIENT
Start: 2024-06-26 | End: 2024-06-26 | Stop reason: HOSPADM

## 2024-06-26 RX ORDER — DOCUSATE SODIUM 100 MG/1
200 CAPSULE, LIQUID FILLED ORAL DAILY
Status: DISCONTINUED | OUTPATIENT
Start: 2024-06-26 | End: 2024-06-29 | Stop reason: HOSPADM

## 2024-06-26 RX ORDER — BISACODYL 10 MG
10 SUPPOSITORY, RECTAL RECTAL DAILY PRN
Status: DISCONTINUED | OUTPATIENT
Start: 2024-06-26 | End: 2024-06-29 | Stop reason: HOSPADM

## 2024-06-26 RX ORDER — LEVOTHYROXINE SODIUM 0.03 MG/1
25 TABLET ORAL DAILY
Status: DISCONTINUED | OUTPATIENT
Start: 2024-06-27 | End: 2024-06-29 | Stop reason: HOSPADM

## 2024-06-26 RX ORDER — SODIUM CHLORIDE 0.9 % (FLUSH) 0.9 %
5-40 SYRINGE (ML) INJECTION EVERY 12 HOURS SCHEDULED
Status: DISCONTINUED | OUTPATIENT
Start: 2024-06-26 | End: 2024-06-29 | Stop reason: HOSPADM

## 2024-06-26 RX ORDER — ONDANSETRON 4 MG/1
4 TABLET, ORALLY DISINTEGRATING ORAL EVERY 8 HOURS PRN
Status: DISCONTINUED | OUTPATIENT
Start: 2024-06-26 | End: 2024-06-29 | Stop reason: HOSPADM

## 2024-06-26 RX ORDER — SODIUM CHLORIDE 0.9 % (FLUSH) 0.9 %
5-40 SYRINGE (ML) INJECTION EVERY 12 HOURS SCHEDULED
Status: DISCONTINUED | OUTPATIENT
Start: 2024-06-26 | End: 2024-06-26 | Stop reason: HOSPADM

## 2024-06-26 RX ORDER — LABETALOL HYDROCHLORIDE 5 MG/ML
10 INJECTION INTRAVENOUS
Status: DISCONTINUED | OUTPATIENT
Start: 2024-06-26 | End: 2024-06-26 | Stop reason: HOSPADM

## 2024-06-26 RX ORDER — POLYETHYLENE GLYCOL 3350 17 G/17G
17 POWDER, FOR SOLUTION ORAL DAILY
Status: DISCONTINUED | OUTPATIENT
Start: 2024-06-26 | End: 2024-06-29 | Stop reason: HOSPADM

## 2024-06-26 RX ORDER — SODIUM CHLORIDE 9 MG/ML
INJECTION, SOLUTION INTRAVENOUS PRN
Status: DISCONTINUED | OUTPATIENT
Start: 2024-06-26 | End: 2024-06-26 | Stop reason: HOSPADM

## 2024-06-26 RX ORDER — SODIUM CHLORIDE 9 MG/ML
INJECTION, SOLUTION INTRAVENOUS CONTINUOUS
Status: DISCONTINUED | OUTPATIENT
Start: 2024-06-26 | End: 2024-06-29 | Stop reason: HOSPADM

## 2024-06-26 RX ORDER — SODIUM CHLORIDE 0.9 % (FLUSH) 0.9 %
5-40 SYRINGE (ML) INJECTION PRN
Status: DISCONTINUED | OUTPATIENT
Start: 2024-06-26 | End: 2024-06-26 | Stop reason: HOSPADM

## 2024-06-26 RX ORDER — CYCLOBENZAPRINE HCL 10 MG
10 TABLET ORAL 3 TIMES DAILY PRN
Status: DISCONTINUED | OUTPATIENT
Start: 2024-06-26 | End: 2024-06-29 | Stop reason: HOSPADM

## 2024-06-26 RX ORDER — SODIUM CHLORIDE 9 MG/ML
INJECTION, SOLUTION INTRAVENOUS CONTINUOUS PRN
Status: DISCONTINUED | OUTPATIENT
Start: 2024-06-26 | End: 2024-06-26 | Stop reason: SDUPTHER

## 2024-06-26 RX ORDER — DEXAMETHASONE SODIUM PHOSPHATE 10 MG/ML
INJECTION, EMULSION INTRAMUSCULAR; INTRAVENOUS PRN
Status: DISCONTINUED | OUTPATIENT
Start: 2024-06-26 | End: 2024-06-26 | Stop reason: SDUPTHER

## 2024-06-26 RX ORDER — ROCURONIUM BROMIDE 10 MG/ML
INJECTION, SOLUTION INTRAVENOUS PRN
Status: DISCONTINUED | OUTPATIENT
Start: 2024-06-26 | End: 2024-06-26 | Stop reason: SDUPTHER

## 2024-06-26 RX ORDER — LIDOCAINE HYDROCHLORIDE AND EPINEPHRINE 10; 10 MG/ML; UG/ML
INJECTION, SOLUTION INFILTRATION; PERINEURAL PRN
Status: DISCONTINUED | OUTPATIENT
Start: 2024-06-26 | End: 2024-06-26 | Stop reason: ALTCHOICE

## 2024-06-26 RX ORDER — NALOXONE HYDROCHLORIDE 0.4 MG/ML
INJECTION, SOLUTION INTRAMUSCULAR; INTRAVENOUS; SUBCUTANEOUS PRN
Status: DISCONTINUED | OUTPATIENT
Start: 2024-06-26 | End: 2024-06-26 | Stop reason: HOSPADM

## 2024-06-26 RX ORDER — ACETAMINOPHEN 325 MG/1
650 TABLET ORAL EVERY 4 HOURS
Status: DISCONTINUED | OUTPATIENT
Start: 2024-06-26 | End: 2024-06-29 | Stop reason: HOSPADM

## 2024-06-26 RX ORDER — HYDROMORPHONE HYDROCHLORIDE 2 MG/ML
INJECTION, SOLUTION INTRAMUSCULAR; INTRAVENOUS; SUBCUTANEOUS PRN
Status: DISCONTINUED | OUTPATIENT
Start: 2024-06-26 | End: 2024-06-26 | Stop reason: SDUPTHER

## 2024-06-26 RX ORDER — DULOXETIN HYDROCHLORIDE 30 MG/1
30 CAPSULE, DELAYED RELEASE ORAL DAILY
Status: DISCONTINUED | OUTPATIENT
Start: 2024-06-26 | End: 2024-06-29 | Stop reason: HOSPADM

## 2024-06-26 RX ORDER — SODIUM CHLORIDE 9 MG/ML
INJECTION, SOLUTION INTRAVENOUS CONTINUOUS
Status: DISCONTINUED | OUTPATIENT
Start: 2024-06-26 | End: 2024-06-26 | Stop reason: HOSPADM

## 2024-06-26 RX ORDER — PANTOPRAZOLE SODIUM 40 MG/1
40 TABLET, DELAYED RELEASE ORAL
Status: DISCONTINUED | OUTPATIENT
Start: 2024-06-27 | End: 2024-06-29 | Stop reason: HOSPADM

## 2024-06-26 RX ORDER — SODIUM CHLORIDE 9 MG/ML
INJECTION, SOLUTION INTRAVENOUS PRN
Status: DISCONTINUED | OUTPATIENT
Start: 2024-06-26 | End: 2024-06-29 | Stop reason: HOSPADM

## 2024-06-26 RX ORDER — PROPRANOLOL HCL 60 MG
120 CAPSULE, EXTENDED RELEASE 24HR ORAL DAILY
Status: DISCONTINUED | OUTPATIENT
Start: 2024-06-27 | End: 2024-06-29 | Stop reason: HOSPADM

## 2024-06-26 RX ORDER — AMLODIPINE BESYLATE 10 MG/1
10 TABLET ORAL DAILY
Status: DISCONTINUED | OUTPATIENT
Start: 2024-06-27 | End: 2024-06-29 | Stop reason: HOSPADM

## 2024-06-26 RX ORDER — MIDAZOLAM HYDROCHLORIDE 1 MG/ML
INJECTION INTRAMUSCULAR; INTRAVENOUS PRN
Status: DISCONTINUED | OUTPATIENT
Start: 2024-06-26 | End: 2024-06-26 | Stop reason: SDUPTHER

## 2024-06-26 RX ORDER — OXYCODONE HYDROCHLORIDE 5 MG/1
5 TABLET ORAL EVERY 4 HOURS PRN
Status: DISCONTINUED | OUTPATIENT
Start: 2024-06-26 | End: 2024-06-29 | Stop reason: HOSPADM

## 2024-06-26 RX ORDER — PHENYLEPHRINE HCL IN 0.9% NACL 1 MG/10 ML
SYRINGE (ML) INTRAVENOUS PRN
Status: DISCONTINUED | OUTPATIENT
Start: 2024-06-26 | End: 2024-06-26 | Stop reason: SDUPTHER

## 2024-06-26 RX ADMIN — Medication 100 MG: at 09:49

## 2024-06-26 RX ADMIN — SODIUM CHLORIDE: 9 INJECTION, SOLUTION INTRAVENOUS at 11:00

## 2024-06-26 RX ADMIN — CEFAZOLIN 3000 MG: 10 INJECTION, POWDER, FOR SOLUTION INTRAVENOUS at 16:53

## 2024-06-26 RX ADMIN — Medication 100 MCG: at 12:51

## 2024-06-26 RX ADMIN — Medication 100 MCG: at 13:00

## 2024-06-26 RX ADMIN — PROMETHAZINE HYDROCHLORIDE 12.5 MG: 25 TABLET ORAL at 15:48

## 2024-06-26 RX ADMIN — Medication 200 MCG: at 10:57

## 2024-06-26 RX ADMIN — DOCUSATE SODIUM 200 MG: 100 CAPSULE, LIQUID FILLED ORAL at 15:48

## 2024-06-26 RX ADMIN — ROCURONIUM BROMIDE 40 MG: 10 INJECTION INTRAVENOUS at 10:56

## 2024-06-26 RX ADMIN — DULOXETINE HYDROCHLORIDE 30 MG: 30 CAPSULE, DELAYED RELEASE ORAL at 15:49

## 2024-06-26 RX ADMIN — SENNOSIDES AND DOCUSATE SODIUM 1 TABLET: 50; 8.6 TABLET ORAL at 20:25

## 2024-06-26 RX ADMIN — FENTANYL CITRATE 100 MCG: 50 INJECTION, SOLUTION INTRAMUSCULAR; INTRAVENOUS at 09:57

## 2024-06-26 RX ADMIN — ACETAMINOPHEN 650 MG: 325 TABLET ORAL at 15:38

## 2024-06-26 RX ADMIN — Medication 100 MCG: at 11:52

## 2024-06-26 RX ADMIN — DEXAMETHASONE SODIUM PHOSPHATE 8 MG: 10 INJECTION, EMULSION INTRAMUSCULAR; INTRAVENOUS at 11:20

## 2024-06-26 RX ADMIN — ROCURONIUM BROMIDE 50 MG: 10 INJECTION INTRAVENOUS at 09:58

## 2024-06-26 RX ADMIN — MIDAZOLAM 2 MG: 1 INJECTION INTRAMUSCULAR; INTRAVENOUS at 09:45

## 2024-06-26 RX ADMIN — ACETAMINOPHEN 650 MG: 325 TABLET ORAL at 20:25

## 2024-06-26 RX ADMIN — Medication 200 MCG: at 10:11

## 2024-06-26 RX ADMIN — SODIUM CHLORIDE: 9 INJECTION, SOLUTION INTRAVENOUS at 16:51

## 2024-06-26 RX ADMIN — ONDANSETRON 4 MG: 2 INJECTION INTRAMUSCULAR; INTRAVENOUS at 11:20

## 2024-06-26 RX ADMIN — SODIUM CHLORIDE: 9 INJECTION, SOLUTION INTRAVENOUS at 12:29

## 2024-06-26 RX ADMIN — SODIUM CHLORIDE: 9 INJECTION, SOLUTION INTRAVENOUS at 11:54

## 2024-06-26 RX ADMIN — Medication 3000 MG: at 09:57

## 2024-06-26 RX ADMIN — Medication 200 MCG: at 10:36

## 2024-06-26 RX ADMIN — SUGAMMADEX 400 MG: 100 INJECTION, SOLUTION INTRAVENOUS at 13:53

## 2024-06-26 RX ADMIN — Medication 200 MCG: at 11:09

## 2024-06-26 RX ADMIN — SODIUM CHLORIDE: 9 INJECTION, SOLUTION INTRAVENOUS at 07:20

## 2024-06-26 RX ADMIN — Medication 200 MCG: at 10:45

## 2024-06-26 RX ADMIN — TRANEXAMIC ACID 1000 MG: 100 INJECTION, SOLUTION INTRAVENOUS at 11:09

## 2024-06-26 RX ADMIN — SODIUM CHLORIDE: 9 INJECTION, SOLUTION INTRAVENOUS at 12:55

## 2024-06-26 RX ADMIN — ALBUMIN (HUMAN) 12.5 G: 12.5 INJECTION, SOLUTION INTRAVENOUS at 12:00

## 2024-06-26 RX ADMIN — PROPOFOL 150 MG: 10 INJECTION, EMULSION INTRAVENOUS at 09:49

## 2024-06-26 RX ADMIN — Medication 160 MG: at 09:49

## 2024-06-26 RX ADMIN — OXYCODONE HYDROCHLORIDE 10 MG: 5 TABLET ORAL at 15:38

## 2024-06-26 RX ADMIN — HYDROMORPHONE HYDROCHLORIDE 0.5 MG: 2 INJECTION INTRAMUSCULAR; INTRAVENOUS; SUBCUTANEOUS at 13:33

## 2024-06-26 RX ADMIN — ROCURONIUM BROMIDE 10 MG: 10 INJECTION INTRAVENOUS at 11:52

## 2024-06-26 RX ADMIN — Medication 200 MCG: at 10:21

## 2024-06-26 RX ADMIN — Medication 200 MCG: at 12:32

## 2024-06-26 ASSESSMENT — PAIN DESCRIPTION - LOCATION
LOCATION: BACK

## 2024-06-26 ASSESSMENT — PAIN DESCRIPTION - DESCRIPTORS
DESCRIPTORS: ACHING
DESCRIPTORS: ACHING;DISCOMFORT

## 2024-06-26 ASSESSMENT — PAIN SCALES - GENERAL
PAINLEVEL_OUTOF10: 6
PAINLEVEL_OUTOF10: 4
PAINLEVEL_OUTOF10: 8
PAINLEVEL_OUTOF10: 3
PAINLEVEL_OUTOF10: 9

## 2024-06-26 ASSESSMENT — PAIN DESCRIPTION - ORIENTATION
ORIENTATION: MID;LOWER
ORIENTATION: LOWER
ORIENTATION: LOWER

## 2024-06-26 ASSESSMENT — PAIN DESCRIPTION - FREQUENCY: FREQUENCY: CONTINUOUS

## 2024-06-26 ASSESSMENT — PAIN - FUNCTIONAL ASSESSMENT
PAIN_FUNCTIONAL_ASSESSMENT: 0-10
PAIN_FUNCTIONAL_ASSESSMENT: PREVENTS OR INTERFERES SOME ACTIVE ACTIVITIES AND ADLS

## 2024-06-26 ASSESSMENT — PAIN DESCRIPTION - PAIN TYPE: TYPE: SURGICAL PAIN

## 2024-06-26 ASSESSMENT — PAIN DESCRIPTION - ONSET: ONSET: ON-GOING

## 2024-06-26 NOTE — H&P
I have reviewed the history and physical and examined the patient.  I find no relevant changes.  I have reviewed with the patient and/or family members, during the preoperative office visit the risks, benefits, and alternatives to the procedure.    Alec Wang MD

## 2024-06-26 NOTE — PROGRESS NOTES
Pt admitted to [facility] room 16 and oriented to unit. SCD sleeves applied. Nares swabbed. Pt verbalized permission for first name, last initial and physicians name on white board. SDS board and discharge criteria explained, pt and family verbalized understanding. Pt denies thoughts of harming self or others. Call light in reach. Family at the bedside.  Cayetano [phone]

## 2024-06-26 NOTE — PROGRESS NOTES
1403- pt to pacu. Pt denies pain, nausea, N/T. VSS, pt appears in no acute distress.    1411- pt continues to deny complaint.    1424- pt tolerates C&DB well. Report called to Silvia. Pt continues to deny complaint.    1433- pt meets criteria for discharge from pacu.    1434- Transport arrives, pt taken up to 7K 18 in stable condition. SDS called to update family, send to 7K

## 2024-06-26 NOTE — PLAN OF CARE
Problem: Discharge Planning  Goal: Discharge to home or other facility with appropriate resources  Outcome: Progressing  Flowsheets (Taken 6/26/2024 1709)  Discharge to home or other facility with appropriate resources: Identify barriers to discharge with patient and caregiver     Problem: Pain  Goal: Verbalizes/displays adequate comfort level or baseline comfort level  Outcome: Progressing  Flowsheets (Taken 6/26/2024 1709)  Verbalizes/displays adequate comfort level or baseline comfort level: Encourage patient to monitor pain and request assistance     Problem: ABCDS Injury Assessment  Goal: Absence of physical injury  Outcome: Progressing  Flowsheets (Taken 6/26/2024 1709)  Absence of Physical Injury: Implement safety measures based on patient assessment   Care plan reviewed with patient.  Patient verbalize understanding of the plan of care and contribute to goal setting.

## 2024-06-26 NOTE — ANESTHESIA POSTPROCEDURE EVALUATION
Department of Anesthesiology  Postprocedure Note    Patient: Dasha Garcia  MRN: [id number]  YOB: 1965  Date of evaluation: 6/26/2024    Procedure Summary       Date: 06/26/24 Room / Location: [facility] OR  / [facility] OR    Anesthesia Start: 0945 Anesthesia Stop: 1406    Procedure: L2-L5 Zach/ PSF (Spine Lumbar) Diagnosis:       Spinal stenosis of lumbar region with neurogenic claudication      (Spinal stenosis of lumbar region with neurogenic claudication [M48.062])    Surgeons: Alec Wang MD Responsible Provider: Keon Lopez MD    Anesthesia Type: general ASA Status: 3            Anesthesia Type: No value filed.    Katlyn Phase I: Katlyn Score: 10    Katlyn Phase II:      Anesthesia Post Evaluation    Patient location during evaluation: PACU  Patient participation: complete - patient participated  Level of consciousness: awake  Airway patency: patent  Nausea & Vomiting: no vomiting and no nausea  Cardiovascular status: hemodynamically stable  Respiratory status: acceptable and nasal cannula  Hydration status: stable  Pain management: adequate    No notable events documented.

## 2024-06-26 NOTE — H&P
[address] [address]                            PREOPERATIVE H&P      PATIENT NAME: JARRELL DUONG             : 1965  MED REC NO: [id number]                       ROOM: [facility]                                               (General) POOL                                               RM    ACCOUNT NO: [id number]                       ADMIT DATE: 2024  PROVIDER: DUGLAS Art      PLANNED SURGERY:  L2-5 laminectomy and posterior spinal fusion with Dr. Alec Wang on 2024 at [facility].    CHIEF COMPLAINT:  Low back pain and bilateral lower extremity radicular pain.    HISTORY OF PRESENT ILLNESS:  The patient is a 59-year-old female who presented to our office with complaints of significant low back pain and bilateral lower extremity radiculopathy with symptoms of heaviness causing the inability to stand and walk more than 15 minutes at a time.  She has a previous history of an L4-5 laminectomy completed at [facility] by Dr. Del Rio in  and is also known to us having been through previous cervical spine surgery including removal of plate C5-7 with ACDF C7-T1.  She has continued to struggle some due to the back pain which has really limited her day-to-day activity.  She has currently been in physical therapy, which has really not shown significant improvement of her back or leg discomfort.  She is struggling to mobilize and has had a substantial decrease in quality of life.  Imaging has demonstrated evidence of multilevel spinal stenosis and she elects to proceed with further operative management.  She has been cleared by her family provider, Dr. Hank To to undergo this operation.    ALLERGIES:  DRUG ALLERGIES INCLUDE AMOXICILLIN, OMNICEF.      PAST MEDICAL HISTORY:  Includes gastroesophageal reflux disease, thyroid disease.    CURRENT MEDICATIONS:  Includes amlodipine, fexofenadine, propranolol,  omeprazole, mometasone, baclofen, levothyroxine, duloxetine, Humira.    PAST SURGICAL HISTORY:  Includes left carpal tunnel release, right carpal tunnel release, C7-T1 ACDF with plate removal C5-6, performed by Dr. Wang in August of 2023, and L4-5 laminectomy with Dr. Del Rio in 2021.    SOCIAL HISTORY:  Smoking status, she is a nonsmoker.  She is working full time.  Lives at home with her spouse in a private home.    REVIEW OF SYSTEMS:  GENERAL:  Denies fever, fatigue, or chills.  RESPIRATORY:  Denies shortness of breath, productive cough, wheezing.  CARDIOVASCULAR:  Denies chest pain, palpitations, leg swelling.  GASTROINTESTINAL:  Denies nausea, vomiting, diarrhea, abdominal pain.  GENITOURINARY:  Denies history of bladder incontinence.  NEUROLOGIC:  Denies seizure, blackout, fainting, or headaches.  MUSCULOSKELETAL:  Significant for back pain, hip pain, and leg pain.    PHYSICAL EXAMINATION:  VITAL SIGNS:  Most recent vital signs show height 5 feet 7-1/2 inches, weight 287, BMI 44.28.  GENERAL:  The patient is pleasant, cooperative, awake, alert, and oriented x3.  She is seated in a chair, in no acute distress.  MUSCULOSKELETAL:  Lumbar exam shows a midline scar present.  EXTREMITIES:  Bilateral lower extremity exam, skin is warm, dry, and intact.  Pulses +2 in the posterior tibial artery bilaterally.  Calves nontender, without evidence of DVT.  Strength is maintained 5/5 with pedal push and pull, great toe extension and knee extension.  Bilateral sensation is intact.    IMAGING:  Lumbar x-rays completed 03/26/2024 are reviewed, showing scoliosis, curvature, convex left apex at L3-4, retrolisthesis of L2 over L3, laminar defect L4-5.  On lateral view, lordosis is intact with anterolisthesis of L4 over L5 measuring 2 mm.  On flexion, the anterolisthesis of L4-5 increases to 4.1 mm.    MRI 04/17/2024 completed at [place] demonstrates previous laminectomy at L4-5.  At L2-3, there is a diffuse disk bulge with

## 2024-06-26 NOTE — ANESTHESIA PRE PROCEDURE
Department of Anesthesiology  Preprocedure Note       Name:  Dasha Garcia   Age:  59 y.o.  :  1965                                          MRN:  [id number]         Date:  2024      Surgeon: Surgeon(s):  Alec Wang MD    Procedure: Procedure(s):  L2-L5 Zach/ PSF    Medications prior to admission:   Prior to Admission medications    Medication Sig Start Date End Date Taking? Authorizing Provider   Adalimumab-atto (AMJEVITA) 40 MG/0.4ML SOAJ Inject 40 mg into the skin once a week   Yes Ayana Rogers MD   mometasone (NASONEX) 50 MCG/ACT nasal spray 2 sprays by Each Nostril route daily   Yes Ayana Rogers MD   DULoxetine (CYMBALTA) 60 MG extended release capsule Take 30 mg by mouth daily 23   Ayana Rogers MD   amLODIPine (NORVASC) 5 MG tablet Take 2 tablets by mouth daily 23   Ayana Rogers MD   fexofenadine (ALLEGRA) 180 MG tablet Take 1 tablet by mouth daily    Ayana Rogers MD   Multiple Vitamins-Minerals (THERAPEUTIC MULTIVITAMIN-MINERALS) tablet Take 1 tablet by mouth daily    Ayana Rogers MD   Lactobacillus (PROBIOTIC ACIDOPHILUS PO) Take 1 capsule by mouth daily    Ayana Rogers MD   docusate sodium (COLACE) 100 MG capsule Take 2 capsules by mouth daily    Ayana Rogers MD   omeprazole (PRILOSEC) 40 MG delayed release capsule Take 1 capsule by mouth daily    Ayana Rogers MD   propranolol (INDERAL LA) 60 MG extended release capsule Take 2 capsules by mouth daily    Ayana Rogers MD   levothyroxine (SYNTHROID) 25 MCG tablet Take 1 tablet by mouth Daily    Ayana Rogers MD       Current medications:    Current Facility-Administered Medications   Medication Dose Route Frequency Provider Last Rate Last Admin   • sodium chloride flush 0.9 % injection 5-40 mL  5-40 mL IntraVENous 2 times per day Marlon Orr PA-C       • sodium chloride flush 0.9 % injection 5-40 mL  5-40 mL IntraVENous PRN

## 2024-06-27 LAB
BASOPHILS ABSOLUTE: 0 THOU/MM3 (ref 0–0.1)
BASOPHILS NFR BLD AUTO: 0.1 %
DEPRECATED RDW RBC AUTO: 47.1 FL (ref 35–45)
EOSINOPHIL NFR BLD AUTO: 0 %
EOSINOPHILS ABSOLUTE: 0 THOU/MM3 (ref 0–0.4)
ERYTHROCYTE [DISTWIDTH] IN BLOOD BY AUTOMATED COUNT: 13.7 % (ref 11.5–14.5)
HCT VFR BLD AUTO: 33.2 % (ref 37–47)
HGB BLD-MCNC: 10.6 GM/DL (ref 12–16)
IMM GRANULOCYTES # BLD AUTO: 0.06 THOU/MM3 (ref 0–0.07)
IMM GRANULOCYTES NFR BLD AUTO: 0.6 %
LYMPHOCYTES ABSOLUTE: 1.3 THOU/MM3 (ref 1–4.8)
LYMPHOCYTES NFR BLD AUTO: 13.6 %
MCH RBC QN AUTO: 29.7 PG (ref 26–33)
MCHC RBC AUTO-ENTMCNC: 31.9 GM/DL (ref 32.2–35.5)
MCV RBC AUTO: 93 FL (ref 81–99)
MONOCYTES ABSOLUTE: 0.7 THOU/MM3 (ref 0.4–1.3)
MONOCYTES NFR BLD AUTO: 7.1 %
NEUTROPHILS ABSOLUTE: 7.5 THOU/MM3 (ref 1.8–7.7)
NEUTROPHILS NFR BLD AUTO: 78.6 %
NRBC BLD AUTO-RTO: 0 /100 WBC
PLATELET # BLD AUTO: 199 THOU/MM3 (ref 130–400)
PMV BLD AUTO: 10 FL (ref 9.4–12.4)
RBC # BLD AUTO: 3.57 MILL/MM3 (ref 4.2–5.4)
WBC # BLD AUTO: 9.6 THOU/MM3 (ref 4.8–10.8)

## 2024-06-27 PROCEDURE — 0SG1071 FUSION OF 2 OR MORE LUMBAR VERTEBRAL JOINTS WITH AUTOLOGOUS TISSUE SUBSTITUTE, POSTERIOR APPROACH, POSTERIOR COLUMN, OPEN APPROACH: ICD-10-PCS | Performed by: ORTHOPAEDIC SURGERY

## 2024-06-27 PROCEDURE — 1200000000 HC SEMI PRIVATE

## 2024-06-27 PROCEDURE — 01NB0ZZ RELEASE LUMBAR NERVE, OPEN APPROACH: ICD-10-PCS | Performed by: ORTHOPAEDIC SURGERY

## 2024-06-27 PROCEDURE — 6370000000 HC RX 637 (ALT 250 FOR IP): Performed by: PHYSICIAN ASSISTANT

## 2024-06-27 PROCEDURE — 6360000002 HC RX W HCPCS: Performed by: PHYSICIAN ASSISTANT

## 2024-06-27 PROCEDURE — 97116 GAIT TRAINING THERAPY: CPT

## 2024-06-27 PROCEDURE — 97165 OT EVAL LOW COMPLEX 30 MIN: CPT

## 2024-06-27 PROCEDURE — 36415 COLL VENOUS BLD VENIPUNCTURE: CPT

## 2024-06-27 PROCEDURE — 2580000003 HC RX 258: Performed by: PHYSICIAN ASSISTANT

## 2024-06-27 PROCEDURE — 85025 COMPLETE CBC W/AUTO DIFF WBC: CPT

## 2024-06-27 PROCEDURE — 97535 SELF CARE MNGMENT TRAINING: CPT

## 2024-06-27 PROCEDURE — 97162 PT EVAL MOD COMPLEX 30 MIN: CPT

## 2024-06-27 RX ADMIN — DULOXETINE HYDROCHLORIDE 30 MG: 30 CAPSULE, DELAYED RELEASE ORAL at 09:26

## 2024-06-27 RX ADMIN — ACETAMINOPHEN 650 MG: 325 TABLET ORAL at 16:09

## 2024-06-27 RX ADMIN — SENNOSIDES AND DOCUSATE SODIUM 1 TABLET: 50; 8.6 TABLET ORAL at 09:26

## 2024-06-27 RX ADMIN — SENNOSIDES AND DOCUSATE SODIUM 1 TABLET: 50; 8.6 TABLET ORAL at 20:31

## 2024-06-27 RX ADMIN — AMLODIPINE BESYLATE 10 MG: 10 TABLET ORAL at 09:26

## 2024-06-27 RX ADMIN — ACETAMINOPHEN 650 MG: 325 TABLET ORAL at 09:26

## 2024-06-27 RX ADMIN — NALOXEGOL OXALATE 12.5 MG: 12.5 TABLET, FILM COATED ORAL at 05:03

## 2024-06-27 RX ADMIN — PROPRANOLOL HYDROCHLORIDE 120 MG: 60 CAPSULE, EXTENDED RELEASE ORAL at 09:25

## 2024-06-27 RX ADMIN — PANTOPRAZOLE SODIUM 40 MG: 40 TABLET, DELAYED RELEASE ORAL at 05:03

## 2024-06-27 RX ADMIN — SODIUM CHLORIDE, PRESERVATIVE FREE 10 ML: 5 INJECTION INTRAVENOUS at 20:31

## 2024-06-27 RX ADMIN — ACETAMINOPHEN 650 MG: 325 TABLET ORAL at 00:38

## 2024-06-27 RX ADMIN — DOCUSATE SODIUM 200 MG: 100 CAPSULE, LIQUID FILLED ORAL at 09:26

## 2024-06-27 RX ADMIN — CEFAZOLIN 3000 MG: 10 INJECTION, POWDER, FOR SOLUTION INTRAVENOUS at 01:12

## 2024-06-27 RX ADMIN — OXYCODONE HYDROCHLORIDE 5 MG: 5 TABLET ORAL at 04:08

## 2024-06-27 RX ADMIN — OXYCODONE HYDROCHLORIDE 10 MG: 5 TABLET ORAL at 22:48

## 2024-06-27 RX ADMIN — SODIUM CHLORIDE: 9 INJECTION, SOLUTION INTRAVENOUS at 02:54

## 2024-06-27 RX ADMIN — POLYETHYLENE GLYCOL 3350 17 G: 17 POWDER, FOR SOLUTION ORAL at 09:26

## 2024-06-27 RX ADMIN — ACETAMINOPHEN 650 MG: 325 TABLET ORAL at 04:08

## 2024-06-27 RX ADMIN — ACETAMINOPHEN 650 MG: 325 TABLET ORAL at 20:31

## 2024-06-27 RX ADMIN — ACETAMINOPHEN 650 MG: 325 TABLET ORAL at 11:44

## 2024-06-27 RX ADMIN — LEVOTHYROXINE SODIUM 25 MCG: 0.03 TABLET ORAL at 05:03

## 2024-06-27 ASSESSMENT — PAIN DESCRIPTION - LOCATION
LOCATION: BACK

## 2024-06-27 ASSESSMENT — PAIN SCALES - GENERAL
PAINLEVEL_OUTOF10: 3
PAINLEVEL_OUTOF10: 2
PAINLEVEL_OUTOF10: 2
PAINLEVEL_OUTOF10: 4
PAINLEVEL_OUTOF10: 7
PAINLEVEL_OUTOF10: 2
PAINLEVEL_OUTOF10: 2
PAINLEVEL_OUTOF10: 4
PAINLEVEL_OUTOF10: 3
PAINLEVEL_OUTOF10: 2
PAINLEVEL_OUTOF10: 3

## 2024-06-27 ASSESSMENT — PAIN - FUNCTIONAL ASSESSMENT

## 2024-06-27 ASSESSMENT — PAIN DESCRIPTION - DESCRIPTORS
DESCRIPTORS: ACHING
DESCRIPTORS: ACHING
DESCRIPTORS: DISCOMFORT
DESCRIPTORS: ACHING;DISCOMFORT
DESCRIPTORS: ACHING

## 2024-06-27 ASSESSMENT — PAIN DESCRIPTION - ONSET: ONSET: ON-GOING

## 2024-06-27 ASSESSMENT — PAIN DESCRIPTION - ORIENTATION
ORIENTATION: MID;LOWER
ORIENTATION: LOWER
ORIENTATION: LOWER
ORIENTATION: MID;LOWER
ORIENTATION: LOWER

## 2024-06-27 ASSESSMENT — PAIN DESCRIPTION - FREQUENCY: FREQUENCY: CONTINUOUS

## 2024-06-27 ASSESSMENT — PAIN DESCRIPTION - PAIN TYPE: TYPE: SURGICAL PAIN

## 2024-06-27 NOTE — PROGRESS NOTES
[facility]  INPATIENT PHYSICAL THERAPY  EVALUATION  [facility] ORTHOPEDICS 7K - 7K-18/018-A    Time In: 0833  Time Out: 0859  Timed Code Treatment Minutes: 18 Minutes  Minutes: 26          Date: 2024  Patient Name: Dasha Garcia,  Gender:  female        MRN: [id number]  : 1965  (59 y.o.)      Referring Practitioner: Marlon Orr PA-C  Diagnosis: Spinal stenosis of lumbar region with neurogenic claudication  Additional Pertinent Hx: Per EMR \"The patient is a 59-year-old female who presented to our office with complaints of significant low back pain and bilateral lower extremity radiculopathy with symptoms of heaviness causing the inability to stand and walk more than 15 minutes at a time.  She has a previous history of an L4-5 laminectomy completed at [facility] by Dr. Del Rio in  and is also known to us having been through previous cervical spine surgery including removal of plate C5-7 with ACDF C7-T1.  She has continued to struggle some due to the back pain which has really limited her day-to-day activity.  She has currently been in physical therapy, which has really not shown significant improvement of her back or leg discomfort.  She is struggling to mobilize and has had a substantial decrease in quality of life.  Imaging has demonstrated evidence of multilevel spinal stenosis and she elects to proceed with further operative management.  She has been cleared by her family provider, Dr. Hank To to undergo this operation.\" Pt status post op lumbar laminectomy and fusion 24 .     Restrictions/Precautions:  Restrictions/Precautions: Surgical Protocols, General Precautions, Fall Risk  Required Braces or Orthoses  Spinal: Lumbar Corset  Position Activity Restriction  Spinal Precautions: No Bending, No Lifting, No Twisting  Other position/activity restrictions: Drains X2    Subjective:  Chart Reviewed: Yes  Patient assessed for rehabilitation services?: Yes  Family /  increased time for completion  Pt donned brace sitting in chair. Educated on brace application. Completed with RW.     Ambulation:  Contact Guard Assistance  Distance: 250 ft  Surface: Level Tile  Device:Rolling Walker  Gait Deviations:  Forward Flexed Posture, Slow Marivel, Decreased Step Length Bilaterally, Decreased Trunk Rotation, and Decreased Gait Speed  Pt displayed safety awareness and no cueing needed.       Functional Outcome Measures:   [facility] (6 CLICK) BASIC MOBILITY  AM-PAC Inpatient Mobility Raw Score : 18  AM-PAC Inpatient T-Scale Score : 43.63  Mobility Inpatient CMS 0-100% Score: 46.58  Mobility Inpatient CMS G-Code Modifier : CK        Modified Abi Scale:  Not Applicable    ASSESSMENT:  Activity Tolerance:  Patient tolerance of  treatment: good.       Treatment Initiated: Treatment and education initiated within context of evaluation.  Evaluation time included review of current medical information, gathering information related to past medical, social and functional history, completion of standardized testing, formal and informal observation of tasks, assessment of data and development of plan of care and goals.  Treatment time included skilled education and facilitation of tasks to increase safety and independence with functional mobility for improved independence and quality of life.    Assessment:  Body Structures, Functions, Activity Limitations Requiring Skilled Therapeutic Intervention: Decreased functional mobility , Decreased balance, Decreased endurance, Decreased strength  Assessment: Pt is 59 YOF that presents with dec functional mobility, endurance, amb, and balance. Pt requires 1 person assist for functional tasks and RW. Pt requires PT to inc independence and progress to PLOF  Therapy Prognosis: Good    Requires PT Follow-Up: Yes    Discharge Recommendations:  Discharge Recommendations: Continue to assess pending progress, Home with assist PRN May benefit from outpatient PT services

## 2024-06-27 NOTE — PLAN OF CARE
Problem: Discharge Planning  Goal: Discharge to home or other facility with appropriate resources  6/26/2024 2346 by Shahida Morris RN  Outcome: Progressing  Flowsheets (Taken 6/26/2024 2346)  Discharge to home or other facility with appropriate resources:   Identify barriers to discharge with patient and caregiver   Arrange for needed discharge resources and transportation as appropriate   Identify discharge learning needs (meds, wound care, etc)     Problem: Pain  Goal: Verbalizes/displays adequate comfort level or baseline comfort level  6/26/2024 2346 by Shahida Morris RN  Outcome: Progressing  Flowsheets (Taken 6/26/2024 2346)  Verbalizes/displays adequate comfort level or baseline comfort level:   Encourage patient to monitor pain and request assistance   Assess pain using appropriate pain scale   Administer analgesics based on type and severity of pain and evaluate response   Implement non-pharmacological measures as appropriate and evaluate response     Problem: ABCDS Injury Assessment  Goal: Absence of physical injury  6/26/2024 2346 by Shahida Morris RN  Outcome: Progressing  Flowsheets (Taken 6/26/2024 2346)  Absence of Physical Injury: Implement safety measures based on patient assessment     Problem: Safety - Adult  Goal: Free from fall injury  Outcome: Progressing  Flowsheets (Taken 6/26/2024 2346)  Free From Fall Injury: Instruct family/caregiver on patient safety     Problem: Skin/Tissue Integrity  Goal: Absence of new skin breakdown  Description: 1.  Monitor for areas of redness and/or skin breakdown  2.  Assess vascular access sites hourly  3.  Every 4-6 hours minimum:  Change oxygen saturation probe site  4.  Every 4-6 hours:  If on nasal continuous positive airway pressure, respiratory therapy assess nares and determine need for appliance change or resting period.  Outcome: Progressing     Care plan reviewed with patient. Patient verbalized understanding of the plan of care and contribute to

## 2024-06-27 NOTE — PROGRESS NOTES
Department of Orthopedic Surgery  Spine Service  Progress Note        Subjective:    Dasha was seen this am sitting up in chair with reports of mild improvement of her radicular sx. She is ready to be up with PT/OT.     Vitals  VITALS:  /72   Pulse 91   Temp 98.3 °F (36.8 °C) (Oral)   Resp 17   Ht 1.727 m (5' 8\")   Wt 134.1 kg (295 lb 9.6 oz)   SpO2 91%   BMI 44.95 kg/m²   24HR INTAKE/OUTPUT:    Intake/Output Summary (Last 24 hours) at 6/27/2024 0628  Last data filed at 6/27/2024 0544  Gross per 24 hour   Intake 5290 ml   Output 3895 ml   Net 1395 ml     URINARY CATHETER OUTPUT (Yanes):  Urinary Catheter 06/26/24 Nzplb-Oyuinscmoxq-Psprzg (mL): 550 mL  DRAIN/TUBE OUTPUT:  Closed/Suction Drain Left Back Bulb-Output (ml): 25 ml  Closed/Suction Drain Right Back Bulb-Output (ml): 120 ml      PHYSICAL EXAM:    Orientation:  alert and oriented to person, place and time    Incision:  dressing in place, clean, dry, intact      Lower Extremity Motor :  quadriceps, extensor hallucis longus, dorsiflexion, plantarflexion 5/5 bilaterally  Lower Extremity Sensory:  Intact L1-S1    Flatus:  negative    ABNORMAL EXAM FINDINGS:  none    LABS:    HgB:    Lab Results   Component Value Date/Time    HGB 10.6 06/27/2024 04:30 AM    HGB 12.1 03/18/2012 12:30 PM     CBC with Differential:    Lab Results   Component Value Date/Time    WBC 9.6 06/27/2024 04:30 AM    RBC 3.57 06/27/2024 04:30 AM    HGB 10.6 06/27/2024 04:30 AM    HGB 12.1 03/18/2012 12:30 PM    HCT 33.2 06/27/2024 04:30 AM     06/27/2024 04:30 AM    MCV 93.0 06/27/2024 04:30 AM    MCH 29.7 06/27/2024 04:30 AM    MCHC 31.9 06/27/2024 04:30 AM    NRBC 0 06/27/2024 04:30 AM    MONOPCT 7.1 06/27/2024 04:30 AM    MONOSABS 0.7 06/27/2024 04:30 AM    EOSABS 0.0 06/27/2024 04:30 AM    BASOSABS 0.0 06/27/2024 04:30 AM       ASSESSMENT AND PLAN:    Post operative day 1     1:  Monitor labs and drain output  2:  Activity Level:  Activity as tolerated with PT/OT   3:

## 2024-06-27 NOTE — OP NOTE
[address] [address]                            OPERATIVE REPORT      PATIENT NAME: JARRELL DUONG             : 1965  MED REC NO: [id number]                       ROOM: [facility]  ACCOUNT NO: [id number]                       ADMIT DATE: 2024  PROVIDER: Alec Wang MD      DATE OF PROCEDURE:  2024    SURGEON:  Alec Wang MD    PREOPERATIVE DIAGNOSES:    1. Lumbar spinal stenosis.  2. Status post previous lumbar spine, L4-L5 decompressive laminectomy in 2021 by Dr. Del Rio.  3. Symptoms of low back pain and lower extremity radiculopathy with intermittent claudication.  4. Obesity, body mass index of 44.94 as the patient is 5 feet 8 inches in height and 225 pounds in weight.    POSTOPERATIVE DIAGNOSES:    1. Lumbar spinal stenosis.  2. Status post previous lumbar spine, L4-L5 decompressive laminectomy in 2021 by Dr. Del Rio.  3. Symptoms of low back pain and lower extremity radiculopathy with intermittent claudication.  4. Obesity, body mass index of 44.94 as the patient is 5 feet 8 inches in height and 225 pounds in weight.    PROCEDURES PERFORMED:    1. Revision bilateral L4 hemilaminectomy with revision bilateral L4 nerve root exploration.  2. L2, L3, and L5 lumbar laminectomies.  3. L2-L3, L3-L4, and L4-L5 bilateral posterolateral fusion of lumbar spine.  4. Use of allograft bone graft DBM, the Xtant FiberX 5 mL kit and a 5 mL kit of the ProteiOS growth factor mixed with a 5 cm boat of the Xtant BioAdaptive bridge for this L2 through L5 fusion.  5. Use of all local bone cleaned of soft tissue for use in this lumbar spine fusion run through a bone mill for the fusion over levels of L2 through L5 bilateral.  6. Instrumentation of lumbar spine at L2, L3, L4, and L5 bilateral segmental with use of the Cortera system by Xtant placed over each of these levels with bridging titanium rods.  7. Increase of operative

## 2024-06-28 LAB
BASOPHILS ABSOLUTE: 0 THOU/MM3 (ref 0–0.1)
BASOPHILS NFR BLD AUTO: 0.5 %
DEPRECATED RDW RBC AUTO: 49.1 FL (ref 35–45)
EOSINOPHIL NFR BLD AUTO: 0.8 %
EOSINOPHILS ABSOLUTE: 0.1 THOU/MM3 (ref 0–0.4)
ERYTHROCYTE [DISTWIDTH] IN BLOOD BY AUTOMATED COUNT: 14.2 % (ref 11.5–14.5)
HCT VFR BLD AUTO: 32.9 % (ref 37–47)
HGB BLD-MCNC: 10.5 GM/DL (ref 12–16)
IMM GRANULOCYTES # BLD AUTO: 0.04 THOU/MM3 (ref 0–0.07)
IMM GRANULOCYTES NFR BLD AUTO: 0.5 %
LYMPHOCYTES ABSOLUTE: 1.7 THOU/MM3 (ref 1–4.8)
LYMPHOCYTES NFR BLD AUTO: 20.1 %
MCH RBC QN AUTO: 30.4 PG (ref 26–33)
MCHC RBC AUTO-ENTMCNC: 31.9 GM/DL (ref 32.2–35.5)
MCV RBC AUTO: 95.4 FL (ref 81–99)
MONOCYTES ABSOLUTE: 0.9 THOU/MM3 (ref 0.4–1.3)
MONOCYTES NFR BLD AUTO: 10.3 %
NEUTROPHILS ABSOLUTE: 5.6 THOU/MM3 (ref 1.8–7.7)
NEUTROPHILS NFR BLD AUTO: 67.8 %
NRBC BLD AUTO-RTO: 0 /100 WBC
PLATELET # BLD AUTO: 159 THOU/MM3 (ref 130–400)
PMV BLD AUTO: 10.1 FL (ref 9.4–12.4)
RBC # BLD AUTO: 3.45 MILL/MM3 (ref 4.2–5.4)
WBC # BLD AUTO: 8.3 THOU/MM3 (ref 4.8–10.8)

## 2024-06-28 PROCEDURE — 97116 GAIT TRAINING THERAPY: CPT

## 2024-06-28 PROCEDURE — 97530 THERAPEUTIC ACTIVITIES: CPT

## 2024-06-28 PROCEDURE — 6370000000 HC RX 637 (ALT 250 FOR IP): Performed by: PHYSICIAN ASSISTANT

## 2024-06-28 PROCEDURE — 97110 THERAPEUTIC EXERCISES: CPT

## 2024-06-28 PROCEDURE — 6360000002 HC RX W HCPCS: Performed by: PHYSICIAN ASSISTANT

## 2024-06-28 PROCEDURE — 85025 COMPLETE CBC W/AUTO DIFF WBC: CPT

## 2024-06-28 PROCEDURE — 1200000000 HC SEMI PRIVATE

## 2024-06-28 PROCEDURE — 36415 COLL VENOUS BLD VENIPUNCTURE: CPT

## 2024-06-28 PROCEDURE — 2580000003 HC RX 258: Performed by: PHYSICIAN ASSISTANT

## 2024-06-28 RX ORDER — DOCUSATE SODIUM 100 MG/1
100 CAPSULE, LIQUID FILLED ORAL 2 TIMES DAILY
Qty: 60 CAPSULE | Refills: 0 | Status: SHIPPED | OUTPATIENT
Start: 2024-06-28 | End: 2024-07-28

## 2024-06-28 RX ORDER — CYCLOBENZAPRINE HCL 10 MG
10 TABLET ORAL 3 TIMES DAILY PRN
Qty: 30 TABLET | Refills: 0 | Status: SHIPPED | OUTPATIENT
Start: 2024-06-28 | End: 2024-07-08

## 2024-06-28 RX ORDER — OXYCODONE HYDROCHLORIDE 5 MG/1
5 TABLET ORAL EVERY 6 HOURS PRN
Qty: 56 TABLET | Refills: 0 | Status: SHIPPED | OUTPATIENT
Start: 2024-06-28 | End: 2024-07-12

## 2024-06-28 RX ADMIN — ONDANSETRON 4 MG: 2 INJECTION INTRAMUSCULAR; INTRAVENOUS at 04:00

## 2024-06-28 RX ADMIN — PROPRANOLOL HYDROCHLORIDE 120 MG: 60 CAPSULE, EXTENDED RELEASE ORAL at 09:25

## 2024-06-28 RX ADMIN — SODIUM CHLORIDE, PRESERVATIVE FREE 10 ML: 5 INJECTION INTRAVENOUS at 09:29

## 2024-06-28 RX ADMIN — ACETAMINOPHEN 650 MG: 325 TABLET ORAL at 12:23

## 2024-06-28 RX ADMIN — SENNOSIDES AND DOCUSATE SODIUM 1 TABLET: 50; 8.6 TABLET ORAL at 09:25

## 2024-06-28 RX ADMIN — SENNOSIDES AND DOCUSATE SODIUM 1 TABLET: 50; 8.6 TABLET ORAL at 20:09

## 2024-06-28 RX ADMIN — PANTOPRAZOLE SODIUM 40 MG: 40 TABLET, DELAYED RELEASE ORAL at 05:42

## 2024-06-28 RX ADMIN — ACETAMINOPHEN 650 MG: 325 TABLET ORAL at 23:50

## 2024-06-28 RX ADMIN — BISACODYL 10 MG: 10 SUPPOSITORY RECTAL at 09:51

## 2024-06-28 RX ADMIN — ACETAMINOPHEN 650 MG: 325 TABLET ORAL at 09:25

## 2024-06-28 RX ADMIN — ACETAMINOPHEN 650 MG: 325 TABLET ORAL at 16:36

## 2024-06-28 RX ADMIN — NALOXEGOL OXALATE 12.5 MG: 12.5 TABLET, FILM COATED ORAL at 05:42

## 2024-06-28 RX ADMIN — ACETAMINOPHEN 650 MG: 325 TABLET ORAL at 20:09

## 2024-06-28 RX ADMIN — DOCUSATE SODIUM 200 MG: 100 CAPSULE, LIQUID FILLED ORAL at 09:25

## 2024-06-28 RX ADMIN — ACETAMINOPHEN 650 MG: 325 TABLET ORAL at 03:59

## 2024-06-28 RX ADMIN — AMLODIPINE BESYLATE 10 MG: 10 TABLET ORAL at 09:25

## 2024-06-28 RX ADMIN — LEVOTHYROXINE SODIUM 25 MCG: 0.03 TABLET ORAL at 05:42

## 2024-06-28 RX ADMIN — ONDANSETRON 4 MG: 2 INJECTION INTRAMUSCULAR; INTRAVENOUS at 20:12

## 2024-06-28 RX ADMIN — DOCUSATE SODIUM 283 MG: 283 LIQUID RECTAL at 17:38

## 2024-06-28 RX ADMIN — POLYETHYLENE GLYCOL 3350 17 G: 17 POWDER, FOR SOLUTION ORAL at 09:24

## 2024-06-28 RX ADMIN — SODIUM CHLORIDE, PRESERVATIVE FREE 10 ML: 5 INJECTION INTRAVENOUS at 20:09

## 2024-06-28 RX ADMIN — DULOXETINE HYDROCHLORIDE 30 MG: 30 CAPSULE, DELAYED RELEASE ORAL at 09:25

## 2024-06-28 ASSESSMENT — PAIN DESCRIPTION - ORIENTATION
ORIENTATION: LOWER
ORIENTATION: LOWER
ORIENTATION: MID;LOWER

## 2024-06-28 ASSESSMENT — PAIN DESCRIPTION - DESCRIPTORS
DESCRIPTORS: ACHING
DESCRIPTORS: ACHING;DISCOMFORT
DESCRIPTORS: ACHING

## 2024-06-28 ASSESSMENT — PAIN SCALES - GENERAL
PAINLEVEL_OUTOF10: 2
PAINLEVEL_OUTOF10: 5
PAINLEVEL_OUTOF10: 4
PAINLEVEL_OUTOF10: 4
PAINLEVEL_OUTOF10: 8
PAINLEVEL_OUTOF10: 2
PAINLEVEL_OUTOF10: 0
PAINLEVEL_OUTOF10: 6

## 2024-06-28 ASSESSMENT — PAIN DESCRIPTION - FREQUENCY
FREQUENCY: INTERMITTENT
FREQUENCY: CONTINUOUS
FREQUENCY: CONTINUOUS

## 2024-06-28 ASSESSMENT — PAIN DESCRIPTION - LOCATION
LOCATION: HEAD
LOCATION: HEAD
LOCATION: BACK
LOCATION: HEAD

## 2024-06-28 ASSESSMENT — PAIN - FUNCTIONAL ASSESSMENT
PAIN_FUNCTIONAL_ASSESSMENT: ACTIVITIES ARE NOT PREVENTED
PAIN_FUNCTIONAL_ASSESSMENT: ACTIVITIES ARE NOT PREVENTED
PAIN_FUNCTIONAL_ASSESSMENT: PREVENTS OR INTERFERES SOME ACTIVE ACTIVITIES AND ADLS
PAIN_FUNCTIONAL_ASSESSMENT: ACTIVITIES ARE NOT PREVENTED
PAIN_FUNCTIONAL_ASSESSMENT: PREVENTS OR INTERFERES SOME ACTIVE ACTIVITIES AND ADLS

## 2024-06-28 ASSESSMENT — PAIN DESCRIPTION - ONSET
ONSET: ON-GOING
ONSET: ON-GOING
ONSET: GRADUAL

## 2024-06-28 ASSESSMENT — PAIN DESCRIPTION - PAIN TYPE
TYPE: SURGICAL PAIN
TYPE: ACUTE PAIN
TYPE: ACUTE PAIN

## 2024-06-28 NOTE — PROGRESS NOTES
Department of Orthopedic Surgery  Spine Service  Progress Note        Subjective:    Dasha was seen this am sitting up in chair with reports of mild improvement of her radicular sx. She is ready to be up with PT/OT.     6/28/2024  Dasha is seen this am with complaints of incisional pain controlled with oral pain medications. She is ambulating with walker well. She is eating and drinking well. Is passing flatus with no reports of BM. She is ready to go home with  with HH for drain management.     Vitals  VITALS:  /80   Pulse 75   Temp 98.6 °F (37 °C) (Oral)   Resp 16   Ht 1.727 m (5' 8\")   Wt 134.1 kg (295 lb 9.6 oz)   SpO2 95%   BMI 44.95 kg/m²   24HR INTAKE/OUTPUT:    Intake/Output Summary (Last 24 hours) at 6/28/2024 0906  Last data filed at 6/28/2024 0848  Gross per 24 hour   Intake 1140 ml   Output 570 ml   Net 570 ml     URINARY CATHETER OUTPUT (Yanes):  [REMOVED] Urinary Catheter 06/26/24 Okgwr-Vffdyaqtldn-Ueliik (mL): 200 mL  DRAIN/TUBE OUTPUT:  Closed/Suction Drain Left Back Bulb-Output (ml): 10 ml  Closed/Suction Drain Right Back Bulb-Output (ml): 90 ml      PHYSICAL EXAM:    Orientation:  alert and oriented to person, place and time    Incision:  dressing in place, clean, dry, intact      Lower Extremity Motor :  quadriceps, extensor hallucis longus, dorsiflexion, plantarflexion 5/5 bilaterally  Lower Extremity Sensory:  Intact L1-S1    Flatus:  negative    ABNORMAL EXAM FINDINGS:  none    LABS:    HgB:    Lab Results   Component Value Date/Time    HGB 10.5 06/28/2024 04:24 AM    HGB 12.1 03/18/2012 12:30 PM     CBC with Differential:    Lab Results   Component Value Date/Time    WBC 8.3 06/28/2024 04:24 AM    RBC 3.45 06/28/2024 04:24 AM    HGB 10.5 06/28/2024 04:24 AM    HGB 12.1 03/18/2012 12:30 PM    HCT 32.9 06/28/2024 04:24 AM     06/28/2024 04:24 AM    MCV 95.4 06/28/2024 04:24 AM    MCH 30.4 06/28/2024 04:24 AM    MCHC 31.9 06/28/2024 04:24 AM    NRBC 0 06/28/2024  04:24 AM    MONOPCT 10.3 06/28/2024 04:24 AM    MONOSABS 0.9 06/28/2024 04:24 AM    EOSABS 0.1 06/28/2024 04:24 AM    BASOSABS 0.0 06/28/2024 04:24 AM       ASSESSMENT AND PLAN:    Post operative day 2    1:  Monitor labs and drain output  2:  Activity Level:  Activity as tolerated with PT/OT   3:  Pain Control:  Controlled  4:  Discharge Planning:  Pending clinical condition home with with  for drain management await  for discharge   5:  Work on BM

## 2024-06-28 NOTE — PROGRESS NOTES
[facility]  INPATIENT OCCUPATIONAL THERAPY  [facility] ORTHOPEDICS 7K  EVALUATION    Time:   Time In:   Time Out: 1720  Timed Code Treatment Minutes: 10 Minutes  Minutes: 25          Date: 2024  Patient Name: Dasha Garcia,   Gender: female      MRN: [id number]  : 1965  (59 y.o.)  Referring Practitioner: Marlon Orr PA-C  Diagnosis: Spinal Stenosis of Lumbar Region with neurogenic claudication  Additional Pertinent Hx: Pt admitted with low back pain and bilateral lower extremity radicular pain with symptoms of pain, weakness, and numbness and tingling with walking.  This has failed to improve despite conservative care including physical therapy.  These treatments have failed to improve her symptoms and she now seeks further definitive management.  It has been 3 years since her previous lumbar spine laminectomy, but she is now stenotic at this location, and above this at levels of L3-L4 and L4-L5.  Pt is S/P L2,L3, L5 laminectomy, L4 hemilamectomy revision, L2-L5 PLF and instrumentation placement on 24.    Restrictions/Precautions:  Restrictions/Precautions: Surgical Protocols, General Precautions, Fall Risk  Required Braces or Orthoses  Spinal: Lumbar Corset  Position Activity Restriction  Spinal Precautions: No Bending, No Lifting, No Twisting  Other position/activity restrictions: Drains X2    Subjective  Chart Reviewed: Yes, Orders, History and Physical, Other (comment) (PT evaluation)  Patient assessed for rehabilitation services?: Yes  Family / Caregiver Present: No    Subjective: Pleasant and cooperative  Comments: RN approved session. Pt agreed to go for a walk in the hallway after having used the bathroom.  Comments / Details: Pt remained in the recliner chair following the session.  She was eating supper.  Her pain levels were tolerable.    Pain: 4/10: While walking her back pain was highest.  She stated it was more tolerable while in sitting.    Vitals: Vitals not  assessed per clinical judgement, see nursing flowsheet    Social/Functional History:  Lives With: Spouse  Type of Home: House  Home Layout: One level  Home Access: Stairs to enter without rails  Entrance Stairs - Number of Steps: 2 steps from sidewalk and 1 from porch to enter, and 2 steps in garage with grab bar  Home Equipment: Walker - Rolling, Cane   Bathroom Shower/Tub: Walk-in shower  Bathroom Toilet: Handicap height  Bathroom Equipment: Built-in shower seat, Grab bars around toilet  Bathroom Accessibility: Accessible    Receives Help From: Family  ADL Assistance: Independent  Homemaking Assistance: Independent  Homemaking Responsibilities: Yes  Ambulation Assistance: Independent  Transfer Assistance: Independent    Active : Yes  Occupation: Full time employment  Type of Occupation: Special  at [employer]  Leisure & Hobbies: Reading; taking care of cat  Additional Comments: Pt reports 3 falls in the past year. Pt and  share homemaking.  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 Pt does have cat that can get in the way. No AD used prior.    VISION:Corrected    HEARING:  WFL    COGNITION: WFL    RANGE OF MOTION:  Bilateral Upper Extremity:  WFL    STRENGTH:  Bilateral Upper Extremity:  Not tested     SENSATION:   WFL    ADL:   Grooming: Stand By Assistance.  Washing her hands standing at the sink  Toileting: Supervision.  No difficulty noted with doing her own clothing management and wiping herself after her having voided.  Toilet Transfer: Modified Independent. To/from the standard toilet seat using a grabbar.  Pt could adjust the straps of her lumbar corset with SBA and verbal cues.  IADL:   Not Tested    BALANCE:  Sitting Balance:  Stand By Assistance. Scooting forward in the chair.   Standing Balance: Stand By Assistance. Grooming at the sink; adjusting her brace and preparing to go for a walk.    BED MOBILITY:  Not Tested    TRANSFERS:  Sit

## 2024-06-28 NOTE — PLAN OF CARE
Problem: Discharge Planning  Goal: Discharge to home or other facility with appropriate resources  6/28/2024 0105 by Shahida Morris RN  Outcome: Progressing  Flowsheets (Taken 6/28/2024 0105)  Discharge to home or other facility with appropriate resources:   Identify barriers to discharge with patient and caregiver   Arrange for needed discharge resources and transportation as appropriate   Identify discharge learning needs (meds, wound care, etc)     Problem: Pain  Goal: Verbalizes/displays adequate comfort level or baseline comfort level  6/28/2024 0105 by Shahida Morris RN  Outcome: Progressing  Flowsheets (Taken 6/28/2024 0105)  Verbalizes/displays adequate comfort level or baseline comfort level:   Encourage patient to monitor pain and request assistance   Assess pain using appropriate pain scale   Administer analgesics based on type and severity of pain and evaluate response   Implement non-pharmacological measures as appropriate and evaluate response     Problem: ABCDS Injury Assessment  Goal: Absence of physical injury  6/28/2024 0105 by Shahida Morris RN  Outcome: Progressing  Flowsheets (Taken 6/28/2024 0105)  Absence of Physical Injury: Implement safety measures based on patient assessment     Problem: Safety - Adult  Goal: Free from fall injury  6/28/2024 0105 by Shahida Morris RN  Outcome: Progressing  Flowsheets (Taken 6/28/2024 0105)  Free From Fall Injury: Instruct family/caregiver on patient safety     Problem: Skin/Tissue Integrity  Goal: Absence of new skin breakdown  Description: 1.  Monitor for areas of redness and/or skin breakdown  2.  Assess vascular access sites hourly  3.  Every 4-6 hours minimum:  Change oxygen saturation probe site  4.  Every 4-6 hours:  If on nasal continuous positive airway pressure, respiratory therapy assess nares and determine need for appliance change or resting period.  6/28/2024 0105 by Shahida Morris, RN  Outcome: Progressing     Problem: Musculoskeletal -

## 2024-06-28 NOTE — PROGRESS NOTES
[facility]  STR ORTHOPEDICS 7K  Occupational Therapy  Daily Note  Time:   Time In: 925  Time Out: 951  Timed Code Treatment Minutes: 26 Minutes  Minutes: 26          Date: 2024  Patient Name: Dasha Garcia,   Gender: female      Room: [facility]18/018-A  MRN: [id number]  : 1965  (59 y.o.)  Referring Practitioner: Marlon Orr PA-C  Diagnosis: Spinal Stenosis of Lumbar Region with neurogenic claudication  Additional Pertinent Hx: Pt admitted with low back pain and bilateral lower extremity radicular pain with symptoms of pain, weakness, and numbness and tingling with walking.  This has failed to improve despite conservative care including physical therapy.  These treatments have failed to improve her symptoms and she now seeks further definitive management.  It has been 3 years since her previous lumbar spine laminectomy, but she is now stenotic at this location, and above this at levels of L3-L4 and L4-L5.  Pt is S/P L2,L3, L5 laminectomy, L4 hemilamectomy revision, L2-L5 PLF and instrumentation placement on 24.    Restrictions/Precautions:  Restrictions/Precautions: Surgical Protocols, General Precautions, Fall Risk  Required Braces or Orthoses  Spinal: Lumbar Corset  Position Activity Restriction  Spinal Precautions: No Bending, No Lifting, No Twisting  Other position/activity restrictions: Drains X2     Social/Functional History:  Lives With: Spouse  Type of Home: House  Home Layout: One level  Home Access: Stairs to enter without rails  Entrance Stairs - Number of Steps: 2 steps from sidewalk and 1 from porch to enter, and 2 steps in garage with grab bar  Home Equipment: Walker - Rolling, Cane   Bathroom Shower/Tub: Walk-in shower  Bathroom Toilet: Handicap height  Bathroom Equipment: Built-in shower seat, Grab bars around toilet  Bathroom Accessibility: Accessible    Receives Help From: Family  ADL Assistance: Independent  Homemaking Assistance: Independent  Homemaking  Responsibilities: Yes  Ambulation Assistance: Independent  Transfer Assistance: Independent    Active : Yes  Occupation: Full time employment  Type of Occupation: Special  at [employer]  Leisure & Hobbies: Reading; taking care of cat  Additional Comments: Pt reports 3 falls in the past year. Pt and  share homemaking.  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 Pt does have cat that can get in the way. No AD used prior.    SUBJECTIVE: pt sitting in recliner getting ready to take pills.   Pt agreeable to OT     PAIN: pt stated not much pain     Vitals: Vitals not assessed per clinical judgement, see nursing flowsheet    COGNITION: Decreased Problem Solving and Decreased Safety Awareness  Pt able to recall 3/3 back precautions     ADL:   Toilet Transfer: Contact Guard Assistance. To STS   .pt stated has all equip needs at home.   Reacher and sock aide and has used before.     IADL:   Not Tested    BALANCE:  Standing Balance: Contact Guard Assistance. At RW    BED MOBILITY:  Not Tested    TRANSFERS:  Sit to Stand:  CGA  Fromrecliner   Stand to Sit: Contact Guard Assistance. To STS     FUNCTIONAL MOBILITY:  Assistive Device: Rolling Walker  Assist Level:  Contact Guard Assistance.   Distance: To and from bathroom and into hallway of unit   Pt had no LOB and demo good posutre and pace      ADDITIONAL ACTIVITIES:  .Patient completed dynamic standing task that facilitated 1-2 hand release . Patient required CGA , and demo'ed an endurance of 1 -3  minutes. Demo good  tolerance with short  rest breaks. Standing task completed to challenge endurance and balance required for ADL and IADL skills.     AM-[facility] Inpatient Daily Activity Raw Score: 22  AM-PAC Inpatient ADL T-Scale Score : 47.1  ADL Inpatient CMS 0-100% Score: 25.8    Modified Abi Scale:  Not Applicable    ASSESSMENT:  Assessment:   Activity Tolerance:  Patient tolerance of  treatment: Good treatment

## 2024-06-28 NOTE — CARE COORDINATION
6/28/24, 10:11 AM EDT    DISCHARGE PLANNING EVALUATION    Plans home with [facility].   Notified [facility] of discharge today.   [facility] will follow.   AVS faxed.     6/28/24, 11:44 AM EDT    Patient goals/plan/ treatment preferences discussed by  and .  Patient goals/plan/ treatment preferences reviewed with patient/ family.  Patient/ family verbalize understanding of discharge plan and are in agreement with goal/plan/treatment preferences.  Understanding was demonstrated using the teach back method.  AVS provided by RN at time of discharge, which includes all necessary medical information pertaining to the patients current course of illness, treatment, post-discharge goals of care, and treatment preferences.     Services At/After Discharge: Home Health        
DISCHARGE PLANNING EVALUATION  6/27/24, 11:39 AM EDT    Reason for Referral: home health  Decision Maker: makes own decisions, no POA  Current Services: none  New Services Requested: home health  Family/ Social/ Home environment: patient lives at home with her  who can assist her with care at home.  She has been independent prior to admission  Payment Source:[employer]  Transportation at Discharge: family  Post-acute (PAC) provider list was provided to patient. Patient was informed of their freedom to choose PAC provider. Discussed and offered to show the patient the relevant PAC Providers quality and resource use measures on Medicare Compare web site via computer based on patient's goals of care and treatment preferences. Questions regarding selection process were answered.      Teach Back Method used with patient regarding care plan and discharge plan  Patient  verbalized understanding of the plan of care and contribute to goal setting.       Patient preferences and discharge plan:  spoke with patient about discharge plan.  She plans home with her , prefers [facility] for drain management, she had [facility] before, prefers [facility] ([facility] does not have staffing to accept per care manager)  referral made to [facility].      Electronically signed by VIPIN Patel on 6/27/2024 at 11:39 AM          
Members;Friends/Neighbors;[employer]   Patient's Healthcare Decision Maker is: Legal Next of Kin   PCP Verified by CM Yes   Last Visit to PCP Within last 3 months   Prior Functional Level Independent in ADLs/IADLs   Current Functional Level Assistance with the following:;Cooking;Housework;Shopping;Mobility   Can patient return to prior living arrangement Yes   Ability to make needs known: Good   Family able to assist with home care needs: Yes   Would you like for me to discuss the discharge plan with any other family members/significant others, and if so, who? 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 No   Potential Assistance Purchasing Medications No   Type of Home Care Services Nursing Services;None   Patient expects to be discharged to: House   Follow Up Appointment: Best Day/Time  Monday AM   One/Two Story Residence One story   History of falls? 0   Services At/After Discharge   Transition of Care Consult (CM Consult) Home Health;Discharge Planning   Services At/After Discharge Nursing services;Home Health   Confirm Follow Up Transport Family   Condition of Participation: Discharge Planning   The Plan for Transition of Care is related to the following treatment goals: To have bowel movement, to go home ASAP

## 2024-06-28 NOTE — PROGRESS NOTES
[facility]  INPATIENT PHYSICAL THERAPY  DAILY NOTE  [facility] ORTHOPEDICS 7K - 7K-18/018-A    Time In: 1053  Time Out: 1116  Timed Code Treatment Minutes: 23 Minutes  Minutes: 23          Date: 2024  Patient Name: Dasha Garcia,  Gender:  female        MRN: [id number]  : 1965  (59 y.o.)     Referring Practitioner: Marlon Orr PA-C  Diagnosis: Spinal stenosis of lumbar region with neurogenic claudication  Additional Pertinent Hx: Per EMR \"The patient is a 59-year-old female who presented to our office with complaints of significant low back pain and bilateral lower extremity radiculopathy with symptoms of heaviness causing the inability to stand and walk more than 15 minutes at a time.  She has a previous history of an L4-5 laminectomy completed at [facility] by Dr. Del Rio in  and is also known to us having been through previous cervical spine surgery including removal of plate C5-7 with ACDF C7-T1.  She has continued to struggle some due to the back pain which has really limited her day-to-day activity.  She has currently been in physical therapy, which has really not shown significant improvement of her back or leg discomfort.  She is struggling to mobilize and has had a substantial decrease in quality of life.  Imaging has demonstrated evidence of multilevel spinal stenosis and she elects to proceed with further operative management.  She has been cleared by her family provider, Dr. Hank To to undergo this operation.\" Pt status post op lumbar laminectomy and fusion 24 .     Prior Level of Function:  Lives With: Spouse  Type of Home: House  Home Layout: One level  Home Access: Stairs to enter without rails  Entrance Stairs - Number of Steps: 2 steps from sidewalk and 1 from porch to enter, and 2 steps in garage with grab bar  Home Equipment: Walker - Rolling, Cane   Bathroom Shower/Tub: Walk-in shower  Bathroom Toilet: Handicap height  Bathroom Equipment: Built-in  shower seat, Grab bars around toilet  Bathroom Accessibility: Accessible    Receives Help From: Family  ADL Assistance: Independent  Homemaking Assistance: Independent  Homemaking Responsibilities: Yes  Ambulation Assistance: Independent  Transfer Assistance: Independent  Active : Yes  Additional Comments: Pt reports 3 falls in the past year. Pt and  share homemaking.  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 Pt does have cat that can get in the way. No AD used prior.    Restrictions/Precautions:  Restrictions/Precautions: Surgical Protocols, General Precautions, Fall Risk  Required Braces or Orthoses  Spinal: Lumbar Corset  Position Activity Restriction  Spinal Precautions: No Bending, No Lifting, No Twisting  Other position/activity restrictions: Drains X2     SUBJECTIVE: OK to see pt per nursing. Pt in chair upon arrival and  present, pt cooperative and agreeable with therapy. Pt concerned about getting off low surfaces at home, pt and  educated on gait belt use at home.     PAIN: 3/10: In low back    Vitals: Vitals not assessed per clinical judgement, see nursing flowsheet    OBJECTIVE:  Bed Mobility:  Not Tested    Transfers:  Sit to Stand: Stand By Assistance, with increased time for completion  Stand to Sit:Stand By Assistance, with increased time for completion  Safe sequencing displayed from different heights and surfaces with RW. Completed scooting in chair with SBA.     Ambulation:  Contact Guard Assistance, with increased time for completion  Distance: 150 + 10 + 10  Surface: Level Tile  Device: Rolling Walker  Gait Deviations:  Forward Flexed Posture, Slow Marivel, Decreased Step Length Bilaterally, Decreased Gait Speed, and Increased reliance on assistive device    Stairs:  Contact Guard Assistance, with increased time for completion  Number of Steps: 4  Height: 6\" step with Bilateral Handrails  Pt displayed safe sequencing with step

## 2024-06-28 NOTE — PROGRESS NOTES
Shanon Edward rounded on a different patient and wanted me to relay the plan to the nurse and care team.     Pt may discharge if HHC is set up and they have had a BM.    Perfect serve her after the BM for discharge orders. Thank you!!

## 2024-06-29 ENCOUNTER — APPOINTMENT (OUTPATIENT)
Dept: CT IMAGING | Age: 59
End: 2024-06-29
Payer: COMMERCIAL

## 2024-06-29 ENCOUNTER — HOSPITAL ENCOUNTER (EMERGENCY)
Age: 59
Discharge: HOME | End: 2024-06-29
Attending: FAMILY MEDICINE
Payer: COMMERCIAL

## 2024-06-29 VITALS
HEIGHT: 68 IN | WEIGHT: 293 LBS | TEMPERATURE: 98.4 F | OXYGEN SATURATION: 98 % | SYSTOLIC BLOOD PRESSURE: 140 MMHG | RESPIRATION RATE: 16 BRPM | HEART RATE: 80 BPM | DIASTOLIC BLOOD PRESSURE: 82 MMHG | BODY MASS INDEX: 44.41 KG/M2

## 2024-06-29 VITALS
SYSTOLIC BLOOD PRESSURE: 134 MMHG | RESPIRATION RATE: 18 BRPM | WEIGHT: 293 LBS | TEMPERATURE: 98.1 F | BODY MASS INDEX: 44.41 KG/M2 | DIASTOLIC BLOOD PRESSURE: 68 MMHG | HEIGHT: 68 IN | OXYGEN SATURATION: 97 % | HEART RATE: 78 BPM

## 2024-06-29 DIAGNOSIS — G43.009 MIGRAINE WITHOUT AURA AND WITHOUT STATUS MIGRAINOSUS, NOT INTRACTABLE: Primary | ICD-10-CM

## 2024-06-29 LAB
ALBUMIN SERPL BCP-MCNC: 2.7 GM/DL (ref 3.4–5)
ALP SERPL-CCNC: 62 U/L (ref 46–116)
ALT SERPL W P-5'-P-CCNC: 22 U/L (ref 14–63)
ANION GAP SERPL CALC-SCNC: 9 MEQ/L (ref 8–16)
AST SERPL W P-5'-P-CCNC: 16 U/L (ref 15–37)
BASOPHILS # BLD: 0.7 % (ref 0–3)
BASOPHILS ABSOLUTE: 0 THOU/MM3 (ref 0–0.1)
BASOPHILS ABSOLUTE: 0.1 THOU/MM3 (ref 0–0.1)
BASOPHILS NFR BLD AUTO: 0.4 %
BILIRUB SERPL-MCNC: 0.4 MG/DL (ref 0.2–1)
BUN SERPL-MCNC: 11 MG/DL (ref 7–18)
CALCIUM SERPL-MCNC: 8.9 MG/DL (ref 8.5–10.1)
CHLORIDE SERPL-SCNC: 104 MEQ/L (ref 98–107)
CO2 SERPL-SCNC: 29 MEQ/L (ref 21–32)
CREAT SERPL-MCNC: 0.7 MG/DL (ref 0.6–1.3)
DEPRECATED RDW RBC AUTO: 47 FL (ref 35–45)
EOSINOPHIL NFR BLD AUTO: 1.6 %
EOSINOPHILS ABSOLUTE: 0.1 THOU/MM3 (ref 0–0.4)
EOSINOPHILS ABSOLUTE: 0.3 THOU/MM3 (ref 0–0.5)
EOSINOPHILS RELATIVE PERCENT: 3.8 % (ref 0–4)
ERYTHROCYTE [DISTWIDTH] IN BLOOD BY AUTOMATED COUNT: 13.8 % (ref 11.5–14.5)
GFR SERPL CREATININE-BSD FRML MDRD: > 90 ML/MIN/1.73M2
GLUCOSE SERPL-MCNC: 148 MG/DL (ref 74–106)
HCT VFR BLD AUTO: 29.3 % (ref 37–47)
HCT VFR BLD CALC: 31.2 % (ref 37–47)
HEMOGLOBIN: 10.2 GM/DL (ref 12–16)
HGB BLD-MCNC: 9.6 GM/DL (ref 12–16)
IMM GRANULOCYTES # BLD AUTO: 0.04 THOU/MM3 (ref 0–0.07)
IMM GRANULOCYTES NFR BLD AUTO: 0.5 %
IMMATURE GRANS (ABS): 0 THOU/MM3 (ref 0–0.07)
IMMATURE GRANULOCYTES %: 0 %
LYMPHOCYTES # BLD AUTO: 23.3 % (ref 15–47)
LYMPHOCYTES ABSOLUTE: 1.7 THOU/MM3 (ref 1–4.8)
LYMPHOCYTES ABSOLUTE: 2.2 THOU/MM3 (ref 1–4.8)
LYMPHOCYTES NFR BLD AUTO: 27.9 %
MCH RBC QN AUTO: 30.4 PG (ref 26–32)
MCH RBC QN AUTO: 30.6 PG (ref 26–33)
MCHC RBC AUTO-ENTMCNC: 32.7 GM/DL (ref 31–35)
MCHC RBC AUTO-ENTMCNC: 32.8 GM/DL (ref 32.2–35.5)
MCV RBC AUTO: 93.1 FL (ref 81–99)
MCV RBC AUTO: 93.3 FL (ref 81–99)
MONOCYTES ABSOLUTE: 0.9 THOU/MM3 (ref 0.4–1.3)
MONOCYTES NFR BLD AUTO: 10.8 %
MONOCYTES: 0.6 THOU/MM3 (ref 0.3–1.3)
MONOCYTES: 7.9 % (ref 0–12)
NEUTROPHILS ABSOLUTE: 4.6 THOU/MM3 (ref 1.8–7.7)
NEUTROPHILS ABSOLUTE: 4.7 THOU/MM3 (ref 1.8–7.7)
NEUTROPHILS NFR BLD AUTO: 58.8 %
NRBC BLD AUTO-RTO: 0 /100 WBC
PDW BLD-RTO: 13.7 % (ref 11.5–14.9)
PLATELET # BLD AUTO: 177 THOU/MM3 (ref 130–400)
PLATELET # BLD AUTO: 188 THOU/MM3 (ref 130–400)
PMV BLD AUTO: 10.3 FL (ref 9.4–12.4)
PMV BLD AUTO: 9.8 FL (ref 9.4–12.4)
POTASSIUM SERPL-SCNC: 3.5 MEQ/L (ref 3.5–5.1)
PROT SERPL-MCNC: 6.7 GM/DL (ref 6.4–8.2)
RBC # BLD AUTO: 3.14 MILL/MM3 (ref 4.2–5.4)
RBC # BLD: 3.35 MILL/MM3 (ref 4.1–5.3)
SEG NEUTROPHILS: 63.9 % (ref 43–75)
SODIUM SERPL-SCNC: 142 MEQ/L (ref 136–145)
WBC # BLD AUTO: 7.9 THOU/MM3 (ref 4.8–10.8)
WBC # BLD: 7.3 THOU/MM3 (ref 4.8–10.8)

## 2024-06-29 PROCEDURE — 85025 COMPLETE CBC W/AUTO DIFF WBC: CPT

## 2024-06-29 PROCEDURE — 99284 EMERGENCY DEPT VISIT MOD MDM: CPT

## 2024-06-29 PROCEDURE — 96374 THER/PROPH/DIAG INJ IV PUSH: CPT

## 2024-06-29 PROCEDURE — 6360000002 HC RX W HCPCS: Performed by: FAMILY MEDICINE

## 2024-06-29 PROCEDURE — 80053 COMPREHEN METABOLIC PANEL: CPT

## 2024-06-29 PROCEDURE — 2580000003 HC RX 258: Performed by: FAMILY MEDICINE

## 2024-06-29 PROCEDURE — 36415 COLL VENOUS BLD VENIPUNCTURE: CPT

## 2024-06-29 PROCEDURE — 70450 CT HEAD/BRAIN W/O DYE: CPT

## 2024-06-29 PROCEDURE — 96361 HYDRATE IV INFUSION ADD-ON: CPT

## 2024-06-29 PROCEDURE — 6370000000 HC RX 637 (ALT 250 FOR IP): Performed by: PHYSICIAN ASSISTANT

## 2024-06-29 PROCEDURE — 96375 TX/PRO/DX INJ NEW DRUG ADDON: CPT

## 2024-06-29 PROCEDURE — 2580000003 HC RX 258: Performed by: PHYSICIAN ASSISTANT

## 2024-06-29 RX ORDER — 0.9 % SODIUM CHLORIDE 0.9 %
500 INTRAVENOUS SOLUTION INTRAVENOUS ONCE
Status: COMPLETED | OUTPATIENT
Start: 2024-06-29 | End: 2024-06-29

## 2024-06-29 RX ORDER — METOCLOPRAMIDE HYDROCHLORIDE 5 MG/ML
10 INJECTION INTRAMUSCULAR; INTRAVENOUS ONCE
Status: COMPLETED | OUTPATIENT
Start: 2024-06-29 | End: 2024-06-29

## 2024-06-29 RX ORDER — DIPHENHYDRAMINE HYDROCHLORIDE 50 MG/ML
25 INJECTION INTRAMUSCULAR; INTRAVENOUS ONCE
Status: COMPLETED | OUTPATIENT
Start: 2024-06-29 | End: 2024-06-29

## 2024-06-29 RX ORDER — KETOROLAC TROMETHAMINE 30 MG/ML
30 INJECTION, SOLUTION INTRAMUSCULAR; INTRAVENOUS ONCE
Status: COMPLETED | OUTPATIENT
Start: 2024-06-29 | End: 2024-06-29

## 2024-06-29 RX ADMIN — DOCUSATE SODIUM 200 MG: 100 CAPSULE, LIQUID FILLED ORAL at 08:11

## 2024-06-29 RX ADMIN — ACETAMINOPHEN 650 MG: 325 TABLET ORAL at 04:26

## 2024-06-29 RX ADMIN — NALOXEGOL OXALATE 12.5 MG: 12.5 TABLET, FILM COATED ORAL at 05:47

## 2024-06-29 RX ADMIN — MAGNESIUM HYDROXIDE 30 ML: 1200 LIQUID ORAL at 08:16

## 2024-06-29 RX ADMIN — DULOXETINE HYDROCHLORIDE 30 MG: 30 CAPSULE, DELAYED RELEASE ORAL at 08:15

## 2024-06-29 RX ADMIN — SODIUM CHLORIDE, PRESERVATIVE FREE 10 ML: 5 INJECTION INTRAVENOUS at 08:16

## 2024-06-29 RX ADMIN — DIPHENHYDRAMINE HYDROCHLORIDE 25 MG: 50 INJECTION INTRAMUSCULAR; INTRAVENOUS at 21:31

## 2024-06-29 RX ADMIN — KETOROLAC TROMETHAMINE 30 MG: 30 INJECTION, SOLUTION INTRAMUSCULAR at 21:32

## 2024-06-29 RX ADMIN — PANTOPRAZOLE SODIUM 40 MG: 40 TABLET, DELAYED RELEASE ORAL at 05:47

## 2024-06-29 RX ADMIN — AMLODIPINE BESYLATE 10 MG: 10 TABLET ORAL at 08:14

## 2024-06-29 RX ADMIN — METOCLOPRAMIDE 10 MG: 5 INJECTION, SOLUTION INTRAMUSCULAR; INTRAVENOUS at 21:34

## 2024-06-29 RX ADMIN — POLYETHYLENE GLYCOL 3350 17 G: 17 POWDER, FOR SOLUTION ORAL at 08:11

## 2024-06-29 RX ADMIN — PROPRANOLOL HYDROCHLORIDE 120 MG: 60 CAPSULE, EXTENDED RELEASE ORAL at 08:14

## 2024-06-29 RX ADMIN — SODIUM CHLORIDE 500 ML: 9 INJECTION, SOLUTION INTRAVENOUS at 21:34

## 2024-06-29 RX ADMIN — LEVOTHYROXINE SODIUM 25 MCG: 0.03 TABLET ORAL at 05:47

## 2024-06-29 RX ADMIN — SENNOSIDES AND DOCUSATE SODIUM 1 TABLET: 50; 8.6 TABLET ORAL at 08:11

## 2024-06-29 ASSESSMENT — ENCOUNTER SYMPTOMS
COUGH: 0
NAUSEA: 1
VOMITING: 1
SHORTNESS OF BREATH: 0

## 2024-06-29 ASSESSMENT — PAIN DESCRIPTION - FREQUENCY
FREQUENCY: CONTINUOUS
FREQUENCY: CONTINUOUS

## 2024-06-29 ASSESSMENT — PAIN DESCRIPTION - ONSET
ONSET: ON-GOING
ONSET: ON-GOING

## 2024-06-29 ASSESSMENT — PAIN DESCRIPTION - PAIN TYPE
TYPE: ACUTE PAIN
TYPE: ACUTE PAIN
TYPE: SURGICAL PAIN

## 2024-06-29 ASSESSMENT — PAIN DESCRIPTION - LOCATION
LOCATION: BACK
LOCATION: HEAD
LOCATION: HEAD

## 2024-06-29 ASSESSMENT — PAIN SCALES - GENERAL
PAINLEVEL_OUTOF10: 1
PAINLEVEL_OUTOF10: 8
PAINLEVEL_OUTOF10: 1

## 2024-06-29 ASSESSMENT — PAIN DESCRIPTION - DESCRIPTORS
DESCRIPTORS: ACHING;DULL
DESCRIPTORS: ACHING;DULL
DESCRIPTORS: DISCOMFORT

## 2024-06-29 ASSESSMENT — PAIN - FUNCTIONAL ASSESSMENT
PAIN_FUNCTIONAL_ASSESSMENT: ACTIVITIES ARE NOT PREVENTED
PAIN_FUNCTIONAL_ASSESSMENT: 0-10

## 2024-06-29 ASSESSMENT — PAIN DESCRIPTION - ORIENTATION: ORIENTATION: LOWER

## 2024-06-29 NOTE — PLAN OF CARE
Problem: Discharge Planning  Goal: Discharge to home or other facility with appropriate resources  Outcome: Progressing  Flowsheets  Taken 6/29/2024 0233 by Hank Pineda RN  Discharge to home or other facility with appropriate resources:   Identify barriers to discharge with patient and caregiver   Arrange for needed discharge resources and transportation as appropriate   Identify discharge learning needs (meds, wound care, etc)   Refer to discharge planning if patient needs post-hospital services based on physician order or complex needs related to functional status, cognitive ability or social support system      Problem: Pain  Goal: Verbalizes/displays adequate comfort level or baseline comfort level  Outcome: Progressing  Flowsheets  Taken 6/29/2024 0233 by Hank Pineda RN  Verbalizes/displays adequate comfort level or baseline comfort level:   Encourage patient to monitor pain and request assistance   Assess pain using appropriate pain scale   Administer analgesics based on type and severity of pain and evaluate response   Implement non-pharmacological measures as appropriate and evaluate response   Consider cultural and social influences on pain and pain management      Problem: ABCDS Injury Assessment  Goal: Absence of physical injury  Outcome: Progressing  Flowsheets (Taken 6/29/2024 0233)  Absence of Physical Injury: Implement safety measures based on patient assessment  Note: Bed in low position  Call light in reach  Bed wheel lock  Teaching to use call light for assistance.       Problem: Safety - Adult  Goal: Free from fall injury  Outcome: Progressing  Flowsheets (Taken 6/29/2024 0233)  Free From Fall Injury: Instruct family/caregiver on patient safety       Problem: Skin/Tissue Integrity  Goal: Absence of new skin breakdown  Description: 1.  Monitor for areas of redness and/or skin breakdown  2.  Assess vascular access sites hourly  3.  Every 4-6 hours minimum:  Change oxygen saturation  probe site  4.  Every 4-6 hours:  If on nasal continuous positive airway pressure, respiratory therapy assess nares and determine need for appliance change or resting period.  Outcome: Progressing  Note: Encouraging good fluid and diet intake.  Encourage hygiene  Increase movement and encourage turns.         Problem: Musculoskeletal - Adult  Goal: Return mobility to safest level of function  Outcome: Progressing  Flowsheets (Taken 6/29/2024 0233)  Return Mobility to Safest Level of Function:   Assess patient stability and activity tolerance for standing, transferring and ambulating with or without assistive devices   Assist with transfers and ambulation using safe patient handling equipment as needed  Note: Encourage movement to the best of patients ability       Problem: Gastrointestinal - Adult  Goal: Maintains or returns to baseline bowel function  Outcome: Progressing  Flowsheets (Taken 6/29/2024 0233)  Maintains or returns to baseline bowel function:   Assess bowel function   Encourage oral fluids to ensure adequate hydration   Administer IV fluids as ordered to ensure adequate hydration   Administer ordered medications as needed   Encourage mobilization and activity

## 2024-06-29 NOTE — DISCHARGE SUMMARY
Orthopedic Spine Discharge Summary      Patient Identification:   Dasha Garcia   : 1965  MRN: [id number]   Account: [id number]      Patient's PCP: Hank To MD    Admit Date: 2024     Discharge Date: ***    Admitting Physician: Alec Wang MD     Discharge Provider: MEME Coronado CNP , Dr. Alec Wang    Discharge Diagnoses:      The patient was seen and examined on day of discharge and this discharge summary is in conjunction with any daily progress note from day of discharge.    Operation Performed:        Hospital Course:    The patient is 59 y.o. female, who presented to our office having symptoms of ***. Due to failed outpatient conservative therapies, *** elected to undergo operative management and underwent surgery at [facility] on *** after which patient was admitted to the 7k floor for continued monitoring and care.    On POD 1, ***. Throughout the hospital stay, strength was maintained 5/5 with resisted knee extension, dorsiflexion, plantar flexion, and great toe extension bilaterally.  Patient advanced bowel function. Ambulation improved with PT. Upon discharge, drains were ***.               Consults:     IP CONSULT TO SOCIAL WORK  IP CONSULT TO HOME CARE NEEDS    Disposition:  ***    Condition at Discharge: Stable        Discharge Medications:   1. Flexeril  2. Percocet        Discharge Instructions:  No heavy lifting, bending, twisting or vigorous activity. Patient is to take frequent walks to stimulate healing of the low back and strengthening of the legs. Discontinue the use of NSAIDS for 6 weeks after drain removal. No driving until seen back in the office. Patient has a follow up with us in the office in about two weeks. At that time, we will evaluate with lumbar spine x-rays and assess clinical recovery progress.        Electronically signed by MEME Coronado CNP on 2024

## 2024-06-29 NOTE — PLAN OF CARE
Problem: Discharge Planning  Goal: Discharge to home or other facility with appropriate resources  6/29/2024 0827 by Miryam Mckinney RN  Outcome: Progressing  Problem: Pain  Goal: Verbalizes/displays adequate comfort level or baseline comfort level  6/29/2024 0827 by Miryam Mckinney RN  Outcome: Progressing  Problem: Gastrointestinal - Adult  Goal: Maintains or returns to baseline bowel function  6/29/2024 0827 by Miryma Mckinney RN  Outcome: Progressing  Maintains or returns to baseline bowel function:   Assess bowel function   Encourage oral fluids to ensure adequate hydration   Administer IV fluids as ordered to ensure adequate hydration   Administer ordered medications as needed   Encourage mobilization and activity     Verbalizes/displays adequate comfort level or baseline comfort level:   Encourage patient to monitor pain and request assistance   Assess pain using appropriate pain scale   Administer analgesics based on type and severity of pain and evaluate response   Implement non-pharmacological measures as appropriate and evaluate response   Consider cultural and social influences on pain and pain management     Discharge to home or other facility with appropriate resources:   Identify barriers to discharge with patient and caregiver   Arrange for needed discharge resources and transportation as appropriate   Identify discharge learning needs (meds, wound care, etc)   Refer to discharge planning if patient needs post-hospital services based on physician order or complex needs related to functional status, cognitive ability or social support system  Problem: ABCDS Injury Assessment  Goal: Absence of physical injury  6/29/2024 0827 by Miryam Mckinney RN  Outcome: Progressing  Note: Pt using call light appropriately to call for assistance with ambulation to the bathroom and to chair. Pt is also compliant with use of non-skid slippers. Pt reports understanding of fall prevention when discussed.      Problem: Safety -  Adult  Goal: Free from fall injury  6/29/2024 0827 by Miryam Mckinney RN  Outcome: Progressing  Problem: Skin/Tissue Integrity  Goal: Absence of new skin breakdown  Description: 1.  Monitor for areas of redness and/or skin breakdown  2.  Assess vascular access sites hourly  3.  Every 4-6 hours minimum:  Change oxygen saturation probe site  4.  Every 4-6 hours:  If on nasal continuous positive airway pressure, respiratory therapy assess nares and determine need for appliance change or resting period.  6/29/2024 0827 by Miryam Mckinney RN  Outcome: Progressing     Problem: Musculoskeletal - Adult  Goal: Return mobility to safest level of function  6/29/2024 0827 by Miryam Mckinney RN  Outcome: Progressing  Problem: Musculoskeletal - Adult  Goal: Return ADL status to a safe level of function  6/29/2024 0827 by Miryam Mckinney RN  Outcome: Progressing  Return ADL Status to a Safe Level of Function:   Administer medication as ordered   Assess activities of daily living deficits and provide assistive devices as needed     Return Mobility to Safest Level of Function:   Assess patient stability and activity tolerance for standing, transferring and ambulating with or without assistive devices   Assist with transfers and ambulation using safe patient handling equipment as needed     Free From Fall Injury: Instruct family/caregiver on patient safety     Flowsheets  Discharge to home or other facility with appropriate resources:   Identify barriers to discharge with patient and caregiver   Arrange for needed discharge resources and transportation as appropriate   Identify discharge learning needs (meds, wound care, etc)   Refer to discharge planning if patient needs post-hospital services based on physician order or complex needs related to functional status, cognitive ability or social support system  Discharge to home or other facility with appropriate resources: Identify barriers to discharge with patient and caregiver   Care plan

## 2024-06-29 NOTE — PROGRESS NOTES
Department of Orthopedic Surgery  Spine Service  Progress Note        Subjective:    Dasha was seen this am sitting up in chair with reports of mild improvement of her radicular sx. She is ready to be up with PT/OT.     6/28/2024  Dasha is seen this am with complaints of incisional pain controlled with oral pain medications. She is ambulating with walker well. She is eating and drinking well. Is passing flatus with no reports of BM. She is ready to go home with  with HH for drain management.     6/29//24  Dasha is seen this am sitting up in chair with well controlled pain. Ambulating with minimal assist. She is passing flatus and is eating and drinking well, no abdominal pain.     Vitals  VITALS:  BP (!) 140/82   Pulse 80   Temp 98.4 °F (36.9 °C) (Oral)   Resp 16   Ht 1.727 m (5' 8\")   Wt 134.1 kg (295 lb 9.6 oz)   SpO2 98%   BMI 44.95 kg/m²   24HR INTAKE/OUTPUT:    Intake/Output Summary (Last 24 hours) at 6/29/2024 0936  Last data filed at 6/29/2024 0423  Gross per 24 hour   Intake 1200 ml   Output 390 ml   Net 810 ml     URINARY CATHETER OUTPUT (Yanes):  [REMOVED] Urinary Catheter 06/26/24 Tfdvv-Khwwfxadqsn-Gfraxu (mL): 200 mL  DRAIN/TUBE OUTPUT:  Closed/Suction Drain Left Back Bulb-Output (ml): 0 ml  Closed/Suction Drain Right Back Bulb-Output (ml): 20 ml      PHYSICAL EXAM:    Orientation:  alert and oriented to person, place and time    Incision:  dressing in place, clean, dry, intact      Lower Extremity Motor :  quadriceps, extensor hallucis longus, dorsiflexion, plantarflexion 5/5 bilaterally  Lower Extremity Sensory:  Intact L1-S1    Flatus:  negative    ABNORMAL EXAM FINDINGS:  none    LABS:    HgB:    Lab Results   Component Value Date/Time    HGB 9.6 06/29/2024 03:30 AM    HGB 12.1 03/18/2012 12:30 PM     CBC with Differential:    Lab Results   Component Value Date/Time    WBC 7.9 06/29/2024 03:30 AM    RBC 3.14 06/29/2024 03:30 AM    HGB 9.6 06/29/2024 03:30 AM    HGB 12.1 03/18/2012  12:30 PM    HCT 29.3 06/29/2024 03:30 AM     06/29/2024 03:30 AM    MCV 93.3 06/29/2024 03:30 AM    MCH 30.6 06/29/2024 03:30 AM    MCHC 32.8 06/29/2024 03:30 AM    NRBC 0 06/29/2024 03:30 AM    MONOPCT 10.8 06/29/2024 03:30 AM    MONOSABS 0.9 06/29/2024 03:30 AM    EOSABS 0.1 06/29/2024 03:30 AM    BASOSABS 0.0 06/29/2024 03:30 AM       ASSESSMENT AND PLAN:    Post operative day 3     1:  Monitor labs and drain output  2:  Activity Level:  Activity as tolerated with PT/OT   3:  Pain Control:  Controlled  4:  Discharge Planning:  Pending clinical condition home with with  for drain management await  for discharge Discharge today with [facility]  5:  Work on

## 2024-06-29 NOTE — PLAN OF CARE
Problem: Discharge Planning  Goal: Discharge to home or other facility with appropriate resources  6/29/2024 1107 by Miryam Mckinney RN  Outcome: Adequate for Discharge     Problem: Pain  Goal: Verbalizes/displays adequate comfort level or baseline comfort level  6/29/2024 1107 by Miryam Mckinney RN  Outcome: Adequate for Discharge     Problem: ABCDS Injury Assessment  Goal: Absence of physical injury  6/29/2024 1107 by Miryam Mckinney RN  Outcome: Adequate for Discharge     Problem: Safety - Adult  Goal: Free from fall injury  6/29/2024 1107 by Miryam Mckinney RN  Outcome: Adequate for Discharge     Problem: Skin/Tissue Integrity  Goal: Absence of new skin breakdown  Description: 1.  Monitor for areas of redness and/or skin breakdown  2.  Assess vascular access sites hourly  3.  Every 4-6 hours minimum:  Change oxygen saturation probe site  4.  Every 4-6 hours:  If on nasal continuous positive airway pressure, respiratory therapy assess nares and determine need for appliance change or resting period.  6/29/2024 1107 by Miryam Mckinney RN  Outcome: Adequate for Discharge     Problem: Musculoskeletal - Adult  Goal: Return mobility to safest level of function  6/29/2024 1107 by Miryam Mckinney RN  Outcome: Adequate for Discharge     Problem: Musculoskeletal - Adult  Goal: Return ADL status to a safe level of function  6/29/2024 1107 by Miryam Mckinney RN  Outcome: Adequate for Discharge     Problem: Gastrointestinal - Adult  Goal: Maintains or returns to baseline bowel function  6/29/2024 1107 by Miryam Mckinney RN  Outcome: Adequate for Discharge   Care plan reviewed with patient .  Patient verbalize understanding of the plan of care and contribute to goal setting.

## 2024-06-29 NOTE — PROGRESS NOTES
Went over discharge instructions with Dasha and her . Went over discharge medications and follow up appointments. Went over dressing care and hemovac's.. Both verbalized understanding of instructions. Home health to see tomorrow at ten AM. Discharged per wheelchair to car.

## 2024-06-29 NOTE — DISCHARGE INSTRUCTIONS
Back Surgery    Activity    No lifting, pushing, or pulling    Up as tolerated at least 3-4 times per day.    Up walking-helps to decrease the risk of blood clots    Wear yunior hose as directed per your physician     No driving until cleared by your physician    Back Brace or abdominal binder    If your physician prescribes a brace/abdominal binder, wear back brace at all times.     May remove when in bed or bathing    Follow all back precautions.    Incision Care    Keep back incision dry and intact. Apply clean dry dressing once a day.     May shower  if  no drainage from your incision-unless otherwise directed per your physician    No tub baths-no soaking of incision-no swimming     No heaving lifting of more than 5 lbs/or as directed per your physician       Diet    Increase Fluid/Water intake, eat foods high in fiber; fruits and vegetables to help to prevent constipation    Examples of foods high in fiber    Vegetables      All vegetables, especially asparagus, bean sprouts, broccoli, [place]  sprouts, cabbage, carrots, cauliflower, celery, corn, greens, green beans,  green  pepper, onions, peas, potatoes (with skin), snow peas, spinach,  squash,  sweet potatoes, tomatoes, zucchini      For maximum fiber intake, eat the peels of fruits and vegetables just be sure  to wash them well first.     Fruits    All fruits, especially apples, berries, grapefruits, mangoes, nectarines,  oranges, peaches, pears, dried fruits (figs, dates, prunes, raisins)      Choose raw fruits and vegetables over juice, cooked, or canned raw fruit  has  more fiber. Dried fruit is also a good source of fiber.       Some of the common symptoms of a surgical site infection are:    Symptoms in the area where the surgery took place:     Redness   Drainage   Pus   Pain   Swelling   Bad smell     Prevention of surgical site infection:    Make sure that your healthcare providers clean their hands before examining you - either with soap and water  control with the medications you've been given       Cough shortness of breath, or chest pain       Joint pain, fatigue, stiffness, rash, or other new symptoms       Numbness, tingling, pain, or weakness, especially in the arms, hands, legs, or feet     Pain, swelling in your feet, legs, or calves       Loss of bladder or bowel function       Pain, burning, urgency, frequency of urination, or persistent blood in the urine    In case of an emergency call 911    Refer to medication education sheets for general information and possible side effects

## 2024-06-30 NOTE — ED PROVIDER NOTES
[facility]  eMERGENCY dEPARTMENT eNCOUnter          CHIEF COMPLAINT       Chief Complaint   Patient presents with    Headache     Since 1800       Nurses Notes reviewed and I agree except as noted in the HPI.      HISTORY OF PRESENT ILLNESS    Dasha Garcia is a 59 y.o. female who presents with headache.  Headache is frontal and back of head. Onset about 3 hours prior to arrival. Nausea with vomiting. Light sensitivity noted by patient. Patient notes took Imitrex x 2 prior to arrival with no improvement in symptoms. Discharged yesterday after having lumbar spinal fusion surgery. No fever,chills.       REVIEW OF SYSTEMS     Review of Systems   Constitutional:  Negative for chills and fever.   Respiratory:  Negative for cough and shortness of breath.    Cardiovascular:  Negative for chest pain and palpitations.   Gastrointestinal:  Positive for nausea and vomiting.   Genitourinary:  Negative for difficulty urinating and dysuria.   Skin:  Negative for pallor and rash.   Neurological:  Positive for headaches.   All other systems reviewed and are negative.        PAST MEDICAL HISTORY    has a past medical history of Arthritis, Hypertension, Rheumatoid arthritis (HCC), and Thyroid disease.    SURGICAL HISTORY      has a past surgical history that includes Rectocele repair; Gastric bypass surgery; Appendectomy; cervical fusion; Lumbar spine surgery; cervical fusion (N/A, 8/30/2023); and lumbar fusion (N/A, 6/26/2024).    CURRENT MEDICATIONS       Discharge Medication List as of 6/29/2024 10:39 PM        CONTINUE these medications which have NOT CHANGED    Details   oxyCODONE (ROXICODONE) 5 MG immediate release tablet Take 1 tablet by mouth every 6 hours as needed for Pain for up to 14 days. Intended supply: 7 days. Take lowest dose possible to manage pain Max Daily Amount: 20 mg, Disp-56 tablet, R-0Normal      !! docusate sodium (COLACE) 100 MG capsule Take 1 capsule by mouth 2 times daily, Disp-60

## 2024-06-30 NOTE — ED NOTES
Pt presents with c/o right sided headache that started suddenly around 1800 this evening, pt needed assist out of vehicle and use of wheelchair to get to room, once in room pt had small emesis x 1, pt states she had a back fusion done on the 26th with Dr. Wang and was discharged from the hospital at noon yesterday and felt good today until her headache came on, pt took two of her previously prescribed Imitrex at home for migraine with no relief,  at the bedside. Back brace on and intact, pt does have EDDIE drain x 2 that are intact.

## 2024-06-30 NOTE — DISCHARGE INSTR - COC
only, NOT a DME order):  {EQUIPMENT:922603406}  Other Treatments: ***    Patient's personal belongings (please select all that are sent with patient):  {CHP DME Belongings:257445171}    RN SIGNATURE:  {Esignature:[id number]}    CASE MANAGEMENT/SOCIAL WORK SECTION    Inpatient Status Date: ***    Readmission Risk Assessment Score:  Readmission Risk              Risk of Unplanned Readmission:  0           Discharging to Facility/ Agency   Name:   Address:  Phone:  Fax:    Dialysis Facility (if applicable)   Name:  Address:  Dialysis Schedule:  Phone:  Fax:    / signature: {Esignature:775968215}    PHYSICIAN SECTION    Prognosis: {Prognosis:4798786314}    Condition at Discharge: { Patient Condition:770233007}    Rehab Potential (if transferring to Rehab): {Prognosis:1096825764}    Recommended Labs or Other Treatments After Discharge: ***    Physician Certification: I certify the above information and transfer of Dasha Garcia  is necessary for the continuing treatment of the diagnosis listed and that she requires {Admit to Appropriate Level of Care:88078} for {GREATER/LESS:032275775} 30 days.     Update Admission H&P: {CHP DME Changes in HandP:052692998}    PHYSICIAN SIGNATURE:  {Esignature:762509613}

## 2024-07-17 ENCOUNTER — APPOINTMENT (OUTPATIENT)
Dept: CT IMAGING | Age: 59
DRG: 862 | End: 2024-07-17
Payer: COMMERCIAL

## 2024-07-17 ENCOUNTER — HOSPITAL ENCOUNTER (INPATIENT)
Age: 59
DRG: 862 | End: 2024-07-17
Attending: INTERNAL MEDICINE | Admitting: ORTHOPAEDIC SURGERY
Payer: COMMERCIAL

## 2024-07-17 DIAGNOSIS — L02.91 ABSCESS: ICD-10-CM

## 2024-07-17 DIAGNOSIS — T81.40XA POSTOPERATIVE INFECTION, UNSPECIFIED TYPE, INITIAL ENCOUNTER: Primary | ICD-10-CM

## 2024-07-17 LAB
ALBUMIN SERPL BCG-MCNC: 3.5 G/DL (ref 3.5–5.1)
ALP SERPL-CCNC: 97 U/L (ref 38–126)
ALT SERPL W/O P-5'-P-CCNC: 7 U/L (ref 11–66)
ANION GAP SERPL CALC-SCNC: 11 MEQ/L (ref 8–16)
AST SERPL-CCNC: 14 U/L (ref 5–40)
BASOPHILS ABSOLUTE: 0 THOU/MM3 (ref 0–0.1)
BASOPHILS NFR BLD AUTO: 0.3 %
BILIRUB SERPL-MCNC: 0.5 MG/DL (ref 0.3–1.2)
BUN SERPL-MCNC: 13 MG/DL (ref 7–22)
CALCIUM SERPL-MCNC: 8.6 MG/DL (ref 8.5–10.5)
CHLORIDE SERPL-SCNC: 103 MEQ/L (ref 98–111)
CO2 SERPL-SCNC: 24 MEQ/L (ref 23–33)
CREAT SERPL-MCNC: 0.6 MG/DL (ref 0.4–1.2)
DEPRECATED RDW RBC AUTO: 45.1 FL (ref 35–45)
EOSINOPHIL NFR BLD AUTO: 0.8 %
EOSINOPHILS ABSOLUTE: 0.1 THOU/MM3 (ref 0–0.4)
ERYTHROCYTE [DISTWIDTH] IN BLOOD BY AUTOMATED COUNT: 13.4 % (ref 11.5–14.5)
GFR SERPL CREATININE-BSD FRML MDRD: > 90 ML/MIN/1.73M2
GLUCOSE SERPL-MCNC: 122 MG/DL (ref 70–108)
HCT VFR BLD AUTO: 36.2 % (ref 37–47)
HGB BLD-MCNC: 11.5 GM/DL (ref 12–16)
IMM GRANULOCYTES # BLD AUTO: 0.06 THOU/MM3 (ref 0–0.07)
IMM GRANULOCYTES NFR BLD AUTO: 0.5 %
LYMPHOCYTES ABSOLUTE: 1.2 THOU/MM3 (ref 1–4.8)
LYMPHOCYTES NFR BLD AUTO: 9.8 %
MCH RBC QN AUTO: 29.3 PG (ref 26–33)
MCHC RBC AUTO-ENTMCNC: 31.8 GM/DL (ref 32.2–35.5)
MCV RBC AUTO: 92.1 FL (ref 81–99)
MONOCYTES ABSOLUTE: 1.3 THOU/MM3 (ref 0.4–1.3)
MONOCYTES NFR BLD AUTO: 10.6 %
NEUTROPHILS ABSOLUTE: 9.2 THOU/MM3 (ref 1.8–7.7)
NEUTROPHILS NFR BLD AUTO: 78 %
NRBC BLD AUTO-RTO: 0 /100 WBC
OSMOLALITY SERPL CALC.SUM OF ELEC: 277.1 MOSMOL/KG (ref 275–300)
PLATELET # BLD AUTO: 309 THOU/MM3 (ref 130–400)
PMV BLD AUTO: 9.6 FL (ref 9.4–12.4)
POTASSIUM SERPL-SCNC: 4 MEQ/L (ref 3.5–5.2)
PROT SERPL-MCNC: 6.4 G/DL (ref 6.1–8)
RBC # BLD AUTO: 3.93 MILL/MM3 (ref 4.2–5.4)
REASON FOR REJECTION: NORMAL
REJECTED TEST: NORMAL
SODIUM SERPL-SCNC: 138 MEQ/L (ref 135–145)
WBC # BLD AUTO: 11.8 THOU/MM3 (ref 4.8–10.8)

## 2024-07-17 PROCEDURE — 36415 COLL VENOUS BLD VENIPUNCTURE: CPT

## 2024-07-17 PROCEDURE — 1200000000 HC SEMI PRIVATE

## 2024-07-17 PROCEDURE — 2580000003 HC RX 258

## 2024-07-17 PROCEDURE — 96375 TX/PRO/DX INJ NEW DRUG ADDON: CPT

## 2024-07-17 PROCEDURE — 96365 THER/PROPH/DIAG IV INF INIT: CPT

## 2024-07-17 PROCEDURE — 72132 CT LUMBAR SPINE W/DYE: CPT

## 2024-07-17 PROCEDURE — 6370000000 HC RX 637 (ALT 250 FOR IP): Performed by: INTERNAL MEDICINE

## 2024-07-17 PROCEDURE — 6360000002 HC RX W HCPCS

## 2024-07-17 PROCEDURE — 2580000003 HC RX 258: Performed by: INTERNAL MEDICINE

## 2024-07-17 PROCEDURE — 99285 EMERGENCY DEPT VISIT HI MDM: CPT

## 2024-07-17 PROCEDURE — 87075 CULTR BACTERIA EXCEPT BLOOD: CPT

## 2024-07-17 PROCEDURE — 6360000002 HC RX W HCPCS: Performed by: INTERNAL MEDICINE

## 2024-07-17 PROCEDURE — 80053 COMPREHEN METABOLIC PANEL: CPT

## 2024-07-17 PROCEDURE — 6370000000 HC RX 637 (ALT 250 FOR IP)

## 2024-07-17 PROCEDURE — 87077 CULTURE AEROBIC IDENTIFY: CPT

## 2024-07-17 PROCEDURE — 87070 CULTURE OTHR SPECIMN AEROBIC: CPT

## 2024-07-17 PROCEDURE — 85025 COMPLETE CBC W/AUTO DIFF WBC: CPT

## 2024-07-17 PROCEDURE — 87040 BLOOD CULTURE FOR BACTERIA: CPT

## 2024-07-17 PROCEDURE — 87147 CULTURE TYPE IMMUNOLOGIC: CPT

## 2024-07-17 PROCEDURE — 6360000004 HC RX CONTRAST MEDICATION: Performed by: INTERNAL MEDICINE

## 2024-07-17 PROCEDURE — 87801 DETECT AGNT MULT DNA AMPLI: CPT

## 2024-07-17 PROCEDURE — 87205 SMEAR GRAM STAIN: CPT

## 2024-07-17 RX ORDER — 0.9 % SODIUM CHLORIDE 0.9 %
500 INTRAVENOUS SOLUTION INTRAVENOUS ONCE
Status: COMPLETED | OUTPATIENT
Start: 2024-07-17 | End: 2024-07-17

## 2024-07-17 RX ORDER — PANTOPRAZOLE SODIUM 40 MG/1
40 TABLET, DELAYED RELEASE ORAL
Status: DISCONTINUED | OUTPATIENT
Start: 2024-07-18 | End: 2024-07-22 | Stop reason: HOSPADM

## 2024-07-17 RX ORDER — POLYETHYLENE GLYCOL 3350 17 G/17G
17 POWDER, FOR SOLUTION ORAL DAILY PRN
Status: DISCONTINUED | OUTPATIENT
Start: 2024-07-17 | End: 2024-07-22 | Stop reason: HOSPADM

## 2024-07-17 RX ORDER — MORPHINE SULFATE 2 MG/ML
2 INJECTION, SOLUTION INTRAMUSCULAR; INTRAVENOUS ONCE
Status: COMPLETED | OUTPATIENT
Start: 2024-07-17 | End: 2024-07-17

## 2024-07-17 RX ORDER — MORPHINE SULFATE 2 MG/ML
2 INJECTION, SOLUTION INTRAMUSCULAR; INTRAVENOUS
Status: DISCONTINUED | OUTPATIENT
Start: 2024-07-17 | End: 2024-07-22 | Stop reason: HOSPADM

## 2024-07-17 RX ORDER — DULOXETIN HYDROCHLORIDE 30 MG/1
30 CAPSULE, DELAYED RELEASE ORAL DAILY
Status: DISCONTINUED | OUTPATIENT
Start: 2024-07-18 | End: 2024-07-22 | Stop reason: HOSPADM

## 2024-07-17 RX ORDER — SODIUM CHLORIDE 0.9 % (FLUSH) 0.9 %
5-40 SYRINGE (ML) INJECTION PRN
Status: DISCONTINUED | OUTPATIENT
Start: 2024-07-17 | End: 2024-07-22 | Stop reason: HOSPADM

## 2024-07-17 RX ORDER — SODIUM CHLORIDE 9 MG/ML
INJECTION, SOLUTION INTRAVENOUS PRN
Status: DISCONTINUED | OUTPATIENT
Start: 2024-07-17 | End: 2024-07-22 | Stop reason: HOSPADM

## 2024-07-17 RX ORDER — ONDANSETRON 4 MG/1
4 TABLET, ORALLY DISINTEGRATING ORAL EVERY 8 HOURS PRN
Status: DISCONTINUED | OUTPATIENT
Start: 2024-07-17 | End: 2024-07-22 | Stop reason: HOSPADM

## 2024-07-17 RX ORDER — MORPHINE SULFATE 4 MG/ML
4 INJECTION, SOLUTION INTRAMUSCULAR; INTRAVENOUS
Status: DISCONTINUED | OUTPATIENT
Start: 2024-07-17 | End: 2024-07-22 | Stop reason: HOSPADM

## 2024-07-17 RX ORDER — LEVOTHYROXINE SODIUM 0.03 MG/1
25 TABLET ORAL DAILY
Status: DISCONTINUED | OUTPATIENT
Start: 2024-07-18 | End: 2024-07-22 | Stop reason: HOSPADM

## 2024-07-17 RX ORDER — SODIUM CHLORIDE, SODIUM LACTATE, POTASSIUM CHLORIDE, CALCIUM CHLORIDE 600; 310; 30; 20 MG/100ML; MG/100ML; MG/100ML; MG/100ML
INJECTION, SOLUTION INTRAVENOUS CONTINUOUS
Status: ACTIVE | OUTPATIENT
Start: 2024-07-17 | End: 2024-07-19

## 2024-07-17 RX ORDER — LANOLIN ALCOHOL/MO/W.PET/CERES
3 CREAM (GRAM) TOPICAL NIGHTLY PRN
Status: DISCONTINUED | OUTPATIENT
Start: 2024-07-17 | End: 2024-07-22 | Stop reason: HOSPADM

## 2024-07-17 RX ORDER — CYCLOBENZAPRINE HCL 10 MG
10 TABLET ORAL 3 TIMES DAILY PRN
Status: DISCONTINUED | OUTPATIENT
Start: 2024-07-17 | End: 2024-07-22 | Stop reason: HOSPADM

## 2024-07-17 RX ORDER — AMLODIPINE BESYLATE 10 MG/1
10 TABLET ORAL DAILY
Status: DISCONTINUED | OUTPATIENT
Start: 2024-07-18 | End: 2024-07-22 | Stop reason: HOSPADM

## 2024-07-17 RX ORDER — ENOXAPARIN SODIUM 100 MG/ML
30 INJECTION SUBCUTANEOUS 2 TIMES DAILY
Status: DISCONTINUED | OUTPATIENT
Start: 2024-07-17 | End: 2024-07-19

## 2024-07-17 RX ORDER — CYCLOBENZAPRINE HCL 10 MG
5 TABLET ORAL ONCE
Status: COMPLETED | OUTPATIENT
Start: 2024-07-17 | End: 2024-07-17

## 2024-07-17 RX ORDER — ONDANSETRON 2 MG/ML
4 INJECTION INTRAMUSCULAR; INTRAVENOUS EVERY 6 HOURS PRN
Status: DISCONTINUED | OUTPATIENT
Start: 2024-07-17 | End: 2024-07-22 | Stop reason: HOSPADM

## 2024-07-17 RX ORDER — ACETAMINOPHEN 325 MG/1
650 TABLET ORAL EVERY 6 HOURS PRN
Status: DISCONTINUED | OUTPATIENT
Start: 2024-07-17 | End: 2024-07-22 | Stop reason: HOSPADM

## 2024-07-17 RX ORDER — SODIUM CHLORIDE 0.9 % (FLUSH) 0.9 %
5-40 SYRINGE (ML) INJECTION EVERY 12 HOURS SCHEDULED
Status: DISCONTINUED | OUTPATIENT
Start: 2024-07-17 | End: 2024-07-22 | Stop reason: HOSPADM

## 2024-07-17 RX ORDER — ACETAMINOPHEN 650 MG/1
650 SUPPOSITORY RECTAL EVERY 6 HOURS PRN
Status: DISCONTINUED | OUTPATIENT
Start: 2024-07-17 | End: 2024-07-22 | Stop reason: HOSPADM

## 2024-07-17 RX ORDER — GABAPENTIN 100 MG/1
100 CAPSULE ORAL NIGHTLY
Status: DISCONTINUED | OUTPATIENT
Start: 2024-07-17 | End: 2024-07-22 | Stop reason: HOSPADM

## 2024-07-17 RX ORDER — PROPRANOLOL HCL 60 MG
120 CAPSULE, EXTENDED RELEASE 24HR ORAL DAILY
Status: DISCONTINUED | OUTPATIENT
Start: 2024-07-18 | End: 2024-07-22 | Stop reason: HOSPADM

## 2024-07-17 RX ORDER — ONDANSETRON 2 MG/ML
4 INJECTION INTRAMUSCULAR; INTRAVENOUS ONCE
Status: COMPLETED | OUTPATIENT
Start: 2024-07-17 | End: 2024-07-17

## 2024-07-17 RX ADMIN — ACETAMINOPHEN 650 MG: 325 TABLET ORAL at 21:44

## 2024-07-17 RX ADMIN — ONDANSETRON 4 MG: 2 INJECTION INTRAMUSCULAR; INTRAVENOUS at 18:23

## 2024-07-17 RX ADMIN — SODIUM CHLORIDE 500 ML: 9 INJECTION, SOLUTION INTRAVENOUS at 18:23

## 2024-07-17 RX ADMIN — SODIUM CHLORIDE, PRESERVATIVE FREE 10 ML: 5 INJECTION INTRAVENOUS at 22:49

## 2024-07-17 RX ADMIN — MORPHINE SULFATE 2 MG: 2 INJECTION, SOLUTION INTRAMUSCULAR; INTRAVENOUS at 18:23

## 2024-07-17 RX ADMIN — VANCOMYCIN HYDROCHLORIDE 1000 MG: 1 INJECTION, POWDER, LYOPHILIZED, FOR SOLUTION INTRAVENOUS at 20:18

## 2024-07-17 RX ADMIN — SODIUM CHLORIDE, POTASSIUM CHLORIDE, SODIUM LACTATE AND CALCIUM CHLORIDE: 600; 310; 30; 20 INJECTION, SOLUTION INTRAVENOUS at 22:49

## 2024-07-17 RX ADMIN — MORPHINE SULFATE 2 MG: 2 INJECTION, SOLUTION INTRAMUSCULAR; INTRAVENOUS at 22:46

## 2024-07-17 RX ADMIN — IOPAMIDOL 80 ML: 755 INJECTION, SOLUTION INTRAVENOUS at 18:46

## 2024-07-17 RX ADMIN — CYCLOBENZAPRINE 5 MG: 10 TABLET, FILM COATED ORAL at 18:23

## 2024-07-17 ASSESSMENT — PAIN DESCRIPTION - LOCATION: LOCATION: BACK

## 2024-07-17 ASSESSMENT — PAIN SCALES - GENERAL: PAINLEVEL_OUTOF10: 6

## 2024-07-17 NOTE — ED PROVIDER NOTES
eMERGENCY dEPARTMENT eNCOUnter      CHIEF COMPLAINT    Chief Complaint   Patient presents with    Post-op Problem       HPI    Dasha Garcia is a 59 y.o. female who presents to the emergency department because of purulent fluid coming out of her surgical wound.  Patient had a back surgery done by Dr. Wang on June 26.  Stitches were removed yesterday.  Patient started having fever up to 101 last night and has a lot of pain in that area.  Patient does not have any weakness tingling numbness.    PAST MEDICAL HISTORY    Past Medical History:   Diagnosis Date    Arthritis     Hypertension     Rheumatoid arthritis (HCC)     Thyroid disease        SURGICAL HISTORY    Past Surgical History:   Procedure Laterality Date    APPENDECTOMY      CERVICAL FUSION      CERVICAL FUSION N/A 8/30/2023    Removal of Plate C5-C7, ACDF C7-T1 performed by Alec Wang MD at [facility] OR    GASTRIC BYPASS SURGERY      verticle sleeve gastrectomy    LUMBAR FUSION N/A 6/26/2024    L2-L5 Zach/ PSF performed by Alec Wang MD at [facility] OR    LUMBAR SPINE SURGERY      decompression    RECTOCELE REPAIR         CURRENT MEDICATIONS    Current Outpatient Rx   Medication Sig Dispense Refill    docusate sodium (COLACE) 100 MG capsule Take 1 capsule by mouth 2 times daily 60 capsule 0    Adalimumab-atto (AMJEVITA) 40 MG/0.4ML SOAJ Inject 40 mg into the skin once a week      mometasone (NASONEX) 50 MCG/ACT nasal spray 2 sprays by Each Nostril route daily      DULoxetine (CYMBALTA) 60 MG extended release capsule Take 30 mg by mouth daily      amLODIPine (NORVASC) 5 MG tablet Take 2 tablets by mouth daily      fexofenadine (ALLEGRA) 180 MG tablet Take 1 tablet by mouth daily      Multiple Vitamins-Minerals (THERAPEUTIC MULTIVITAMIN-MINERALS) tablet Take 1 tablet by mouth daily      Lactobacillus (PROBIOTIC ACIDOPHILUS PO) Take 1 capsule by mouth daily      docusate sodium (COLACE) 100 MG capsule Take 2 capsules by mouth daily      omeprazole    CULTURE, BLOOD 1   CULTURE, ANAEROBIC AND AEROBIC   SPECIMEN REJECTION   ANION GAP   GLOMERULAR FILTRATION RATE, ESTIMATED   OSMOLALITY        RADIOLOGY    CT LUMBAR SPINE W CONTRAST   Final Result   1. Inflammatory changes within the superficial subcutaneous fat at the    incision site, with a low-density peripherally enhancing fluid collection    in the deeper subcutaneous fat and paraspinous musculature within the    operative dissection plane. Findings may represent abscess or seroma.      This document has been electronically signed by: Jim Gutierrez MD on    07/17/2024 07:07 PM      All CTs at this facility use dose modulation techniques and iterative    reconstructions, and/or weight-based dosing   when appropriate to reduce radiation to a low as reasonably achievable.           Consults  Dr. Wang  Hospitalist  ED COURSE & MEDICAL DECISION MAKING    Pertinent Labs & Imaging studies reviewed. (See chart for details)  Dr. Wang she was consulted for the patient's condition.  Patient's blood cultures and wound cultures were taken.  Patient was also started on vancomycin.  CT scan showed possible abscess versus seroma.  White count is slightly elevated at 11,800.  Patient will be admitted by hospitalist and orthopedic surgery on consultation    FINAL IMPRESSION    1. Postoperative infection, unspecified type, initial encounter            Cheng Pelaez MD  07/17/24 6992

## 2024-07-17 NOTE — ED NOTES
Report received from GRIFFIN Monge. Patient resting in bed. Respirations easy and unlabored. No distress noted. Call light within reach. Patient provided with mouth swabs at this time.

## 2024-07-17 NOTE — H&P
History & Physical  Internal Medicine Resident         Patient: Dasha Garcia 59 y.o. female      : 1965  Date of Admission: 2024  Date of Service: Pt seen/examined on 24 and Admitted to Inpatient with expected LOS greater than two midnights due to medical therapy.       ASSESSMENT AND PLAN    ? Abscess vs Seroma of lumbar region  Hx of Lumbar Fusion s/p L2-L5 laminectomy + spinal fusion 24  Lumbar Spinal stenosis  Pt had lumbar procedure done by Dr. Wang on , she was seen in clinic on  and had her stiches removed. Overnight of , pt had fever of 102 F, N/V, and  noted serous drainage. On , home health nurse was concerned of fever and discussed with Dr. Wang to bring pt to ED. Bandage placed on pts, lower back does not have any drainage. However CT AP shows concern of seroma vs abscess near incision site.   CT AP: Postsurgical changes of L2-L5, no abnormal lucency surrounding hardware, hardware intact, alignment is normal.  In the paraspinous soft tissue, adjacent to the incision/dissection plane irregular low-density rim-enhancing fluid collection measuring 3.6 x 1.6 cm maximum axial dimension and approximately 2.5 cm craniocaudal.  On arrival afebrile, normotensive, HR 90-100s,  WBC 11.8. Given Vancomycin 1x dose, 500mL fluid bolus, continue Flexeril + morphine.  Continue IV fluids.   Will continue Vancomycin   PRN pain meds ordered.   PRN anti-emetics  BC x2 and Back Cx sent.   NPO at midnight, possible I/D by [facility].   Procal pending.   MRSA screen.   Chronic:   HTN: On Norvasc, continue.   Hypothyroidism: On synthroid, continue.   GERD: On PPI, continue.   RA: On Amjevita, continue on discharge.   Chronic normocytic anemia: On arrival Hgb 11.5, BL 10.5.  No signs of acute bleeding at this time.  Will monitor with CBC.  Neuropathic Pain 2/2 spinal stenosis: Will start on low dose gabapentin nightly.   Migraines: On propanolol, continue.   Hx of gastric    Resp 16   SpO2 98%     General appearance: No apparent distress, appears stated age.  Eyes: PERRL. Conjunctivae/corneas clear.  HENT: Head normal in appearance. External nares normal.  Oral mucosa moist without lesions.  Hearing grossly intact.   Neck: Supple, with full range of motion. Trachea midline.  No gross JVD appreciated.  Respiratory:  Normal respiratory effort.  CTAB  Cardiovascular: Tachycardic RR with normal S1/S2 no murmurs.  No LE edema.   Abdomen: Soft, non-tender, non-distended with normal bowel sounds.  Musculoskeletal: No joint swelling or tenderness. Normal tone. No abnormal movements.   Skin: Warm and dry.  Wound healing scars noted in lower back, no serous discharge noted on bandages.  Neurologic:  No focal sensory/motor deficits in the upper and lower extremities. Cranial nerves:  grossly non-focal 2-12.     Psychiatric: Alert and oriented, normal insight and thought content.   Capillary Refill: Brisk,< 3 seconds.  Peripheral Pulses: +2 palpable, equal bilaterally.       Medications Prior to Admission:   Prior to Admission Medications   Prescriptions Last Dose Informant Patient Reported? Taking?   Adalimumab-atto (AMJEVITA) 40 MG/0.4ML SOAJ   Yes No   Sig: Inject 40 mg into the skin once a week   DULoxetine (CYMBALTA) 60 MG extended release capsule   Yes No   Sig: Take 30 mg by mouth daily   Lactobacillus (PROBIOTIC ACIDOPHILUS PO)   Yes No   Sig: Take 1 capsule by mouth daily   Multiple Vitamins-Minerals (THERAPEUTIC MULTIVITAMIN-MINERALS) tablet   Yes No   Sig: Take 1 tablet by mouth daily   amLODIPine (NORVASC) 5 MG tablet   Yes No   Sig: Take 2 tablets by mouth daily   docusate sodium (COLACE) 100 MG capsule   Yes No   Sig: Take 2 capsules by mouth daily   docusate sodium (COLACE) 100 MG capsule   No No   Sig: Take 1 capsule by mouth 2 times daily   fexofenadine (ALLEGRA) 180 MG tablet   Yes No   Sig: Take 1 tablet by mouth daily   levothyroxine (SYNTHROID) 25 MCG tablet   Yes No   Sig:

## 2024-07-18 ENCOUNTER — APPOINTMENT (OUTPATIENT)
Dept: MRI IMAGING | Age: 59
DRG: 862 | End: 2024-07-18
Payer: COMMERCIAL

## 2024-07-18 PROBLEM — M06.9 RHEUMATOID ARTHRITIS (HCC): Status: ACTIVE | Noted: 2024-07-18

## 2024-07-18 PROBLEM — I10 HYPERTENSION: Status: ACTIVE | Noted: 2024-07-18

## 2024-07-18 LAB
ACB COMPLEX DNA BLD POS QL NAA+NON-PROBE: NOT DETECTED
ACB COMPLEX DNA BLD POS QL NAA+NON-PROBE: NOT DETECTED
ANION GAP SERPL CALC-SCNC: 13 MEQ/L (ref 8–16)
B FRAGILIS DNA BLD POS QL NAA+NON-PROBE: NOT DETECTED
B FRAGILIS DNA BLD POS QL NAA+NON-PROBE: NOT DETECTED
BLACTX-M ISLT/SPM QL: ABNORMAL
BLACTX-M ISLT/SPM QL: NORMAL
BLAIMP ISLT/SPM QL: ABNORMAL
BLAIMP ISLT/SPM QL: NORMAL
BLAKPC ISLT/SPM QL: ABNORMAL
BLAKPC ISLT/SPM QL: NORMAL
BLAOXA-48-LIKE ISLT/SPM QL: ABNORMAL
BLAOXA-48-LIKE ISLT/SPM QL: NORMAL
BLAVIM ISLT/SPM QL: ABNORMAL
BLAVIM ISLT/SPM QL: NORMAL
BOTTLE TYPE: ABNORMAL
BOTTLE TYPE: NORMAL
BUN SERPL-MCNC: 12 MG/DL (ref 7–22)
C ALBICANS DNA BLD POS QL NAA+NON-PROBE: NOT DETECTED
C ALBICANS DNA BLD POS QL NAA+NON-PROBE: NOT DETECTED
C AURIS DNA BLD POS QL NAA+NON-PROBE: NOT DETECTED
C AURIS DNA BLD POS QL NAA+NON-PROBE: NOT DETECTED
C GATTII+NEOFOR DNA BLD POS QL NAA+N-PRB: NOT DETECTED
C GATTII+NEOFOR DNA BLD POS QL NAA+N-PRB: NOT DETECTED
C GLABRATA DNA BLD POS QL NAA+NON-PROBE: NOT DETECTED
C GLABRATA DNA BLD POS QL NAA+NON-PROBE: NOT DETECTED
C KRUSEI DNA BLD POS QL NAA+NON-PROBE: NOT DETECTED
C KRUSEI DNA BLD POS QL NAA+NON-PROBE: NOT DETECTED
C PARAP DNA BLD POS QL NAA+NON-PROBE: NOT DETECTED
C PARAP DNA BLD POS QL NAA+NON-PROBE: NOT DETECTED
C TROPICLS DNA BLD POS QL NAA+NON-PROBE: NOT DETECTED
C TROPICLS DNA BLD POS QL NAA+NON-PROBE: NOT DETECTED
CALCIUM SERPL-MCNC: 8.5 MG/DL (ref 8.5–10.5)
CHLORIDE SERPL-SCNC: 107 MEQ/L (ref 98–111)
CO2 SERPL-SCNC: 20 MEQ/L (ref 23–33)
COAG NEG STAPH DNA BLD QL NAA+PROBE: DETECTED
COAG NEG STAPH DNA BLD QL NAA+PROBE: NOT DETECTED
COLISTIN RES MCR-1 ISLT/SPM QL: ABNORMAL
COLISTIN RES MCR-1 ISLT/SPM QL: NORMAL
CREAT SERPL-MCNC: 0.5 MG/DL (ref 0.4–1.2)
CRP SERPL-MCNC: 17.44 MG/DL (ref 0–1)
DEPRECATED RDW RBC AUTO: 46.3 FL (ref 35–45)
E CLOAC COMP DNA BLD POS NAA+NON-PROBE: NOT DETECTED
E CLOAC COMP DNA BLD POS NAA+NON-PROBE: NOT DETECTED
E COLI DNA BLD POS QL NAA+NON-PROBE: NOT DETECTED
E COLI DNA BLD POS QL NAA+NON-PROBE: NOT DETECTED
E FAECALIS DNA BLD POS QL NAA+NON-PROBE: NOT DETECTED
E FAECALIS DNA BLD POS QL NAA+NON-PROBE: NOT DETECTED
E FAECIUM DNA BLD POS QL NAA+NON-PROBE: NOT DETECTED
E FAECIUM DNA BLD POS QL NAA+NON-PROBE: NOT DETECTED
ENTEROBACTERALES DNA BLD POS NAA+N-PRB: NOT DETECTED
ENTEROBACTERALES DNA BLD POS NAA+N-PRB: NOT DETECTED
ERYTHROCYTE [DISTWIDTH] IN BLOOD BY AUTOMATED COUNT: 13.4 % (ref 11.5–14.5)
ERYTHROCYTE [SEDIMENTATION RATE] IN BLOOD BY WESTERGREN METHOD: 76 MM/HR (ref 0–20)
GFR SERPL CREATININE-BSD FRML MDRD: > 90 ML/MIN/1.73M2
GLUCOSE SERPL-MCNC: 130 MG/DL (ref 70–108)
GP B STREP DNA SPEC QL NAA+PROBE: NOT DETECTED
HAEM INFLU DNA BLD POS QL NAA+NON-PROBE: NOT DETECTED
HAEM INFLU DNA BLD POS QL NAA+NON-PROBE: NOT DETECTED
HCT VFR BLD AUTO: 30.9 % (ref 37–47)
HGB BLD-MCNC: 9.8 GM/DL (ref 12–16)
K OXYTOCA DNA BLD POS QL NAA+NON-PROBE: NOT DETECTED
KLEBSIELLA SP DNA BLD POS QL NAA+NON-PRB: NOT DETECTED
KLEBSIELLA SP DNA BLD POS QL NAA+NON-PRB: NOT DETECTED
L MONOCYTOG DNA BLD POS QL NAA+NON-PROBE: NOT DETECTED
L MONOCYTOG DNA BLD POS QL NAA+NON-PROBE: NOT DETECTED
MAGNESIUM SERPL-MCNC: 2 MG/DL (ref 1.6–2.4)
MCH RBC QN AUTO: 29.9 PG (ref 26–33)
MCHC RBC AUTO-ENTMCNC: 31.7 GM/DL (ref 32.2–35.5)
MCV RBC AUTO: 94.2 FL (ref 81–99)
MECA ISLT/SPM QL: NORMAL
MECA ISLT/SPM QL: NOT DETECTED
MECA+MECC+MREJ ISLT/SPM QL: ABNORMAL
MECA+MECC+MREJ ISLT/SPM QL: NORMAL
MRSA DNA SPEC QL NAA+PROBE: NEGATIVE
N MEN DNA BLD POS QL NAA+NON-PROBE: NOT DETECTED
N MEN DNA BLD POS QL NAA+NON-PROBE: NOT DETECTED
NDM: ABNORMAL
NDM: NORMAL
OSMOLALITY SERPL CALC.SUM OF ELEC: 280.9 MOSMOL/KG (ref 275–300)
P AERUGINOSA DNA BLD POS NAA+NON-PROBE: NOT DETECTED
P AERUGINOSA DNA BLD POS NAA+NON-PROBE: NOT DETECTED
PHOSPHATE SERPL-MCNC: 3.4 MG/DL (ref 2.4–4.7)
PLATELET # BLD AUTO: 268 THOU/MM3 (ref 130–400)
PMV BLD AUTO: 9.7 FL (ref 9.4–12.4)
POTASSIUM SERPL-SCNC: 3.9 MEQ/L (ref 3.5–5.2)
PROCALCITONIN SERPL IA-MCNC: 0.14 NG/ML (ref 0.01–0.09)
PROTEUS SPP: NOT DETECTED
PROTEUS SPP: NOT DETECTED
RBC # BLD AUTO: 3.28 MILL/MM3 (ref 4.2–5.4)
S AUREUS DNA BLD POS QL NAA+NON-PROBE: NOT DETECTED
S AUREUS DNA BLD POS QL NAA+NON-PROBE: NOT DETECTED
S EPIDERMIDIS DNA BLD POS QL NAA+NON-PRB: NOT DETECTED
S EPIDERMIDIS DNA BLD POS QL NAA+NON-PRB: NOT DETECTED
S LUGDUNENSIS DNA BLD POS QL NAA+NON-PRB: DETECTED
S LUGDUNENSIS DNA BLD POS QL NAA+NON-PRB: NOT DETECTED
S MALTOPHILIA DNA BLD POS QL NAA+NON-PRB: NOT DETECTED
S MALTOPHILIA DNA BLD POS QL NAA+NON-PRB: NOT DETECTED
S MARCESCENS DNA BLD POS NAA+NON-PROBE: NOT DETECTED
S MARCESCENS DNA BLD POS NAA+NON-PROBE: NOT DETECTED
S PYO DNA THROAT QL NAA+PROBE: NOT DETECTED
S PYO DNA THROAT QL NAA+PROBE: NOT DETECTED
SALMONELLA DNA BLD POS QL NAA+NON-PROBE: NOT DETECTED
SALMONELLA DNA BLD POS QL NAA+NON-PROBE: NOT DETECTED
SODIUM SERPL-SCNC: 140 MEQ/L (ref 135–145)
SOURCE OF BLOOD CULTURE: ABNORMAL
SOURCE OF BLOOD CULTURE: NORMAL
STREPTOCOCCUS DNA BLD QL NAA+PROBE: NOT DETECTED
STREPTOCOCCUS DNA BLD QL NAA+PROBE: NOT DETECTED
VANA+VANB ISLT/SPM QL: ABNORMAL
VANA+VANB ISLT/SPM QL: NORMAL
WBC # BLD AUTO: 10.9 THOU/MM3 (ref 4.8–10.8)

## 2024-07-18 PROCEDURE — 6360000004 HC RX CONTRAST MEDICATION: Performed by: PHYSICIAN ASSISTANT

## 2024-07-18 PROCEDURE — 86140 C-REACTIVE PROTEIN: CPT

## 2024-07-18 PROCEDURE — 6370000000 HC RX 637 (ALT 250 FOR IP)

## 2024-07-18 PROCEDURE — 84145 PROCALCITONIN (PCT): CPT

## 2024-07-18 PROCEDURE — 72158 MRI LUMBAR SPINE W/O & W/DYE: CPT

## 2024-07-18 PROCEDURE — 87641 MR-STAPH DNA AMP PROBE: CPT

## 2024-07-18 PROCEDURE — 84100 ASSAY OF PHOSPHORUS: CPT

## 2024-07-18 PROCEDURE — 6360000002 HC RX W HCPCS: Performed by: INTERNAL MEDICINE

## 2024-07-18 PROCEDURE — 85027 COMPLETE CBC AUTOMATED: CPT

## 2024-07-18 PROCEDURE — 80048 BASIC METABOLIC PNL TOTAL CA: CPT

## 2024-07-18 PROCEDURE — 6360000002 HC RX W HCPCS

## 2024-07-18 PROCEDURE — 99232 SBSQ HOSP IP/OBS MODERATE 35: CPT | Performed by: INTERNAL MEDICINE

## 2024-07-18 PROCEDURE — 83735 ASSAY OF MAGNESIUM: CPT

## 2024-07-18 PROCEDURE — A9579 GAD-BASE MR CONTRAST NOS,1ML: HCPCS | Performed by: PHYSICIAN ASSISTANT

## 2024-07-18 PROCEDURE — 1200000000 HC SEMI PRIVATE

## 2024-07-18 PROCEDURE — 36415 COLL VENOUS BLD VENIPUNCTURE: CPT

## 2024-07-18 PROCEDURE — 2580000003 HC RX 258: Performed by: INTERNAL MEDICINE

## 2024-07-18 PROCEDURE — 85651 RBC SED RATE NONAUTOMATED: CPT

## 2024-07-18 PROCEDURE — 2580000003 HC RX 258

## 2024-07-18 RX ADMIN — ACETAMINOPHEN 650 MG: 325 TABLET ORAL at 03:29

## 2024-07-18 RX ADMIN — CYCLOBENZAPRINE 10 MG: 10 TABLET, FILM COATED ORAL at 18:48

## 2024-07-18 RX ADMIN — SODIUM CHLORIDE, POTASSIUM CHLORIDE, SODIUM LACTATE AND CALCIUM CHLORIDE: 600; 310; 30; 20 INJECTION, SOLUTION INTRAVENOUS at 23:14

## 2024-07-18 RX ADMIN — WATER 2000 MG: 1 INJECTION INTRAMUSCULAR; INTRAVENOUS; SUBCUTANEOUS at 19:58

## 2024-07-18 RX ADMIN — ENOXAPARIN SODIUM 30 MG: 100 INJECTION SUBCUTANEOUS at 19:58

## 2024-07-18 RX ADMIN — ACETAMINOPHEN 650 MG: 325 TABLET ORAL at 09:44

## 2024-07-18 RX ADMIN — WATER 2000 MG: 1 INJECTION INTRAMUSCULAR; INTRAVENOUS; SUBCUTANEOUS at 13:07

## 2024-07-18 RX ADMIN — VANCOMYCIN HYDROCHLORIDE 1500 MG: 5 INJECTION, POWDER, LYOPHILIZED, FOR SOLUTION INTRAVENOUS at 11:07

## 2024-07-18 RX ADMIN — PROPRANOLOL HYDROCHLORIDE 120 MG: 60 CAPSULE, EXTENDED RELEASE ORAL at 08:31

## 2024-07-18 RX ADMIN — SODIUM CHLORIDE, POTASSIUM CHLORIDE, SODIUM LACTATE AND CALCIUM CHLORIDE: 600; 310; 30; 20 INJECTION, SOLUTION INTRAVENOUS at 09:57

## 2024-07-18 RX ADMIN — GADOTERIDOL 20 ML: 279.3 INJECTION, SOLUTION INTRAVENOUS at 18:34

## 2024-07-18 RX ADMIN — AMLODIPINE BESYLATE 10 MG: 10 TABLET ORAL at 08:32

## 2024-07-18 RX ADMIN — ACETAMINOPHEN 650 MG: 325 TABLET ORAL at 18:48

## 2024-07-18 RX ADMIN — VANCOMYCIN HYDROCHLORIDE 1500 MG: 500 INJECTION, POWDER, LYOPHILIZED, FOR SOLUTION INTRAVENOUS at 00:23

## 2024-07-18 RX ADMIN — GABAPENTIN 100 MG: 100 CAPSULE ORAL at 19:58

## 2024-07-18 RX ADMIN — MORPHINE SULFATE 2 MG: 2 INJECTION, SOLUTION INTRAMUSCULAR; INTRAVENOUS at 03:29

## 2024-07-18 RX ADMIN — DULOXETINE HYDROCHLORIDE 30 MG: 30 CAPSULE, DELAYED RELEASE ORAL at 08:31

## 2024-07-18 RX ADMIN — PANTOPRAZOLE SODIUM 40 MG: 40 TABLET, DELAYED RELEASE ORAL at 05:14

## 2024-07-18 RX ADMIN — LEVOTHYROXINE SODIUM 25 MCG: 0.03 TABLET ORAL at 05:14

## 2024-07-18 ASSESSMENT — PAIN DESCRIPTION - DESCRIPTORS: DESCRIPTORS: ACHING;DULL

## 2024-07-18 ASSESSMENT — PAIN DESCRIPTION - ORIENTATION: ORIENTATION: MID

## 2024-07-18 ASSESSMENT — PAIN SCALES - GENERAL
PAINLEVEL_OUTOF10: 4
PAINLEVEL_OUTOF10: 3
PAINLEVEL_OUTOF10: 3

## 2024-07-18 ASSESSMENT — ENCOUNTER SYMPTOMS
BACK PAIN: 1
ABDOMINAL DISTENTION: 0
COUGH: 0
SHORTNESS OF BREATH: 0

## 2024-07-18 ASSESSMENT — PAIN DESCRIPTION - FREQUENCY: FREQUENCY: CONTINUOUS

## 2024-07-18 ASSESSMENT — PAIN DESCRIPTION - ONSET: ONSET: ON-GOING

## 2024-07-18 ASSESSMENT — PAIN DESCRIPTION - LOCATION
LOCATION: BACK
LOCATION: HEAD

## 2024-07-18 ASSESSMENT — PAIN DESCRIPTION - PAIN TYPE: TYPE: SURGICAL PAIN

## 2024-07-18 ASSESSMENT — PAIN - FUNCTIONAL ASSESSMENT: PAIN_FUNCTIONAL_ASSESSMENT: ACTIVITIES ARE NOT PREVENTED

## 2024-07-18 NOTE — CARE COORDINATION
DISCHARGE PLANNING EVALUATION  7/18/24, 12:16 PM EDT    Reason for Referral: current HH  Decision Maker: self  Current Services: [facility]  New Services Requested: continued [facility] if still needing wound care  Family/ Social/ Home environment: Dasha lives at home with her spouse. Pt reports she is independent in her personal cares. Pt reports she and  share household responsibilities  Payment Source:BCBS  Transportation at Discharge:   Post-acute (PAC) provider list was provided to patient. Patient was informed of their freedom to choose PAC provider. Discussed and offered to show the patient the relevant PAC Providers quality and resource use measures on Medicare Compare web site via computer based on patient's goals of care and treatment preferences. Questions regarding selection process were answered.-declines would like to continue with [facility]      Teach Back Method used with Dasha regarding care plan and needs  Patient verbalized understanding of the plan of care and contribute to goal setting.       Patient preferences and discharge plan: Pt plans to return home with currently [facility], reports she had wound care. Pt reports pending what they do in surgery tomorrow, may not need the HH, but wants [facility] if she does need it.     Call to [facility], spoke with Jackie, confirms current with RN, they will need new HH orders with wound care instructions at discharge. If pt discharges over the weekend, they will need HH orders and AVS faxed to [phone].    Electronically signed by VIPIN Streeter on 7/18/2024 at 12:16 PM

## 2024-07-18 NOTE — PROGRESS NOTES
[facility]   Pharmacy Pharmacokinetic Monitoring Service - Vancomycin     Dasha Garcia is a 59 y.o. female starting on vancomycin therapy for surgical site infection. Pharmacy consulted by Dr. Rebecca Prieto for monitoring and adjustment.    Target Concentration: Goal AUC/CLAUDIA 400-600 mg*hr/L    Additional Antimicrobials: N/A    Pertinent Laboratory Values:   Wt Readings from Last 1 Encounters:   07/17/24 131.5 kg (290 lb)     Temp Readings from Last 1 Encounters:   07/17/24 (!) 100.5 °F (38.1 °C) (Oral)     Estimated Creatinine Clearance: 148 mL/min (based on SCr of 0.6 mg/dL).  Recent Labs     07/17/24  1800 07/17/24  1842   CREATININE  --  0.6   BUN  --  13   WBC 11.8*  --        Pertinent Cultures:  Culture Date Source Results   7/17/24 Blood x 2 sent   MRSA Nasal Swab: not ordered. Order placed by pharmacy.    Plan:  Dosing recommendations based on Bayesian software  Start vancomycin 1500 mg every 12 hours  Anticipated AUC of 547 and trough concentration of 14.2 at steady state  Renal labs as indicated   Pharmacy will continue to monitor patient and adjust therapy as indicated    Thank you for the consult,  Surjit Pickering RPH  7/17/2024 10:56 PM

## 2024-07-18 NOTE — ED NOTES
ED to inpatient nurses report      Chief Complaint:  Chief Complaint   Patient presents with    Post-op Problem     Present to ED from: home    MOA:     LOC: alert and orientated to name, place, date  Mobility: Requires assistance * 1  Oxygen Baseline: room air    Current needs required: none     Code Status:   Full Code    What abnormal results were found and what did you give/do to treat them? Post op infection- atb pain meds  Any procedures or intervention occur? N/a    Mental Status:  Level of Consciousness: Alert (0)    Psych Assessment:        Vitals:  Patient Vitals for the past 24 hrs:   BP Temp Temp src Pulse Resp SpO2 Weight   07/17/24 2249 (!) 146/76 (!) 100.5 °F (38.1 °C) Oral 99 20 93 % --   07/17/24 2245 -- -- -- -- -- -- 131.5 kg (290 lb)   07/17/24 2126 (!) 143/79 (!) 101.3 °F (38.5 °C) Oral (!) 101 22 96 % --   07/17/24 2056 (!) 146/78 -- -- 95 23 94 % --   07/17/24 2015 132/69 -- -- 96 16 96 % --   07/17/24 1910 135/75 -- -- 97 16 98 % --   07/17/24 1823 (!) 151/86 -- -- 99 16 98 % --   07/17/24 1714 -- 98.2 °F (36.8 °C) Oral 91 20 99 % --   07/17/24 1707 (!) 163/97 -- -- -- -- 96 % --        LDAs:   Peripheral IV 07/17/24 Left Forearm (Active)   Site Assessment Clean, dry & intact 07/17/24 1810   Line Status Blood return noted;Specimen collected;Normal saline locked;Flushed 07/17/24 1810   Dressing Status Clean, dry & intact 07/17/24 1810   Dressing Type Securing device;Transparent 07/17/24 1810       Ambulatory Status:  No data recorded    Diagnosis:  DISPOSITION Admitted 07/17/2024 08:51:47 PM   Final diagnoses:   Postoperative infection, unspecified type, initial encounter        Consults:  PHARMACY TO DOSE VANCOMYCIN     Pain Score:  Pain Assessment  Pain Level: 6  Pain Location: Back    C-SSRS:   Risk of Suicide: No Risk    Sepsis Screening:       Johanny Fall Risk:       Swallow Screening        Preferred Language:   English      ALLERGIES     Amoxicillin and Cefdinir    SURGICAL HISTORY        Past Surgical History:   Procedure Laterality Date    APPENDECTOMY      CERVICAL FUSION      CERVICAL FUSION N/A 8/30/2023    Removal of Plate C5-C7, ACDF C7-T1 performed by Alec Wang MD at [facility] OR    GASTRIC BYPASS SURGERY      verticle sleeve gastrectomy    LUMBAR FUSION N/A 6/26/2024    L2-L5 Zach/ PSF performed by lAec Wang MD at [facility] OR    LUMBAR SPINE SURGERY      decompression    RECTOCELE REPAIR         PAST MEDICAL HISTORY       Past Medical History:   Diagnosis Date    Arthritis     Hypertension     Rheumatoid arthritis (HCC)     Thyroid disease            Electronically signed by Guilherme Parker RN on 7/17/2024 at 11:41 PM

## 2024-07-18 NOTE — PLAN OF CARE
Problem: Discharge Planning  Goal: Discharge to home or other facility with appropriate resources  Outcome: Progressing  Flowsheets (Taken 7/18/2024 0047)  Discharge to home or other facility with appropriate resources:   Identify barriers to discharge with patient and caregiver   Identify discharge learning needs (meds, wound care, etc)   Arrange for needed discharge resources and transportation as appropriate     Problem: Safety - Adult  Goal: Free from fall injury  Outcome: Progressing  Flowsheets (Taken 7/18/2024 0047)  Free From Fall Injury: Instruct family/caregiver on patient safety     Problem: ABCDS Injury Assessment  Goal: Absence of physical injury  Outcome: Progressing  Flowsheets (Taken 7/18/2024 0047)  Absence of Physical Injury: Implement safety measures based on patient assessment     Problem: Skin/Tissue Integrity - Adult  Goal: Incisions, wounds, or drain sites healing without S/S of infection  Outcome: Progressing  Flowsheets (Taken 7/18/2024 0047)  Incisions, Wounds, or Drain Sites Healing Without Sign and Symptoms of Infection:   ADMISSION and DAILY: Assess and document risk factors for pressure ulcer development   TWICE DAILY: Assess and document skin integrity   TWICE DAILY: Assess and document dressing/incision, wound bed, drain sites and surrounding tissue     Problem: Infection - Adult  Goal: Absence of infection at discharge  Outcome: Progressing  Flowsheets (Taken 7/18/2024 0047)  Absence of infection at discharge:   Assess and monitor for signs and symptoms of infection   Monitor all insertion sites i.e., indwelling lines, tubes and drains   Monitor lab/diagnostic results   Monitor endotracheal (as able) and nasal secretions for changes in amount and color

## 2024-07-18 NOTE — CONSULTS
Daily    levothyroxine  25 mcg Oral Daily    pantoprazole  40 mg Oral QAM AC    sodium chloride flush  5-40 mL IntraVENous 2 times per day    [Held by provider] enoxaparin  30 mg SubCUTAneous BID    gabapentin  100 mg Oral Nightly    vancomycin  1,500 mg IntraVENous Q12H     Continuous Infusions:   sodium chloride      lactated ringers IV soln 100 mL/hr at 07/18/24 0957     PRN Meds:sodium chloride flush, sodium chloride, ondansetron **OR** ondansetron, polyethylene glycol, acetaminophen **OR** acetaminophen, melatonin, morphine **OR** morphine, cyclobenzaprine  Allergies:   ALLERGIES:    Amoxicillin and Cefdinir        SOCIAL HISTORY:     TOBACCO:   reports that she has never smoked. She has never used smokeless tobacco.     ETOH:   reports no history of alcohol use.  Patient currently lives with family       FAMILY HISTORY:         Problem Relation Age of Onset    Bleeding Prob Mother         BLOOD CLOTS    Stroke Father     Heart Disease Father         CHF       REVIEW OF SYSTEMS:     Constitutional: no fever, no night sweats, no fatigue, no weight loss.  Head: no head ache , no head injury, no migranes.  Eye: no eye discharge, blurring of vision, no double vision,no eye pain.  Ears: no hearing difficulty, no tinnitus  Mouth/throat: no ulceration, dental caries , dysphagia, no hoarseness and voice change  Respiratory: no cough no chest pain,no shortness of breath,no wheezing  CVS: no palpitation, no chest pain,   GI: no abdominal pain, no nausea , no vomiting, no constipation,no diarrhea.  MIRIAN: no dysuria, frequency and urgency, no hematuria, no kidney stones  Musculoskeletal: + Joint pain, swelling , stiffness,  Endocrine: no polyuria, polydipsia, no cold or heat intolerance  Hematology: no anemia, no easy brusing or bleeding, no hx of clotting disorder  Dermatology: no skin rash, no skin lesions, no pruritis,  Neurological:no headaches,no dizziness, no seizure, no numbness.  Psychiatry: no depression, no  Z98.1    Spinal stenosis of lumbar region with neurogenic claudication M48.062    Abscess L02.91           Impression and Recommendation:   Fever likely related  lumbar wound infection  Drainage from a recent lumbar wound: CT scan shows inflammatory changes with possible abscess.  She had elevated markers of inflammation  Will change vancomycin to Ancef 2 g every 8.  Follow-up final culture report  She may need I and D of the fluid collection.      Discharge Planning (Coordination of out patient care:     Thank you Rebecca Mcmahon MD for allowing me to participate in this patient's care.    Saji Qureshi MD, 7/18/2024 11:33 AM

## 2024-07-18 NOTE — PLAN OF CARE
Problem: Discharge Planning  Goal: Discharge to home or other facility with appropriate resources  7/18/2024 1221 by Carmen Bell LSW  Outcome: Progressing   SW consult received. See SW note 7/18/24.

## 2024-07-18 NOTE — PROGRESS NOTES
6 [facility]--HOSPITALIST GROUP    Hospitalist PROGRESS NOTE dictated by Rebecca Mcmahon MD on 2024    Patient ID: Dasha Garcia is 59 y.o. and [facility]  : 1965 MRN: [id number]    Admit date: 2024  Primary Care Physician: Hank To MD   Patient Care Team:  Hank To MD as PCP - General  Tel: [phone]  PHARMACY TO DOSE VANCOMYCIN  IP CONSULT TO SOCIAL WORK  IP CONSULT TO INFECTIOUS DISEASES  Admitting Physician: No admitting provider for patient encounter.   Code Status:  Full Code    Dasha Garcia is a 59 y.o.  female who presented with Post-op Problem    Admit date: 2024  [facility]    Pt had lumbar procedure done by Dr. Wang on , she was seen in clinic on  and had her stiches removed. Overnight of , pt had fever of 102 F, N/V, and  noted serous drainage. On , home health nurse was concerned of fever and discussed with Dr. Wang to bring pt to ED. Bandage placed on pts, lower back does not have any drainage. However CT AP shows concern of seroma vs abscess near incision site.     CT AP: Postsurgical changes of L2-L5, no abnormal lucency surrounding hardware, hardware intact, alignment is normal.  In the paraspinous soft tissue, adjacent to the incision/dissection plane irregular low-density rim-enhancing fluid collection measuring 3.6 x 1.6 cm maximum axial dimension and approximately 2.5 cm craniocaudal.    On arrival afebrile, normotensive, HR 90-100s,  WBC 11.8. Given Vancomycin 1x dose, 500mL fluid bolus, continue Flexeril + morphine.  -----------    2024: Patient with history of atrial fibrillation status post L2-5 fusion on 2024 presents with lumbar abscess versus seroma.  Has had fevers and chills for the past 2 days.  Apparently has been having some purulent drainage and the operative site region appears quite erythematous.  Presently on vancomycin.  Will consult  past medical history of Arthritis, Hypertension, Rheumatoid arthritis (HCC), and Thyroid disease.    SURGICAL HISTORY      has a past surgical history that includes Rectocele repair; Gastric bypass surgery; Appendectomy; cervical fusion; Lumbar spine surgery; cervical fusion (N/A, 2023); lumbar fusion (N/A, 2024); and  section, classic.    CURRENT MEDICATIONS     ceFAZolin, 2,000 mg, Q8H  amLODIPine, 10 mg, Daily  DULoxetine, 30 mg, Daily  propranolol, 120 mg, Daily  levothyroxine, 25 mcg, Daily  pantoprazole, 40 mg, QAM AC  sodium chloride flush, 5-40 mL, 2 times per day  enoxaparin, 30 mg, BID  gabapentin, 100 mg, Nightly        Continuous Infusions:    sodium chloride      lactated ringers IV soln 100 mL/hr at 24 0957       PRN:  sodium chloride flush, 5-40 mL, PRN  sodium chloride, , PRN  ondansetron, 4 mg, Q8H PRN   Or  ondansetron, 4 mg, Q6H PRN  polyethylene glycol, 17 g, Daily PRN  acetaminophen, 650 mg, Q6H PRN   Or  acetaminophen, 650 mg, Q6H PRN  melatonin, 3 mg, Nightly PRN  morphine, 2 mg, Q2H PRN   Or  morphine, 4 mg, Q2H PRN  cyclobenzaprine, 10 mg, TID PRN          HOME MEDICATIONS     Medications Prior to Admission: docusate sodium (COLACE) 100 MG capsule, Take 1 capsule by mouth 2 times daily  Adalimumab-atto (AMJEVITA) 40 MG/0.4ML SOAJ, Inject 40 mg into the skin once a week  mometasone (NASONEX) 50 MCG/ACT nasal spray, 2 sprays by Each Nostril route daily  DULoxetine (CYMBALTA) 60 MG extended release capsule, Take 30 mg by mouth daily  amLODIPine (NORVASC) 5 MG tablet, Take 2 tablets by mouth daily  fexofenadine (ALLEGRA) 180 MG tablet, Take 1 tablet by mouth daily  Multiple Vitamins-Minerals (THERAPEUTIC MULTIVITAMIN-MINERALS) tablet, Take 1 tablet by mouth daily  Lactobacillus (PROBIOTIC ACIDOPHILUS PO), Take 1 capsule by mouth daily  docusate sodium (COLACE) 100 MG capsule, Take 2 capsules by mouth daily  omeprazole (PRILOSEC) 40 MG delayed release capsule, Take 1  changes from L2-L3 through L5-S1 are noted, similar to 06/26/2024 radiographic exam. In the paraspinous soft tissues, both within the subcutaneous fat adjacent to the incision /dissection plane and within the paraspinous musculature there is an irregular low-density rim enhancing fluid collection measuring 3.6 x 1.6 cm maximum axial dimension and approximately 2.5 cm craniocaudal. Otherwise normal soft tissues.     1. Inflammatory changes within the superficial subcutaneous fat at the incision site, with a low-density peripherally enhancing fluid collection in the deeper subcutaneous fat and paraspinous musculature within the operative dissection plane. Findings may represent abscess or seroma. This document has been electronically signed by: Jim Gutierrez MD on 07/17/2024 07:07 PM All CTs at this facility use dose modulation techniques and iterative reconstructions, and/or weight-based dosing when appropriate to reduce radiation to a low as reasonably achievable.    CT HEAD WO CONTRAST    Result Date: 6/29/2024  EXAM: CT Head Without Intravenous Contrast COMPARISON: No relevant prior studies available. FINDINGS: BRAIN AND EXTRA-AXIAL SPACES: Scattered subcortical and periventricular hypoattenuation, likely in keeping with chronic small vessel ischemic disease. Parenchymal volume loss with compensatory prominence of the ventricles and CSF spaces. No acute territorial infarction, intracranial hemorrhage, midline shift or hydrocephalus. BONES/JOINTS: Unremarkable. No acute fracture. SOFT TISSUES: Unremarkable. SINUSES: Unremarkable as visualized. No acute sinusitis. MASTOID AIR CELLS: Unremarkable as visualized. No mastoid effusion.     1. No acute intracranial abnormality. 2. Additional findings as described. This document has been electronically signed by: Eugene Birmingham MD on 06/29/2024 10:19 PM All CTs at this facility use dose modulation techniques and iterative reconstructions, and/or weight-based dosing when

## 2024-07-18 NOTE — CARE COORDINATION
Case Management Assessment Initial Evaluation    Date/Time of Evaluation: 2024 7:55 AM  Assessment Completed by: Ela Sigala RN    If patient is discharged prior to next notation, then this note serves as note for discharge by case management.    Patient Name: Dasha Garcia                   YOB: 1965  Diagnosis: Abscess [L02.91]  Postoperative infection, unspecified type, initial encounter [T81.40XA]                   Date / Time: 2024  5:03 PM  Location: [facility] [place]     Patient Admission Status: Inpatient   Readmission Risk Low 0-14, Mod 15-19), High > 20: Readmission Risk Score: 13.4    Current PCP: Hank To MD    Additional Case Management Notes: Readmitted through ER with post op issue. Drainage from surgical wound, back surgery per Dr. Wang on  with suture removal day prior to presentation. Developed fever 101 and pain. WBC's 11.8. Blood cultures and wound cultures taken. Dr. Wang consulted and hospitalist following. IVF at 100/hr. IV Vanc q 12/hrs.     Procedures: No.    Imagin24   CT LUMBAR SPINE W CONTRAST   Final Result   1. Inflammatory changes within the superficial subcutaneous fat at the    incision site, with a low-density peripherally enhancing fluid collection    in the deeper subcutaneous fat and paraspinous musculature within the    operative dissection plane. Findings may represent abscess or seroma.           Patient Goals/Plan/Treatment Preferences: Met with pt. She resides with spouse and is current with [facility] for post op wound care. She has a PCP and is insured.  following for continuity of services at discharge.          24 1204   Service Assessment   Patient Orientation Alert and Oriented   Cognition Alert   History Provided By Patient   Primary Caregiver Self   Support Systems Spouse/Significant Other   Patient's Healthcare Decision Maker is: Legal Next of Kin   PCP Verified by CM Yes   Last Visit to PCP Within last 3

## 2024-07-18 NOTE — CONSULTS
Orthopedic Spine Consult  Department of Orthopedic Surgery  Shashi Salgado PA-C  Attending: Dr. Alec Wang      Consults    Chief Complaint: Lumbar wound drainage    HPI:   Dasha is a 59-year-old female who is known to us having undergone previous L2-5 lumbar laminectomy and posterior spinal fusion on the date of 6/26/2024.  She was actually most recently seen in the office on the date of 7/16/2024, at which time sutures were removed in the office at roughly 1 PM.  She reports that later that night she began developing a fever and did feel some chills and general sense of illness as well.  There was some drainage coming from the incisional area.  She was seen by home health care the following day and we were sent a picture of her lumbar spine which did show some increased erythema around the wound edges and some drainage.  We recommended that she come to the emergency department at [facility] for evaluation and likely admission.  She has been admitted to the hospital medicine service.  Imaging and some basic lab work has been taken.  Dasha does report that today she is feeling better with improvement of her ill feelings.  She is currently receiving IV vancomycin.  She describes no significant back pain.  No symptoms of lumbar radicular pain or cauda equina symptoms.  Denies any change in bowel or bladder continence.    Assessment:   1: Lumbar wound drainage    Plan:   Both Dr. Wang and myself have seen Dasha at the bedside.  We have examined her incision which does seem to be well-approximated.  No drainage was currently seen, but the wound was undressed at the time of evaluation.  Our plan moving forward will consist of getting some laboratory workup including CRP, ESR, procalcitonin, and a repeat CBC.  Will also consult infectious disease specialist for evaluation and opinion regarding her situation.  We would like to continue to have 2 times daily dressing changes and for the dressings to be capped to be  radiographic exam. In the paraspinous soft tissues, both within the subcutaneous fat adjacent to the incision /dissection plane and within the paraspinous musculature there is an irregular low-density rim enhancing fluid collection measuring 3.6 x 1.6 cm maximum axial dimension and approximately 2.5 cm craniocaudal. Otherwise normal soft tissues.     1. Inflammatory changes within the superficial subcutaneous fat at the incision site, with a low-density peripherally enhancing fluid collection in the deeper subcutaneous fat and paraspinous musculature within the operative dissection plane. Findings may represent abscess or seroma. This document has been electronically signed by: Jim Gutierrez MD on 07/17/2024 07:07 PM All CTs at this facility use dose modulation techniques and iterative reconstructions, and/or weight-based dosing when appropriate to reduce radiation to a low as reasonably achievable.    CT HEAD WO CONTRAST    Result Date: 6/29/2024  EXAM: CT Head Without Intravenous Contrast COMPARISON: No relevant prior studies available. FINDINGS: BRAIN AND EXTRA-AXIAL SPACES: Scattered subcortical and periventricular hypoattenuation, likely in keeping with chronic small vessel ischemic disease. Parenchymal volume loss with compensatory prominence of the ventricles and CSF spaces. No acute territorial infarction, intracranial hemorrhage, midline shift or hydrocephalus. BONES/JOINTS: Unremarkable. No acute fracture. SOFT TISSUES: Unremarkable. SINUSES: Unremarkable as visualized. No acute sinusitis. MASTOID AIR CELLS: Unremarkable as visualized. No mastoid effusion.     1. No acute intracranial abnormality. 2. Additional findings as described. This document has been electronically signed by: Eugene Birmingham MD on 06/29/2024 10:19 PM All CTs at this facility use dose modulation techniques and iterative reconstructions, and/or weight-based dosing when appropriate to reduce radiation to a low as reasonably  probe position in the soft tissues of the lower back behind the level of L2-3.     Intraoperative appearance of the lumbar spine. **This report has been created using voice recognition software. It may contain minor errors which are inherent in voice recognition technology.** Electronically signed by Dr Shaq Van

## 2024-07-18 NOTE — CARE COORDINATION
07/18/24 1203   Readmission Assessment   Number of Days since last admission? 8-30 days   Previous Disposition Home with Home Health   Who is being Interviewed Patient   What was the patient's/caregiver's perception as to why they think they needed to return back to the hospital? Other (Comment)  (Drainage from wound noted.)   Did you visit your Primary Care Physician after you left the hospital, before you returned this time? No   Why weren't you able to visit your PCP? Other (Comment)  (Saw her specialist.)   Did you see a specialist, such as Cardiac, Pulmonary, Orthopedic Physician, etc. after you left the hospital? Yes   Who advised the patient to return to the hospital? Home Health Staff;Physician's Nurse/Office staff   Does the patient report anything that got in the way of taking their medications? No   In our efforts to provide the best possible care to you and others like you, can you think of anything that we could have done to help you after you left the hospital the first time, so that you might not have needed to return so soon? Other (Comment)  (No. States she felt prepared to go home.)

## 2024-07-18 NOTE — PROGRESS NOTES
Pt admitted to  6A14 per Hospitalist from ED. Complains of abscess.  IV site free of s/s of infection or infiltration.  Instructed in use of call light, tv controls, bed controls and 5 minute rule scripted to pt with understanding verbalized.  Fall and safety brochure discussed with pt.

## 2024-07-18 NOTE — PLAN OF CARE
Problem: Discharge Planning  Goal: Discharge to home or other facility with appropriate resources  7/18/2024 1822 by Shanon Melchor RN  Outcome: Progressing  Flowsheets (Taken 7/18/2024 1822)  Discharge to home or other facility with appropriate resources: Identify barriers to discharge with patient and caregiver     Problem: Skin/Tissue Integrity - Adult  Goal: Skin integrity remains intact  Outcome: Progressing  Flowsheets (Taken 7/18/2024 1822)  Skin Integrity Remains Intact: Monitor for areas of redness and/or skin breakdown     Problem: Infection - Adult  Goal: Absence of infection at discharge  Outcome: Progressing  Flowsheets (Taken 7/18/2024 1822)  Absence of infection at discharge:   Assess and monitor for signs and symptoms of infection   Monitor lab/diagnostic results   Administer medications as ordered     Problem: Pain  Goal: Verbalizes/displays adequate comfort level or baseline comfort level  Outcome: Progressing  Flowsheets (Taken 7/18/2024 1822)  Verbalizes/displays adequate comfort level or baseline comfort level:   Encourage patient to monitor pain and request assistance   Assess pain using appropriate pain scale   Administer analgesics based on type and severity of pain and evaluate response   Implement non-pharmacological measures as appropriate and evaluate response

## 2024-07-18 NOTE — ED NOTES
Patient assisted to restroom and returned to bed. Patient medicated per MAR. Provided with boxed lunch. Patient resting in bed. Respirations easy and unlabored. No distress noted. Call light within reach.

## 2024-07-19 ENCOUNTER — ANESTHESIA EVENT (OUTPATIENT)
Dept: OPERATING ROOM | Age: 59
End: 2024-07-19
Payer: COMMERCIAL

## 2024-07-19 ENCOUNTER — ANESTHESIA (OUTPATIENT)
Dept: OPERATING ROOM | Age: 59
End: 2024-07-19
Payer: COMMERCIAL

## 2024-07-19 LAB
ANION GAP SERPL CALC-SCNC: 10 MEQ/L (ref 8–16)
BUN SERPL-MCNC: 9 MG/DL (ref 7–22)
CALCIUM SERPL-MCNC: 8.5 MG/DL (ref 8.5–10.5)
CHLORIDE SERPL-SCNC: 106 MEQ/L (ref 98–111)
CO2 SERPL-SCNC: 23 MEQ/L (ref 23–33)
CREAT SERPL-MCNC: 0.5 MG/DL (ref 0.4–1.2)
DEPRECATED RDW RBC AUTO: 45.1 FL (ref 35–45)
ERYTHROCYTE [DISTWIDTH] IN BLOOD BY AUTOMATED COUNT: 13.1 % (ref 11.5–14.5)
GFR SERPL CREATININE-BSD FRML MDRD: > 90 ML/MIN/1.73M2
GLUCOSE SERPL-MCNC: 103 MG/DL (ref 70–108)
HCT VFR BLD AUTO: 30.9 % (ref 37–47)
HGB BLD-MCNC: 9.7 GM/DL (ref 12–16)
MAGNESIUM SERPL-MCNC: 1.9 MG/DL (ref 1.6–2.4)
MCH RBC QN AUTO: 29.4 PG (ref 26–33)
MCHC RBC AUTO-ENTMCNC: 31.4 GM/DL (ref 32.2–35.5)
MCV RBC AUTO: 93.6 FL (ref 81–99)
PHOSPHATE SERPL-MCNC: 3.3 MG/DL (ref 2.4–4.7)
PLATELET # BLD AUTO: 284 THOU/MM3 (ref 130–400)
PMV BLD AUTO: 9.9 FL (ref 9.4–12.4)
POTASSIUM SERPL-SCNC: 3.7 MEQ/L (ref 3.5–5.2)
RBC # BLD AUTO: 3.3 MILL/MM3 (ref 4.2–5.4)
SODIUM SERPL-SCNC: 139 MEQ/L (ref 135–145)
WBC # BLD AUTO: 9.4 THOU/MM3 (ref 4.8–10.8)

## 2024-07-19 PROCEDURE — 87070 CULTURE OTHR SPECIMN AEROBIC: CPT

## 2024-07-19 PROCEDURE — 6360000002 HC RX W HCPCS

## 2024-07-19 PROCEDURE — 2580000003 HC RX 258: Performed by: INTERNAL MEDICINE

## 2024-07-19 PROCEDURE — 84100 ASSAY OF PHOSPHORUS: CPT

## 2024-07-19 PROCEDURE — 7100000000 HC PACU RECOVERY - FIRST 15 MIN: Performed by: ORTHOPAEDIC SURGERY

## 2024-07-19 PROCEDURE — 85027 COMPLETE CBC AUTOMATED: CPT

## 2024-07-19 PROCEDURE — 2500000003 HC RX 250 WO HCPCS: Performed by: NURSE ANESTHETIST, CERTIFIED REGISTERED

## 2024-07-19 PROCEDURE — 3600000003 HC SURGERY LEVEL 3 BASE: Performed by: ORTHOPAEDIC SURGERY

## 2024-07-19 PROCEDURE — 87075 CULTR BACTERIA EXCEPT BLOOD: CPT

## 2024-07-19 PROCEDURE — 0J970ZZ DRAINAGE OF BACK SUBCUTANEOUS TISSUE AND FASCIA, OPEN APPROACH: ICD-10-PCS | Performed by: ORTHOPAEDIC SURGERY

## 2024-07-19 PROCEDURE — 2580000003 HC RX 258

## 2024-07-19 PROCEDURE — 6370000000 HC RX 637 (ALT 250 FOR IP): Performed by: PHYSICIAN ASSISTANT

## 2024-07-19 PROCEDURE — 7100000001 HC PACU RECOVERY - ADDTL 15 MIN: Performed by: ORTHOPAEDIC SURGERY

## 2024-07-19 PROCEDURE — 36415 COLL VENOUS BLD VENIPUNCTURE: CPT

## 2024-07-19 PROCEDURE — 3600000013 HC SURGERY LEVEL 3 ADDTL 15MIN: Performed by: ORTHOPAEDIC SURGERY

## 2024-07-19 PROCEDURE — 3700000001 HC ADD 15 MINUTES (ANESTHESIA): Performed by: ORTHOPAEDIC SURGERY

## 2024-07-19 PROCEDURE — 2720000010 HC SURG SUPPLY STERILE: Performed by: ORTHOPAEDIC SURGERY

## 2024-07-19 PROCEDURE — 2580000003 HC RX 258: Performed by: PHYSICIAN ASSISTANT

## 2024-07-19 PROCEDURE — 6360000002 HC RX W HCPCS: Performed by: NURSE ANESTHETIST, CERTIFIED REGISTERED

## 2024-07-19 PROCEDURE — 6360000002 HC RX W HCPCS: Performed by: PHYSICIAN ASSISTANT

## 2024-07-19 PROCEDURE — 6370000000 HC RX 637 (ALT 250 FOR IP)

## 2024-07-19 PROCEDURE — 99232 SBSQ HOSP IP/OBS MODERATE 35: CPT | Performed by: NURSE PRACTITIONER

## 2024-07-19 PROCEDURE — 6360000002 HC RX W HCPCS: Performed by: INTERNAL MEDICINE

## 2024-07-19 PROCEDURE — 3700000000 HC ANESTHESIA ATTENDED CARE: Performed by: ORTHOPAEDIC SURGERY

## 2024-07-19 PROCEDURE — 87205 SMEAR GRAM STAIN: CPT

## 2024-07-19 PROCEDURE — 83735 ASSAY OF MAGNESIUM: CPT

## 2024-07-19 PROCEDURE — 80048 BASIC METABOLIC PNL TOTAL CA: CPT

## 2024-07-19 PROCEDURE — 2709999900 HC NON-CHARGEABLE SUPPLY: Performed by: ORTHOPAEDIC SURGERY

## 2024-07-19 PROCEDURE — 1200000000 HC SEMI PRIVATE

## 2024-07-19 PROCEDURE — 87147 CULTURE TYPE IMMUNOLOGIC: CPT

## 2024-07-19 PROCEDURE — 6360000002 HC RX W HCPCS: Performed by: ANESTHESIOLOGY

## 2024-07-19 PROCEDURE — 0JB70ZZ EXCISION OF BACK SUBCUTANEOUS TISSUE AND FASCIA, OPEN APPROACH: ICD-10-PCS | Performed by: ORTHOPAEDIC SURGERY

## 2024-07-19 RX ORDER — FENTANYL CITRATE 50 UG/ML
INJECTION, SOLUTION INTRAMUSCULAR; INTRAVENOUS
Status: COMPLETED
Start: 2024-07-19 | End: 2024-07-19

## 2024-07-19 RX ORDER — SENNA AND DOCUSATE SODIUM 50; 8.6 MG/1; MG/1
1 TABLET, FILM COATED ORAL 2 TIMES DAILY
Status: DISCONTINUED | OUTPATIENT
Start: 2024-07-19 | End: 2024-07-22 | Stop reason: HOSPADM

## 2024-07-19 RX ORDER — PHENYLEPHRINE HCL IN 0.9% NACL 1 MG/10 ML
SYRINGE (ML) INTRAVENOUS PRN
Status: DISCONTINUED | OUTPATIENT
Start: 2024-07-19 | End: 2024-07-19 | Stop reason: SDUPTHER

## 2024-07-19 RX ORDER — SODIUM CHLORIDE 9 MG/ML
INJECTION, SOLUTION INTRAVENOUS CONTINUOUS
Status: DISCONTINUED | OUTPATIENT
Start: 2024-07-19 | End: 2024-07-22 | Stop reason: HOSPADM

## 2024-07-19 RX ORDER — BISACODYL 10 MG
10 SUPPOSITORY, RECTAL RECTAL DAILY PRN
Status: DISCONTINUED | OUTPATIENT
Start: 2024-07-19 | End: 2024-07-22 | Stop reason: HOSPADM

## 2024-07-19 RX ORDER — SODIUM CHLORIDE 0.9 % (FLUSH) 0.9 %
5-40 SYRINGE (ML) INJECTION PRN
Status: DISCONTINUED | OUTPATIENT
Start: 2024-07-19 | End: 2024-07-22 | Stop reason: HOSPADM

## 2024-07-19 RX ORDER — FENTANYL CITRATE 50 UG/ML
50 INJECTION, SOLUTION INTRAMUSCULAR; INTRAVENOUS EVERY 5 MIN PRN
Status: DISCONTINUED | OUTPATIENT
Start: 2024-07-19 | End: 2024-07-19 | Stop reason: HOSPADM

## 2024-07-19 RX ORDER — VANCOMYCIN HYDROCHLORIDE 1 G/20ML
INJECTION, POWDER, LYOPHILIZED, FOR SOLUTION INTRAVENOUS PRN
Status: DISCONTINUED | OUTPATIENT
Start: 2024-07-19 | End: 2024-07-19 | Stop reason: SDUPTHER

## 2024-07-19 RX ORDER — POLYETHYLENE GLYCOL 3350 17 G/17G
17 POWDER, FOR SOLUTION ORAL DAILY
Status: DISCONTINUED | OUTPATIENT
Start: 2024-07-19 | End: 2024-07-22 | Stop reason: HOSPADM

## 2024-07-19 RX ORDER — ROCURONIUM BROMIDE 10 MG/ML
INJECTION, SOLUTION INTRAVENOUS PRN
Status: DISCONTINUED | OUTPATIENT
Start: 2024-07-19 | End: 2024-07-19 | Stop reason: SDUPTHER

## 2024-07-19 RX ORDER — OXYCODONE HYDROCHLORIDE AND ACETAMINOPHEN 5; 325 MG/1; MG/1
1 TABLET ORAL EVERY 4 HOURS PRN
Status: DISCONTINUED | OUTPATIENT
Start: 2024-07-19 | End: 2024-07-22 | Stop reason: HOSPADM

## 2024-07-19 RX ORDER — BISACODYL 5 MG/1
5 TABLET, DELAYED RELEASE ORAL DAILY
Status: DISCONTINUED | OUTPATIENT
Start: 2024-07-19 | End: 2024-07-22 | Stop reason: HOSPADM

## 2024-07-19 RX ORDER — LIDOCAINE HYDROCHLORIDE 20 MG/ML
INJECTION, SOLUTION INTRAVENOUS PRN
Status: DISCONTINUED | OUTPATIENT
Start: 2024-07-19 | End: 2024-07-19 | Stop reason: SDUPTHER

## 2024-07-19 RX ORDER — SODIUM CHLORIDE 9 MG/ML
INJECTION, SOLUTION INTRAVENOUS PRN
Status: DISCONTINUED | OUTPATIENT
Start: 2024-07-19 | End: 2024-07-22 | Stop reason: HOSPADM

## 2024-07-19 RX ORDER — SODIUM CHLORIDE 0.9 % (FLUSH) 0.9 %
5-40 SYRINGE (ML) INJECTION EVERY 12 HOURS SCHEDULED
Status: DISCONTINUED | OUTPATIENT
Start: 2024-07-19 | End: 2024-07-22 | Stop reason: HOSPADM

## 2024-07-19 RX ORDER — ONDANSETRON 2 MG/ML
INJECTION INTRAMUSCULAR; INTRAVENOUS PRN
Status: DISCONTINUED | OUTPATIENT
Start: 2024-07-19 | End: 2024-07-19 | Stop reason: SDUPTHER

## 2024-07-19 RX ORDER — DEXAMETHASONE SODIUM PHOSPHATE 10 MG/ML
INJECTION, EMULSION INTRAMUSCULAR; INTRAVENOUS PRN
Status: DISCONTINUED | OUTPATIENT
Start: 2024-07-19 | End: 2024-07-19 | Stop reason: SDUPTHER

## 2024-07-19 RX ORDER — PROPOFOL 10 MG/ML
INJECTION, EMULSION INTRAVENOUS PRN
Status: DISCONTINUED | OUTPATIENT
Start: 2024-07-19 | End: 2024-07-19 | Stop reason: SDUPTHER

## 2024-07-19 RX ORDER — OXYCODONE HYDROCHLORIDE AND ACETAMINOPHEN 5; 325 MG/1; MG/1
2 TABLET ORAL EVERY 4 HOURS PRN
Status: DISCONTINUED | OUTPATIENT
Start: 2024-07-19 | End: 2024-07-22 | Stop reason: HOSPADM

## 2024-07-19 RX ORDER — FENTANYL CITRATE 50 UG/ML
INJECTION, SOLUTION INTRAMUSCULAR; INTRAVENOUS PRN
Status: DISCONTINUED | OUTPATIENT
Start: 2024-07-19 | End: 2024-07-19 | Stop reason: SDUPTHER

## 2024-07-19 RX ADMIN — SODIUM CHLORIDE, POTASSIUM CHLORIDE, SODIUM LACTATE AND CALCIUM CHLORIDE: 600; 310; 30; 20 INJECTION, SOLUTION INTRAVENOUS at 16:16

## 2024-07-19 RX ADMIN — HYDROMORPHONE HYDROCHLORIDE 0.5 MG: 1 INJECTION, SOLUTION INTRAMUSCULAR; INTRAVENOUS; SUBCUTANEOUS at 17:01

## 2024-07-19 RX ADMIN — ACETAMINOPHEN 650 MG: 325 TABLET ORAL at 03:37

## 2024-07-19 RX ADMIN — GABAPENTIN 100 MG: 100 CAPSULE ORAL at 20:22

## 2024-07-19 RX ADMIN — ONDANSETRON 4 MG: 2 INJECTION INTRAMUSCULAR; INTRAVENOUS at 16:15

## 2024-07-19 RX ADMIN — SODIUM CHLORIDE: 9 INJECTION, SOLUTION INTRAVENOUS at 20:25

## 2024-07-19 RX ADMIN — SODIUM CHLORIDE, POTASSIUM CHLORIDE, SODIUM LACTATE AND CALCIUM CHLORIDE: 600; 310; 30; 20 INJECTION, SOLUTION INTRAVENOUS at 09:35

## 2024-07-19 RX ADMIN — WATER 2000 MG: 1 INJECTION INTRAMUSCULAR; INTRAVENOUS; SUBCUTANEOUS at 03:37

## 2024-07-19 RX ADMIN — DULOXETINE HYDROCHLORIDE 30 MG: 30 CAPSULE, DELAYED RELEASE ORAL at 08:11

## 2024-07-19 RX ADMIN — MORPHINE SULFATE 2 MG: 2 INJECTION, SOLUTION INTRAMUSCULAR; INTRAVENOUS at 21:41

## 2024-07-19 RX ADMIN — HYDROMORPHONE HYDROCHLORIDE 0.5 MG: 1 INJECTION, SOLUTION INTRAMUSCULAR; INTRAVENOUS; SUBCUTANEOUS at 16:56

## 2024-07-19 RX ADMIN — AMLODIPINE BESYLATE 10 MG: 10 TABLET ORAL at 08:11

## 2024-07-19 RX ADMIN — ROCURONIUM BROMIDE 50 MG: 10 INJECTION INTRAVENOUS at 15:17

## 2024-07-19 RX ADMIN — FENTANYL CITRATE 50 MCG: 50 INJECTION, SOLUTION INTRAMUSCULAR; INTRAVENOUS at 16:41

## 2024-07-19 RX ADMIN — HYDROMORPHONE HYDROCHLORIDE 0.5 MG: 1 INJECTION, SOLUTION INTRAMUSCULAR; INTRAVENOUS; SUBCUTANEOUS at 17:06

## 2024-07-19 RX ADMIN — WATER 2000 MG: 1 INJECTION INTRAMUSCULAR; INTRAVENOUS; SUBCUTANEOUS at 11:59

## 2024-07-19 RX ADMIN — SENNOSIDES AND DOCUSATE SODIUM 1 TABLET: 50; 8.6 TABLET ORAL at 20:22

## 2024-07-19 RX ADMIN — Medication 3 MG: at 22:59

## 2024-07-19 RX ADMIN — Medication 100 MCG: at 16:08

## 2024-07-19 RX ADMIN — FENTANYL CITRATE 100 MCG: 50 INJECTION, SOLUTION INTRAMUSCULAR; INTRAVENOUS at 15:13

## 2024-07-19 RX ADMIN — PROPRANOLOL HYDROCHLORIDE 60 MG: 60 CAPSULE, EXTENDED RELEASE ORAL at 08:12

## 2024-07-19 RX ADMIN — SUGAMMADEX 200 MG: 100 INJECTION, SOLUTION INTRAVENOUS at 16:28

## 2024-07-19 RX ADMIN — HYDROMORPHONE HYDROCHLORIDE 0.5 MG: 1 INJECTION, SOLUTION INTRAMUSCULAR; INTRAVENOUS; SUBCUTANEOUS at 16:51

## 2024-07-19 RX ADMIN — LEVOTHYROXINE SODIUM 25 MCG: 0.03 TABLET ORAL at 05:58

## 2024-07-19 RX ADMIN — PANTOPRAZOLE SODIUM 40 MG: 40 TABLET, DELAYED RELEASE ORAL at 05:58

## 2024-07-19 RX ADMIN — LIDOCAINE HYDROCHLORIDE 100 MG: 20 INJECTION INTRAVENOUS at 15:17

## 2024-07-19 RX ADMIN — VANCOMYCIN HYDROCHLORIDE 1000 MG: 1 INJECTION, POWDER, LYOPHILIZED, FOR SOLUTION INTRAVENOUS at 16:00

## 2024-07-19 RX ADMIN — FENTANYL CITRATE 50 MCG: 50 INJECTION, SOLUTION INTRAMUSCULAR; INTRAVENOUS at 16:46

## 2024-07-19 RX ADMIN — WATER 2000 MG: 1 INJECTION INTRAMUSCULAR; INTRAVENOUS; SUBCUTANEOUS at 20:23

## 2024-07-19 RX ADMIN — Medication 100 MCG: at 16:05

## 2024-07-19 RX ADMIN — PROPRANOLOL HYDROCHLORIDE 60 MG: 60 CAPSULE, EXTENDED RELEASE ORAL at 08:56

## 2024-07-19 RX ADMIN — PROPOFOL 150 MG: 10 INJECTION, EMULSION INTRAVENOUS at 15:17

## 2024-07-19 RX ADMIN — DEXAMETHASONE SODIUM PHOSPHATE 10 MG: 10 INJECTION, EMULSION INTRAMUSCULAR; INTRAVENOUS at 15:37

## 2024-07-19 ASSESSMENT — PAIN - FUNCTIONAL ASSESSMENT
PAIN_FUNCTIONAL_ASSESSMENT: ACTIVITIES ARE NOT PREVENTED
PAIN_FUNCTIONAL_ASSESSMENT: 0-10
PAIN_FUNCTIONAL_ASSESSMENT: ACTIVITIES ARE NOT PREVENTED

## 2024-07-19 ASSESSMENT — PAIN DESCRIPTION - ORIENTATION
ORIENTATION: MID
ORIENTATION: MID

## 2024-07-19 ASSESSMENT — PAIN SCALES - WONG BAKER
WONGBAKER_NUMERICALRESPONSE: NO HURT

## 2024-07-19 ASSESSMENT — PAIN SCALES - GENERAL
PAINLEVEL_OUTOF10: 4
PAINLEVEL_OUTOF10: 7
PAINLEVEL_OUTOF10: 7
PAINLEVEL_OUTOF10: 4
PAINLEVEL_OUTOF10: 7
PAINLEVEL_OUTOF10: 7
PAINLEVEL_OUTOF10: 2
PAINLEVEL_OUTOF10: 4
PAINLEVEL_OUTOF10: 4
PAINLEVEL_OUTOF10: 7
PAINLEVEL_OUTOF10: 5
PAINLEVEL_OUTOF10: 3
PAINLEVEL_OUTOF10: 3
PAINLEVEL_OUTOF10: 7
PAINLEVEL_OUTOF10: 6

## 2024-07-19 ASSESSMENT — PAIN DESCRIPTION - LOCATION
LOCATION: BACK
LOCATION: HEAD
LOCATION: HEAD
LOCATION: BACK

## 2024-07-19 ASSESSMENT — PAIN DESCRIPTION - DESCRIPTORS
DESCRIPTORS: ACHING
DESCRIPTORS: ACHING
DESCRIPTORS: SORE
DESCRIPTORS: ACHING

## 2024-07-19 NOTE — PROGRESS NOTES
Physician Progress Note      PATIENT:               JARRELL DUONG  [facility] #:                  [id number]  :                       1965  ADMIT DATE:       2024 5:03 PM  DISCH DATE:  RESPONDING  PROVIDER #:        Ivette Thomas CNP          QUERY TEXT:    Pt admitted with post-op wound infection. Pt noted to have Temp up to 101.3,   HR up to 101, RR up to 23 with elevated CRP and PCT and positive blood   cultures. If possible, please document in the progress notes and discharge   summary if you are evaluating and /or treating any of the following:    The medical record reflects the following:  Risk Factors: lumbar surgery, purulent drainage, wound infection, blood   cultures +  Clinical Indicators: on arrival WBC 11.8, PCT 0.14, CRP 17.44, temp up to   101.3, HR , RR 16-23 with + BC x2 for gram + cocci in clusters; wound   culture + staphylococcus  Treatment: Labs, imaging, monitoring, Ancef, ID consult  Options provided:  -- Staphylococcus sepsis, present on admission  -- Staphylococcus epsis, present on admission, now resolved  -- Sepsis was ruled out  -- Other - I will add my own diagnosis  -- Disagree - Not applicable / Not valid  -- Disagree - Clinically unable to determine / Unknown  -- Refer to Clinical Documentation Reviewer    PROVIDER RESPONSE TEXT:    possible abscess lumbar region    Query created by: Jennyfer Boyd on 2024 11:19 AM      QUERY TEXT:    Pt admitted with post operative wound infection and underwent back surgery on    with hardware placed.? If possible, please document in progress notes and   discharge summary the relationship if any between the *** and the surgery:  ?  The medical record reflects the following:  Risk Factors: wound infection, hardware in place, post op from   Clinical Indicators: presented with post operative wound infection, noted to   have lumbar surgery with fusion, so hardware is in place; wound culture +

## 2024-07-19 NOTE — PROGRESS NOTES
Hospitalist Progress Note    Patient:  Dasha Garcia      Unit/Bed:6A-14/014-A    YOB: 1965    MRN: [id number]       Acct: [id number]     PCP: Hank To MD    Date of Admission: 7/17/2024    Assessment/Plan:    Suspected Abscess vs Seroma of lumbar region  Shown on CT lumbar spine done on 7/17/24 that showed:Inflammatory changes within the superficial subcutaneous fat at the incision site, with a low-density peripherally enhancing fluid collection in the deeper subcutaneous fat and paraspinous musculature within the operative dissection plane. Findings may represent abscess or seroma.  Patient with history of lumbar spinal stenosis, lumbar Fusion s/p L2-L5 laminectomy/spinal fusion on 6/27/24  Initial blood culture showed: Staphylococcus  lugdunensis repeat blood culture preliminary report no growth.Discussed with ID Dr Qureshi  indicated  staph  lugdunensis is known to cause serous infections,will continue to follow cultures  ID managing IV antibiotics appreciate input  Patient is for I&D  today per orthopedic team   Continue analgesics prn    2. Hypothyroidism  Stable  Continue Levothyroxine    3.Essential hypertension  Continue Amlodipine     4.GERD  Stable  Continue Protonix     5.RA  On Amjevita, continue on discharge     6.Obesity 44.57  Encourage health food choices    Anticipated Discharge in : TBD    Active Hospital Problems    Diagnosis Date Noted    Rheumatoid arthritis (HCC) [M06.9] 07/18/2024    Hypertension [I10] 07/18/2024    Abscess [L02.91] 07/17/2024           Chief Complaint:  Fever     Hospital Course: History Of Present Illness:  Dasha Garcia is a 59 y.o. female with PMHx of obesity, HTN, hypothyroidism, GERD, RA, migraine, Hx of gastric bypass, anxiety/depression, recent lumbar fusion on 6/27 who presents to [facility] for evaluation of fever.  Patient had lumbar fusion and laminectomy done on 6/27/2024 by Dr. Wang.  She was seen at his clinic

## 2024-07-19 NOTE — PROGRESS NOTES
Department of Orthopedic Surgery  Spine Service  Shashi Salgado PA-C Progress Note        Subjective:    7/19/24  Dasha is resting in bed. Doing well. Pain controlled. Plan for OR today for lumbar wound I&D. MRI showed fluid pockets with elevated inflammation markers. Infectious disease consulted.     Vitals  VITALS:  /67   Pulse 95   Temp 99.9 °F (37.7 °C) (Oral)   Resp 18   Ht 1.727 m (5' 8\")   Wt 132.9 kg (293 lb 1.6 oz)   SpO2 97%   BMI 44.57 kg/m²   24HR INTAKE/OUTPUT:    Intake/Output Summary (Last 24 hours) at 7/19/2024 0751  Last data filed at 7/18/2024 2314  Gross per 24 hour   Intake 560 ml   Output 700 ml   Net -140 ml     URINARY CATHETER OUTPUT (Yanes):     DRAIN/TUBE OUTPUT:         PHYSICAL EXAM:    Orientation:  alert and oriented to person, place and time    Incision:  Incision is well approximated. Mild drainage.     Lower Extremity Motor :  quadriceps, extensor hallucis longus, dorsiflexion, plantarflexion 5/5 bilaterally  Lower Extremity Sensory:  Intact L1-S1    Flatus:  positive    ABNORMAL EXAM FINDINGS:  none    LABS:    HgB:    Lab Results   Component Value Date/Time    HGB 9.7 07/19/2024 06:44 AM    HGB 12.1 03/18/2012 12:30 PM         ASSESSMENT AND PLAN:    Post op wound infection     1:  NPO  2:  Consent for procedure  3:  OR this afternoon.

## 2024-07-19 NOTE — PROGRESS NOTES
Patient arrived to 6E14 by bed following surgery. Awake and oriented. States pain is 5/10 and is tolerable. SP02 saturations 94% on 2L. Lungs clear. Incision unable to be seen due to large dressing, but is dry and intact. Hemovac compressed. SCD's on bilaterally. IV infusing into LFA without complications. Care board reviewed with patient. Hourly rounding explained. Bed alarm turned on. Call light within reach.  at bedside.

## 2024-07-19 NOTE — PROGRESS NOTES
Pharmacy Note  Additional [employer] Note    [employer] message received from Beth , laboratory employee on 7/18/2024 at 11:57 PM    Second Gram stain result: gram positive cocci in clusters    First and second gram stain congruent? Yes    Second gram stain and [employer] BCID result congruent? Yes  --- NO organisms detected with 2nd sample    Information relayed to previous individual contacted? No      Individual contacted: Nurse Jyoti, pharmacy stated no change at this time    MARY FARR RPH  7/18/2024 11:57 PM

## 2024-07-19 NOTE — ANESTHESIA PRE PROCEDURE
IntraVENous Q8H Saji Qureshi MD   2,000 mg at 07/19/24 1159   • amLODIPine (NORVASC) tablet 10 mg  10 mg Oral Daily Rebecca Prieto MD   10 mg at 07/19/24 0811   • DULoxetine (CYMBALTA) extended release capsule 30 mg  30 mg Oral Daily Rebecca Prieto MD   30 mg at 07/19/24 0811   • propranolol (INDERAL LA) extended release capsule 120 mg  120 mg Oral Daily Rebecca Prieto MD   60 mg at 07/19/24 0856   • levothyroxine (SYNTHROID) tablet 25 mcg  25 mcg Oral Daily Rebecca Prieto MD   25 mcg at 07/19/24 0558   • pantoprazole (PROTONIX) tablet 40 mg  40 mg Oral QAM AC Rebecca Prieto MD   40 mg at 07/19/24 0558   • sodium chloride flush 0.9 % injection 5-40 mL  5-40 mL IntraVENous 2 times per day Rebecca Prieto MD   10 mL at 07/17/24 2249   • sodium chloride flush 0.9 % injection 5-40 mL  5-40 mL IntraVENous PRN Rebecca Prieto MD       • 0.9 % sodium chloride infusion   IntraVENous PRN Rebecca Prieto MD       • enoxaparin Sodium (LOVENOX) injection 30 mg  30 mg SubCUTAneous BID Rebecca Prieto MD   30 mg at 07/18/24 1958   • ondansetron (ZOFRAN-ODT) disintegrating tablet 4 mg  4 mg Oral Q8H PRN Rebecca Prieto MD        Or   • ondansetron (ZOFRAN) injection 4 mg  4 mg IntraVENous Q6H PRN Rebecca Prieto MD       • polyethylene glycol (GLYCOLAX) packet 17 g  17 g Oral Daily PRN Rebecca Prieto MD       • acetaminophen (TYLENOL) tablet 650 mg  650 mg Oral Q6H PRN Rebecca Prieto MD   650 mg at 07/19/24 0337    Or   • acetaminophen (TYLENOL) suppository 650 mg  650 mg Rectal Q6H PRN Rebecca Prieto MD       • lactated ringers IV soln infusion   IntraVENous Continuous Rebecca Prieto  mL/hr at 07/19/24 0936 Rate Verify at 07/19/24 0936   • melatonin tablet 3 mg  3 mg Oral Nightly PRN Rebecca Prieto MD       • morphine (PF) injection 2 mg  2 mg IntraVENous Q2H PRN Rebecca Prieto MD   2 mg at 07/18/24 0329    Or   • morphine (PF) injection 4 mg  4 mg IntraVENous Q2H PRN

## 2024-07-19 NOTE — ANESTHESIA POSTPROCEDURE EVALUATION
Department of Anesthesiology  Postprocedure Note    Patient: Dasha Garcia  MRN: [id number]  YOB: 1965  Date of evaluation: 7/19/2024    Procedure Summary       Date: 07/19/24 Room / Location: [facility] OR 03 / [facility] OR    Anesthesia Start: 1507 Anesthesia Stop: 1641    Procedure: Lumbar I&D (Back) Diagnosis:       Abscess      (Abscess [L02.91])    Surgeons: Alec Wang MD Responsible Provider: Carlito Rosales DO    Anesthesia Type: general ASA Status: 3            Anesthesia Type: No value filed.    Katlyn Phase I: Katlyn Score: 9    Katlyn Phase II:      Anesthesia Post Evaluation    Patient location during evaluation: PACU  Patient participation: complete - patient participated  Level of consciousness: awake  Airway patency: patent  Nausea & Vomiting: no vomiting and no nausea  Cardiovascular status: hemodynamically stable  Respiratory status: acceptable and nasal cannula  Hydration status: stable  Pain management: adequate    No notable events documented.

## 2024-07-19 NOTE — PROGRESS NOTES
1637 Patient arrived to PACU. Arousable to voice. States pain 6/10 at this time. Dressing CDI. Hemovac in place. VSS.    1641 Patient complains of 7/10 pain. Medicated with 50 mcg of Fentanyl at this time.    1646 Patient complains of 7/10 pain. Medicated with 50 mcg of Fentanyl at this time.    1651 Patient complains of 7/10 pain. Medicated with 0.5 mg Dilaudid at this time.    1656 Patient complains of 7/10 pain. Medicated with 0.5 mg Dilaudid at this time.    1701 Patient complains of 7/10 pain. Medicated with 0.5 mg Dilaudid at this time.    1706 Patient complains of 7/10 pain. Medicated with 0.5 mg Dilaudid at this time.    1713 RN attempted to call report to 6A. Will call back.    1720 Patient states pain 4/10 and tolerable. Report given to Adela MOYA on 6A.    1726 Patient meets criteria to discharge from PACU.     1746 Patient transported to 6A in stable condition.

## 2024-07-19 NOTE — PROGRESS NOTES
Progress note: Infectious diseases    Patient - Dasha Garcia,  Age - 59 y.o.    - 1965      Room Number - 6A-14/014-A   N -  [id number]   [facility] # - [id number]  Date of Admission -  2024  5:03 PM    SUBJECTIVE:   She has low grade fever  Blood cx  Kareem leija   OBJECTIVE   VITALS    height is 1.727 m (5' 8\") and weight is 132.9 kg (293 lb 1.6 oz). Her oral temperature is 98.7 °F (37.1 °C). Her blood pressure is 127/74 and her pulse is 84. Her respiration is 16 and oxygen saturation is 95%.       Wt Readings from Last 3 Encounters:   24 132.9 kg (293 lb 1.6 oz)   24 133.8 kg (295 lb)   24 134.1 kg (295 lb 9.6 oz)       I/O (24 Hours)    Intake/Output Summary (Last 24 hours) at 2024 1015  Last data filed at 2024 0936  Gross per 24 hour   Intake 1872.44 ml   Output 1700 ml   Net 172.44 ml       General Appearance  Awake, alert, oriented,  not  In acute distress  HEENT - normocephalic, atraumatic,pale  conjunctiva,  anicteric sclera  Neck - Supple, no mass  Lungs -  Bilateral   air entry, no rhonchi, no wheeze  Cardiovascular - Heart sounds are normal.     Abdomen - soft, not distended, nontender,   Neurologic -oriented  Skin - No bruising or bleeding  Extremities - No edema, no cyanosis, clubbing     MEDICATIONS:      ceFAZolin  2,000 mg IntraVENous Q8H    amLODIPine  10 mg Oral Daily    DULoxetine  30 mg Oral Daily    propranolol  120 mg Oral Daily    levothyroxine  25 mcg Oral Daily    pantoprazole  40 mg Oral QAM AC    sodium chloride flush  5-40 mL IntraVENous 2 times per day    enoxaparin  30 mg SubCUTAneous BID    gabapentin  100 mg Oral Nightly      sodium chloride      lactated ringers IV soln 100 mL/hr at 24 0936     sodium chloride flush, sodium chloride, ondansetron **OR** ondansetron, polyethylene glycol, acetaminophen **OR** acetaminophen, melatonin, morphine  **OR** morphine, cyclobenzaprine      LABS:     CBC:   Recent Labs     07/17/24  1800 07/18/24  0626 07/19/24  0644   WBC 11.8* 10.9* 9.4   HGB 11.5* 9.8* 9.7*    268 284     BMP:    Recent Labs     07/17/24  1842 07/18/24  0626 07/19/24  0644    140 139   K 4.0 3.9 3.7    107 106   CO2 24 20* 23   BUN 13 12 9   CREATININE 0.6 0.5 0.5   GLUCOSE 122* 130* 103     Calcium:  Recent Labs     07/19/24  0644   CALCIUM 8.5     Ionized Calcium:Invalid input(s): \"IONCA\"  Magnesium:  Recent Labs     07/19/24  0644   MG 1.9     Phosphorus:  Recent Labs     07/19/24  0644   PHOS 3.3      Hepatic:   Recent Labs     07/17/24 1842   ALKPHOS 97   ALT 7*   AST 14   BILITOT 0.5      CULTURES:   UA: No results for input(s): \"SPECGRAV\", \"PHUR\", \"COLORU\", \"CLARITYU\", \"MUCUS\", \"PROTEINU\", \"BLOODU\", \"RBCUA\", \"WBCUA\", \"BACTERIA\", \"NITRU\", \"GLUCOSEU\", \"BILIRUBINUR\", \"UROBILINOGEN\", \"KETUA\", \"LABCAST\", \"LABCASTTY\", \"AMORPHOS\" in the last 72 hours.    Invalid input(s): \"CRYSTALS\"  Micro:   Lab Results   Component Value Date/Time    BC No growth 24 hours. 07/17/2024 06:08 PM            Problem list of patient:     Patient Active Problem List   Diagnosis Code    Iron deficiency anemia, unspecified D50.9    Malabsorption K90.9    S/P cervical spinal fusion Z98.1    Spinal stenosis of lumbar region with neurogenic claudication M48.062    Abscess L02.91    Rheumatoid arthritis (HCC) M06.9    Hypertension I10         ASSESSMENT/PLAN   Fever likely related  lumbar wound infection  Drainage from a recent lumbar wound: CT scan shows inflammatory changes with possible abscess.  She had elevated markers of inflammation  Surgery scheduled for later today  Blood cx 1/2 for coag negative staph identified as Staph lugdunense  (known to cause serous  infections )  Continue iv manjula Qureshi MD, 7/19/2024 10:15 AM

## 2024-07-19 NOTE — PROGRESS NOTES
Pharmacy Note  BioFire Result    Dasha Garcia is a 59 y.o. female, with a positive blood culture result    Communication received from Beth, laboratory employee on 7/18/2024 at 9:36 PM    First Gram stain result: gram positive cocci in clusters    BioFire BCID result: Staphylococcus lugdunensis mecA NOT detected    BioFire BCID and gram stain congruent? Yes    Suspected source? Lumbar Wound    Patient chart has been reviewed for signs/symptoms of infection: Yes  BP (!) 143/74   Pulse 95   Temp 99.5 °F (37.5 °C) (Oral)   Resp 18   Ht 1.727 m (5' 8\")   Wt 132.9 kg (293 lb 1.6 oz)   SpO2 97%   BMI 44.57 kg/m²   Lab Results   Component Value Date    WBC 10.9 (H) 07/18/2024     Allergies reviewed  Amoxicillin and Cefdinir    Renal function reviewed  Estimated Creatinine Clearance: 179 mL/min (based on SCr of 0.5 mg/dL).    Current antibiotic regimen: Ancef    Intervention needed: No    Individual contacted: Dmitriy Jackson CNP    Recommendations: No change    Recommendations accepted? N/A    Angel Marie [facility]  7/18/2024 9:36 PM

## 2024-07-19 NOTE — PLAN OF CARE
Problem: Discharge Planning  Goal: Discharge to home or other facility with appropriate resources  7/19/2024 1007 by Sturgeon, Cara B, RN  Outcome: Progressing  Flowsheets (Taken 7/19/2024 1007)  Discharge to home or other facility with appropriate resources:   Arrange for needed discharge resources and transportation as appropriate   Identify barriers to discharge with patient and caregiver   Identify discharge learning needs (meds, wound care, etc)     Problem: Safety - Adult  Goal: Free from fall injury  7/19/2024 1007 by Sturgeon, Cara B, RN  Outcome: Progressing  Flowsheets (Taken 7/19/2024 1007)  Free From Fall Injury: Instruct family/caregiver on patient safety     Problem: ABCDS Injury Assessment  Goal: Absence of physical injury  7/19/2024 1007 by Sturgeon, Cara B, RN  Outcome: Progressing  Flowsheets (Taken 7/19/2024 1007)  Absence of Physical Injury: Implement safety measures based on patient assessment     Problem: Skin/Tissue Integrity - Adult  Goal: Skin integrity remains intact  7/19/2024 1007 by Sturgeon, Cara B, RN  Outcome: Progressing  Flowsheets (Taken 7/19/2024 1007)  Skin Integrity Remains Intact:   Assess vascular access sites hourly   Monitor for areas of redness and/or skin breakdown     Problem: Skin/Tissue Integrity - Adult  Goal: Incisions, wounds, or drain sites healing without S/S of infection  7/19/2024 1007 by Sturgeon, Cara B, RN  Outcome: Progressing  Flowsheets (Taken 7/19/2024 1007)  Incisions, Wounds, or Drain Sites Healing Without Sign and Symptoms of Infection: TWICE DAILY: Assess and document dressing/incision, wound bed, drain sites and surrounding tissue     Problem: Infection - Adult  Goal: Absence of infection at discharge  7/19/2024 1007 by Sturgeon, Cara B, RN  Outcome: Progressing  Flowsheets (Taken 7/19/2024 1007)  Absence of infection at discharge:   Monitor lab/diagnostic results   Assess and monitor for signs and symptoms of infection   Monitor all insertion sites  i.e., indwelling lines, tubes and drains   Administer medications as ordered   Luxor appropriate cooling/warming therapies per order   Instruct and encourage patient and family to use good hand hygiene technique     Problem: Pain  Goal: Verbalizes/displays adequate comfort level or baseline comfort level  7/19/2024 1007 by Sturgeon, Cara B, RN  Outcome: Progressing  Flowsheets (Taken 7/19/2024 1007)  Verbalizes/displays adequate comfort level or baseline comfort level:   Assess pain using appropriate pain scale   Administer analgesics based on type and severity of pain and evaluate response   Implement non-pharmacological measures as appropriate and evaluate response   Encourage patient to monitor pain and request assistance   Consider cultural and social influences on pain and pain management   Notify Licensed Independent Practitioner if interventions unsuccessful or patient reports new pain

## 2024-07-19 NOTE — BRIEF OP NOTE
Brief Postoperative Note      Patient: Dasha Garcia  YOB: 1965  MRN: [id number]    Date of Procedure: 7/19/2024    Pre-Op Diagnosis Codes:     * Abscess [L02.91]    Post-Op Diagnosis: Same       Procedure(s):  Lumbar I&D    Surgeon(s):  Alec Wang MD    Assistant:   Assistant: Hank Zimmerman MA  Physician Assistant: Marlno Orr PA-C    Anesthesia: General    Estimated Blood Loss (mL): less than 50     Complications: None    Specimens:   ID Type Source Tests Collected by Time Destination   1 : Lumbar wound tissue Tissue Spine GRAM STAIN, CULTURE, ANAEROBIC AND AEROBIC Alec Wang MD 7/19/2024 1553    2 : Lumbar wound fluid Body Fluid Spine GRAM STAIN, CULTURE, ANAEROBIC AND AEROBIC Alec Wang MD 7/19/2024 1604        Implants:  * No implants in log *      Drains:   [REMOVED] Closed/Suction Drain Left Back Bulb (Removed)   Site Description Unable to view 06/29/24 1126   Dressing Status Clean, dry & intact 06/29/24 1126   Drainage Appearance Bloody 06/29/24 1126   Drain Status Compressed 06/29/24 1126   Output (ml) 15 ml 06/29/24 1126       [REMOVED] Closed/Suction Drain Right Back Bulb (Removed)   Site Description Unable to view 06/29/24 1126   Dressing Status Clean, dry & intact 06/29/24 1126   Drainage Appearance Bloody 06/29/24 1126   Drain Status Compressed 06/29/24 1126   Output (ml) 45 ml 06/29/24 1126       [REMOVED] Urinary Catheter 06/26/24 Yanes-Temperature (Removed)   Catheter Indications Perioperative use for selected surgical procedures 06/27/24 0800   Site Assessment No urethral drainage 06/27/24 0800   Urine Color Yellow 06/27/24 0402   Urine Appearance Clear 06/27/24 0402   Urine Odor Other (Comment) 06/27/24 0402   Collection Container Standard 06/27/24 0402   Securement Method Securing device (Describe) 06/27/24 0402   Catheter Care  Soap and water 06/27/24 0402   Catheter Best Practices  Bag not on floor;Bag below bladder 06/27/24 0402   Status Draining

## 2024-07-19 NOTE — CARE COORDINATION
7/19/24, 12:22 PM EDT    DISCHARGE PLANNING EVALUATION    Spoke with nurse this afternoon, discussed if pt going home this weekend and needing wound care/dressing changes will need HH orders and to call [facility] and fax orders and discharge AVS. Copy of this information on chart as well. Nurse reports pt may be having wound vac placed, SW updated CM.

## 2024-07-19 NOTE — PLAN OF CARE
Problem: Discharge Planning  Goal: Discharge to home or other facility with appropriate resources  7/18/2024 2328 by Jyoti Robertson, RN  Outcome: Progressing  Flowsheets (Taken 7/18/2024 1959)  Discharge to home or other facility with appropriate resources:   Identify barriers to discharge with patient and caregiver   Arrange for needed discharge resources and transportation as appropriate   Identify discharge learning needs (meds, wound care, etc)   Refer to discharge planning if patient needs post-hospital services based on physician order or complex needs related to functional status, cognitive ability or social support system  7/18/2024 1822 by Shanon Melchor RN  Outcome: Progressing  Flowsheets (Taken 7/18/2024 1822)  Discharge to home or other facility with appropriate resources: Identify barriers to discharge with patient and caregiver  7/18/2024 1221 by Carmen Bell LSW  Outcome: Progressing     Problem: Safety - Adult  Goal: Free from fall injury  Outcome: Progressing  Flowsheets (Taken 7/18/2024 2328)  Free From Fall Injury: Instruct family/caregiver on patient safety     Problem: Skin/Tissue Integrity - Adult  Goal: Skin integrity remains intact  7/18/2024 2328 by Jyoti Robertson, RN  Outcome: Progressing  Flowsheets  Taken 7/18/2024 2327  Skin Integrity Remains Intact:   Assess vascular access sites hourly   Monitor for areas of redness and/or skin breakdown  Taken 7/18/2024 1959  Skin Integrity Remains Intact:   Assess vascular access sites hourly   Monitor for areas of redness and/or skin breakdown  7/18/2024 1822 by Shanon Melchor RN  Outcome: Progressing  Flowsheets (Taken 7/18/2024 1822)  Skin Integrity Remains Intact: Monitor for areas of redness and/or skin breakdown  Goal: Incisions, wounds, or drain sites healing without S/S of infection  Outcome: Progressing  Flowsheets  Taken 7/18/2024 2327  Incisions, Wounds, or Drain Sites Healing Without Sign and Symptoms of

## 2024-07-20 LAB
ANION GAP SERPL CALC-SCNC: 12 MEQ/L (ref 8–16)
BACTERIA BLD AEROBE CULT: ABNORMAL
BUN SERPL-MCNC: 10 MG/DL (ref 7–22)
CALCIUM SERPL-MCNC: 8.6 MG/DL (ref 8.5–10.5)
CHLORIDE SERPL-SCNC: 105 MEQ/L (ref 98–111)
CO2 SERPL-SCNC: 23 MEQ/L (ref 23–33)
CREAT SERPL-MCNC: 0.4 MG/DL (ref 0.4–1.2)
DEPRECATED RDW RBC AUTO: 42.8 FL (ref 35–45)
ERYTHROCYTE [DISTWIDTH] IN BLOOD BY AUTOMATED COUNT: 12.7 % (ref 11.5–14.5)
GFR SERPL CREATININE-BSD FRML MDRD: > 90 ML/MIN/1.73M2
GLUCOSE SERPL-MCNC: 119 MG/DL (ref 70–108)
HCT VFR BLD AUTO: 29.7 % (ref 37–47)
HGB BLD-MCNC: 9.6 GM/DL (ref 12–16)
MAGNESIUM SERPL-MCNC: 1.9 MG/DL (ref 1.6–2.4)
MCH RBC QN AUTO: 29.8 PG (ref 26–33)
MCHC RBC AUTO-ENTMCNC: 32.3 GM/DL (ref 32.2–35.5)
MCV RBC AUTO: 92.2 FL (ref 81–99)
ORGANISM: ABNORMAL
ORGANISM: ABNORMAL
PHOSPHATE SERPL-MCNC: 3.7 MG/DL (ref 2.4–4.7)
PLATELET # BLD AUTO: 313 THOU/MM3 (ref 130–400)
PMV BLD AUTO: 10.1 FL (ref 9.4–12.4)
POTASSIUM SERPL-SCNC: 4 MEQ/L (ref 3.5–5.2)
RBC # BLD AUTO: 3.22 MILL/MM3 (ref 4.2–5.4)
SODIUM SERPL-SCNC: 140 MEQ/L (ref 135–145)
WBC # BLD AUTO: 7.2 THOU/MM3 (ref 4.8–10.8)

## 2024-07-20 PROCEDURE — 6370000000 HC RX 637 (ALT 250 FOR IP): Performed by: PHYSICIAN ASSISTANT

## 2024-07-20 PROCEDURE — 1200000000 HC SEMI PRIVATE

## 2024-07-20 PROCEDURE — 2580000003 HC RX 258: Performed by: PHYSICIAN ASSISTANT

## 2024-07-20 PROCEDURE — 83735 ASSAY OF MAGNESIUM: CPT

## 2024-07-20 PROCEDURE — 99233 SBSQ HOSP IP/OBS HIGH 50: CPT | Performed by: PHYSICIAN ASSISTANT

## 2024-07-20 PROCEDURE — 85027 COMPLETE CBC AUTOMATED: CPT

## 2024-07-20 PROCEDURE — 6360000002 HC RX W HCPCS: Performed by: PHYSICIAN ASSISTANT

## 2024-07-20 PROCEDURE — 80048 BASIC METABOLIC PNL TOTAL CA: CPT

## 2024-07-20 PROCEDURE — 36415 COLL VENOUS BLD VENIPUNCTURE: CPT

## 2024-07-20 PROCEDURE — 84100 ASSAY OF PHOSPHORUS: CPT

## 2024-07-20 RX ADMIN — AMLODIPINE BESYLATE 10 MG: 10 TABLET ORAL at 08:28

## 2024-07-20 RX ADMIN — DULOXETINE HYDROCHLORIDE 30 MG: 30 CAPSULE, DELAYED RELEASE ORAL at 08:29

## 2024-07-20 RX ADMIN — BISACODYL 5 MG: 5 TABLET ORAL at 08:28

## 2024-07-20 RX ADMIN — WATER 2000 MG: 1 INJECTION INTRAMUSCULAR; INTRAVENOUS; SUBCUTANEOUS at 19:49

## 2024-07-20 RX ADMIN — Medication 3 MG: at 22:36

## 2024-07-20 RX ADMIN — ACETAMINOPHEN 650 MG: 325 TABLET ORAL at 14:56

## 2024-07-20 RX ADMIN — LEVOTHYROXINE SODIUM 25 MCG: 0.03 TABLET ORAL at 06:05

## 2024-07-20 RX ADMIN — PANTOPRAZOLE SODIUM 40 MG: 40 TABLET, DELAYED RELEASE ORAL at 06:05

## 2024-07-20 RX ADMIN — WATER 2000 MG: 1 INJECTION INTRAMUSCULAR; INTRAVENOUS; SUBCUTANEOUS at 11:45

## 2024-07-20 RX ADMIN — SODIUM CHLORIDE, PRESERVATIVE FREE 10 ML: 5 INJECTION INTRAVENOUS at 20:06

## 2024-07-20 RX ADMIN — WATER 2000 MG: 1 INJECTION INTRAMUSCULAR; INTRAVENOUS; SUBCUTANEOUS at 03:53

## 2024-07-20 RX ADMIN — GABAPENTIN 100 MG: 100 CAPSULE ORAL at 19:58

## 2024-07-20 RX ADMIN — SENNOSIDES AND DOCUSATE SODIUM 1 TABLET: 50; 8.6 TABLET ORAL at 19:58

## 2024-07-20 RX ADMIN — SENNOSIDES AND DOCUSATE SODIUM 1 TABLET: 50; 8.6 TABLET ORAL at 08:23

## 2024-07-20 RX ADMIN — POLYETHYLENE GLYCOL 3350 17 G: 17 POWDER, FOR SOLUTION ORAL at 08:23

## 2024-07-20 RX ADMIN — VANCOMYCIN HYDROCHLORIDE 2000 MG: 5 INJECTION, POWDER, LYOPHILIZED, FOR SOLUTION INTRAVENOUS at 03:43

## 2024-07-20 RX ADMIN — PROPRANOLOL HYDROCHLORIDE 120 MG: 60 CAPSULE, EXTENDED RELEASE ORAL at 08:29

## 2024-07-20 RX ADMIN — ACETAMINOPHEN 650 MG: 325 TABLET ORAL at 22:36

## 2024-07-20 RX ADMIN — MORPHINE SULFATE 2 MG: 2 INJECTION, SOLUTION INTRAMUSCULAR; INTRAVENOUS at 00:24

## 2024-07-20 ASSESSMENT — PAIN DESCRIPTION - ORIENTATION
ORIENTATION: MID;POSTERIOR
ORIENTATION: RIGHT;LOWER

## 2024-07-20 ASSESSMENT — PAIN SCALES - GENERAL
PAINLEVEL_OUTOF10: 4
PAINLEVEL_OUTOF10: 3
PAINLEVEL_OUTOF10: 2
PAINLEVEL_OUTOF10: 6
PAINLEVEL_OUTOF10: 3

## 2024-07-20 ASSESSMENT — PAIN - FUNCTIONAL ASSESSMENT: PAIN_FUNCTIONAL_ASSESSMENT: ACTIVITIES ARE NOT PREVENTED

## 2024-07-20 ASSESSMENT — PAIN DESCRIPTION - LOCATION
LOCATION: BACK
LOCATION: BACK;ARM

## 2024-07-20 ASSESSMENT — PAIN DESCRIPTION - DESCRIPTORS: DESCRIPTORS: SORE

## 2024-07-20 NOTE — PROGRESS NOTES
Progress note: Infectious diseases    Patient - Dasha Garcia,  Age - 59 y.o.    - 1965      Room Number - 6A-14/014-A   N -  [id number]   [facility] # - [id number]  Date of Admission -  2024  5:03 PM    SUBJECTIVE:   She had I and D of the lumbar wound, the purulence was on the superficial area per the operation note  Blood cx +ve for micrococcus and staph lugdinensis   OBJECTIVE   VITALS    height is 1.727 m (5' 8\") and weight is 132.9 kg (293 lb 1.6 oz). Her oral temperature is 99.3 °F (37.4 °C). Her blood pressure is 121/74 and her pulse is 87. Her respiration is 16 and oxygen saturation is 95%.       Wt Readings from Last 3 Encounters:   24 132.9 kg (293 lb 1.6 oz)   24 133.8 kg (295 lb)   24 134.1 kg (295 lb 9.6 oz)       I/O (24 Hours)    Intake/Output Summary (Last 24 hours) at 2024 1055  Last data filed at 2024 0853  Gross per 24 hour   Intake 1460 ml   Output 2845 ml   Net -1385 ml         General Appearance  Awake, alert, oriented,  not  In acute distress  HEENT - normocephalic, atraumatic,pale  conjunctiva,  anicteric sclera  Neck - Supple, no mass  Lungs -  Bilateral   air entry, no rhonchi, no wheeze  Cardiovascular - Heart sounds are normal.     Abdomen - soft, not distended, nontender,   Neurologic -oriented  Skin - No bruising or bleeding  Extremities - dressed lumbar wound, hemovac with minimal drainage    MEDICATIONS:      sodium chloride flush  5-40 mL IntraVENous 2 times per day    polyethylene glycol  17 g Oral Daily    bisacodyl  5 mg Oral Daily    sennosides-docusate sodium  1 tablet Oral BID    ceFAZolin  2,000 mg IntraVENous Q8H    amLODIPine  10 mg Oral Daily    DULoxetine  30 mg Oral Daily    propranolol  120 mg Oral Daily    levothyroxine  25 mcg Oral Daily    pantoprazole  40 mg Oral QAM AC    sodium chloride flush  5-40 mL IntraVENous 2 times per day     likely related  lumbar wound infection  She had I and D. The purulent was superficial  Continue iv ancef  Will follow cx report  If wound cx is negative will consider oral antibiotic for discharge      Saji Qureshi MD, 7/20/2024 10:55 AM

## 2024-07-20 NOTE — PROGRESS NOTES
Department of Orthopedic Surgery  Spine Service  Shashi Salgado PA-C Progress Note        Subjective:    7/19/24  Dasha is resting in bed. Doing well. Pain controlled. Plan for OR today for lumbar wound I&D. MRI showed fluid pockets with elevated inflammation markers. Infectious disease consulted.    7/20/2024  Dasha is resting in bed.  She is doing well.  She is ambulating independently.  Cultures are negative so far.  Dr. Cochran is following.  Awaiting final antibiotic plan for discharge.    Vitals  VITALS:  /73   Pulse 86   Temp 98.2 °F (36.8 °C) (Oral)   Resp 16   Ht 1.727 m (5' 8\")   Wt 132.9 kg (293 lb 1.6 oz)   SpO2 93%   BMI 44.57 kg/m²   24HR INTAKE/OUTPUT:    Intake/Output Summary (Last 24 hours) at 7/20/2024 1145  Last data filed at 7/20/2024 0853  Gross per 24 hour   Intake 1460 ml   Output 2845 ml   Net -1385 ml     URINARY CATHETER OUTPUT (Yanes):     DRAIN/TUBE OUTPUT:  Closed/Suction Drain Inferior;Midline Back-Output (ml): 70 ml      PHYSICAL EXAM:    Orientation:  alert and oriented to person, place and time    Incision:  Incision is well approximated. Mild drainage.     Lower Extremity Motor :  quadriceps, extensor hallucis longus, dorsiflexion, plantarflexion 5/5 bilaterally  Lower Extremity Sensory:  Intact L1-S1    Flatus:  positive    ABNORMAL EXAM FINDINGS:  none    LABS:    HgB:    Lab Results   Component Value Date/Time    HGB 9.6 07/20/2024 06:35 AM    HGB 12.1 03/18/2012 12:30 PM         ASSESSMENT AND PLAN:    Post op wound infection     1: Monitor cultures  2: Out of bed as tolerated

## 2024-07-20 NOTE — PLAN OF CARE
Problem: Discharge Planning  Goal: Discharge to home or other facility with appropriate resources  7/19/2024 2306 by Jyoti Robertson, RN  Outcome: Progressing  Flowsheets (Taken 7/19/2024 2019)  Discharge to home or other facility with appropriate resources:   Identify barriers to discharge with patient and caregiver   Arrange for needed discharge resources and transportation as appropriate   Identify discharge learning needs (meds, wound care, etc)   Refer to discharge planning if patient needs post-hospital services based on physician order or complex needs related to functional status, cognitive ability or social support system  7/19/2024 1007 by Sturgeon, Cara B, RN  Outcome: Progressing  Flowsheets (Taken 7/19/2024 1007)  Discharge to home or other facility with appropriate resources:   Arrange for needed discharge resources and transportation as appropriate   Identify barriers to discharge with patient and caregiver   Identify discharge learning needs (meds, wound care, etc)     Problem: Safety - Adult  Goal: Free from fall injury  7/19/2024 2306 by Jyoti Robertson RN  Outcome: Progressing  Flowsheets (Taken 7/19/2024 2306)  Free From Fall Injury: Instruct family/caregiver on patient safety  7/19/2024 1007 by Sturgeon, Cara B, RN  Outcome: Progressing  Flowsheets (Taken 7/19/2024 1007)  Free From Fall Injury: Instruct family/caregiver on patient safety     Problem: ABCDS Injury Assessment  Goal: Absence of physical injury  7/19/2024 2306 by Jyoti Robertson, RN  Outcome: Progressing  Flowsheets (Taken 7/19/2024 2306)  Absence of Physical Injury: Implement safety measures based on patient assessment  7/19/2024 1007 by Sturgeon, Cara B, RN  Outcome: Progressing  Flowsheets (Taken 7/19/2024 1007)  Absence of Physical Injury: Implement safety measures based on patient assessment     Problem: Skin/Tissue Integrity - Adult  Goal: Skin integrity remains intact  7/19/2024 2306 by Jyoti Robertson,  RN  Outcome: Progressing  Flowsheets  Taken 7/19/2024 2306  Skin Integrity Remains Intact:   Assess vascular access sites hourly   Monitor for areas of redness and/or skin breakdown  Taken 7/19/2024 2019  Skin Integrity Remains Intact:   Assess vascular access sites hourly   Monitor for areas of redness and/or skin breakdown  7/19/2024 1007 by Sturgeon, Cara B, RN  Outcome: Progressing  Flowsheets (Taken 7/19/2024 1007)  Skin Integrity Remains Intact:   Assess vascular access sites hourly   Monitor for areas of redness and/or skin breakdown  Goal: Incisions, wounds, or drain sites healing without S/S of infection  7/19/2024 2306 by Jyoti Robertson, RN  Outcome: Progressing  Flowsheets  Taken 7/19/2024 2306  Incisions, Wounds, or Drain Sites Healing Without Sign and Symptoms of Infection:   ADMISSION and DAILY: Assess and document risk factors for pressure ulcer development   TWICE DAILY: Assess and document dressing/incision, wound bed, drain sites and surrounding tissue   Implement wound care per orders   TWICE DAILY: Assess and document skin integrity  Taken 7/19/2024 2019  Incisions, Wounds, or Drain Sites Healing Without Sign and Symptoms of Infection:   ADMISSION and DAILY: Assess and document risk factors for pressure ulcer development   TWICE DAILY: Assess and document skin integrity   TWICE DAILY: Assess and document dressing/incision, wound bed, drain sites and surrounding tissue   Implement wound care per orders  7/19/2024 1007 by Sturgeon, Cara B, RN  Outcome: Progressing  Flowsheets (Taken 7/19/2024 1007)  Incisions, Wounds, or Drain Sites Healing Without Sign and Symptoms of Infection: TWICE DAILY: Assess and document dressing/incision, wound bed, drain sites and surrounding tissue     Problem: Infection - Adult  Goal: Absence of infection at discharge  7/19/2024 2306 by Jyoti Robertson, RN  Outcome: Progressing  Flowsheets (Taken 7/19/2024 2019)  Absence of infection at discharge:   Assess and

## 2024-07-20 NOTE — OP NOTE
[address] [address]                            OPERATIVE REPORT      PATIENT NAME: JARRELL DUONG             : 1965  MED REC NO: [id number]                       ROOM: [facility]  ACCOUNT NO: [id number]                       ADMIT DATE: 2024  PROVIDER: Alec Wang MD      DATE OF PROCEDURE:  2024    SURGEON:  Alec Wang MD    PREOPERATIVE DIAGNOSES:    1. Status post lumbar spine L2 through L5 laminectomy and fusion from 2024.  2. Obesity, body mass index of 44.57 as the patient is 132.9 kg in weight and 5 feet 8 inches in height.    POSTOPERATIVE DIAGNOSES:    1. Status post lumbar spine L2 through L5 laminectomy and fusion from 2024.  2. Obesity, body mass index of 44.57 as the patient is 132.9 kg in weight and 5 feet 8 inches in height.    PROCEDURE PERFORMED:  Incision, irrigation and debridement excisional of lumbar wound with evacuation of deep serous fluid.    ASSISTANT:  Marlon Orr PA-C, who assisted with the patient positioning, prepping, draping, surgical retraction, decompressive wound debridement, and deep wound seroma evacuation with wound closure.    ANESTHESIA:  General.    BLOOD LOSS:  Less than 50 mL including serous fluid taken from the lumbar tissue wound.    DRAIN:  Hemovac.    SPECIMEN:    1. Suprafascial wound tissue for microbiologic aerobic and anaerobic culturing.  2. Deep serous fluid for microbiologic aerobic and anaerobic culturing.    INDICATION:  The patient is 59 years of age and has undergone surgery on date of 2024.  She underwent a lumbar spine decompressive laminectomy and fusion.  We had a telephone call from the patient over the weekend, admitted to the hospital after we were informed of her wound opening.  She has some expression of fluid and had a slight elevated fever last weekend and therefore had came to the hospital where she would be

## 2024-07-20 NOTE — PROGRESS NOTES
Hospitalist Progress Note      Patient:  Dasha Garcia 59 y.o. female     Unit/Bed:6A-14/014-A    Date of Admission: 7/17/2024      ASSESSMENT AND PLAN    Active Problems  Postoperative wound infection of lumbar region / hx of lumbar spinal stenosis  CT lumbar spine 7/17/24 that showed:Inflammatory changes within the superficial subcutaneous fat at the incision site, with a low-density peripherally enhancing fluid collection in the deeper subcutaneous fat and paraspinous musculature. Findings may represent abscess or seroma.   Hx of Lumbar Fusion s/p L2-L5 laminectomy + spinal fusion 6/27/24 with Dr. Wang  Ortho spine has been consulted  Pt taken for I&D of lumbar wound with evacuation of deep serous fluid 7/19  ID consulted to help manage abx  A&A cultures with no growth thus far  Pain control  PT/OT  Staph lugdunense bacteremia  Noted on 1/2 blood cultures, as well as Biofire  ID consulted  Cont on Ancef. (S/p Vanc which was stopped)      Resolved Problems  NAGMA    Chronic Conditions (reviewed and stable unless otherwise stated)  Essential hypertension  Continue home medications and monitor BP  RA  On Amjevita, cont at discharge  GERD  Protonix  Chronic normocytic anemia:   Currently stable with hemoglobin in the 9 range, continue to monitor and transfuse for hemoglobin less than 7      LDA: []CVC / []PICC / []Midline / []Yanes / [x]Drains / []Mediport / []None  Antibiotics: Ancef  Steroids:   Labs (still needed?): [x]Yes / []No  IVF (still needed?): [x]Yes / []No    Level of care: []Step Down / [x]Med-Surg  Bed Status: [x]Inpatient / []Observation  Telemetry: []Yes / [x]No  PT/OT: [x]Yes / []No    DVT Prophylaxis: [] Lovenox / [] Heparin / [x] SCDs / [] Already on Systemic Anticoagulation / [] None   Code status: Full Code     Expected discharge date:  ?  Disposition: Home     ===================================================================    Chief Complaint: Fever   Subjective (past 24 hours):

## 2024-07-21 VITALS
HEART RATE: 87 BPM | TEMPERATURE: 98.1 F | WEIGHT: 293 LBS | OXYGEN SATURATION: 98 % | HEIGHT: 68 IN | DIASTOLIC BLOOD PRESSURE: 85 MMHG | BODY MASS INDEX: 44.41 KG/M2 | SYSTOLIC BLOOD PRESSURE: 135 MMHG | RESPIRATION RATE: 17 BRPM

## 2024-07-21 PROBLEM — T81.40XA POSTOPERATIVE INFECTION: Status: ACTIVE | Noted: 2024-07-21

## 2024-07-21 LAB
ANION GAP SERPL CALC-SCNC: 8 MEQ/L (ref 8–16)
BACTERIA SPEC AEROBE CULT: NORMAL
BACTERIA SPEC ANAEROBE CULT: NORMAL
BUN SERPL-MCNC: 13 MG/DL (ref 7–22)
CALCIUM SERPL-MCNC: 8.6 MG/DL (ref 8.5–10.5)
CHLORIDE SERPL-SCNC: 108 MEQ/L (ref 98–111)
CO2 SERPL-SCNC: 28 MEQ/L (ref 23–33)
CREAT SERPL-MCNC: 0.5 MG/DL (ref 0.4–1.2)
DEPRECATED RDW RBC AUTO: 43.8 FL (ref 35–45)
ERYTHROCYTE [DISTWIDTH] IN BLOOD BY AUTOMATED COUNT: 12.9 % (ref 11.5–14.5)
GFR SERPL CREATININE-BSD FRML MDRD: > 90 ML/MIN/1.73M2
GLUCOSE SERPL-MCNC: 95 MG/DL (ref 70–108)
GRAM STN SPEC: NORMAL
HCT VFR BLD AUTO: 28.9 % (ref 37–47)
HGB BLD-MCNC: 9.3 GM/DL (ref 12–16)
MAGNESIUM SERPL-MCNC: 1.9 MG/DL (ref 1.6–2.4)
MCH RBC QN AUTO: 29.9 PG (ref 26–33)
MCHC RBC AUTO-ENTMCNC: 32.2 GM/DL (ref 32.2–35.5)
MCV RBC AUTO: 92.9 FL (ref 81–99)
PHOSPHATE SERPL-MCNC: 3.6 MG/DL (ref 2.4–4.7)
PLATELET # BLD AUTO: 324 THOU/MM3 (ref 130–400)
PMV BLD AUTO: 9.7 FL (ref 9.4–12.4)
POTASSIUM SERPL-SCNC: 3.7 MEQ/L (ref 3.5–5.2)
RBC # BLD AUTO: 3.11 MILL/MM3 (ref 4.2–5.4)
SODIUM SERPL-SCNC: 144 MEQ/L (ref 135–145)
WBC # BLD AUTO: 6.7 THOU/MM3 (ref 4.8–10.8)

## 2024-07-21 PROCEDURE — 80048 BASIC METABOLIC PNL TOTAL CA: CPT

## 2024-07-21 PROCEDURE — 6370000000 HC RX 637 (ALT 250 FOR IP): Performed by: PHYSICIAN ASSISTANT

## 2024-07-21 PROCEDURE — 84100 ASSAY OF PHOSPHORUS: CPT

## 2024-07-21 PROCEDURE — 6360000002 HC RX W HCPCS: Performed by: PHYSICIAN ASSISTANT

## 2024-07-21 PROCEDURE — 83735 ASSAY OF MAGNESIUM: CPT

## 2024-07-21 PROCEDURE — 1200000000 HC SEMI PRIVATE

## 2024-07-21 PROCEDURE — 85027 COMPLETE CBC AUTOMATED: CPT

## 2024-07-21 PROCEDURE — 99232 SBSQ HOSP IP/OBS MODERATE 35: CPT | Performed by: INTERNAL MEDICINE

## 2024-07-21 PROCEDURE — 2580000003 HC RX 258: Performed by: PHYSICIAN ASSISTANT

## 2024-07-21 PROCEDURE — 36415 COLL VENOUS BLD VENIPUNCTURE: CPT

## 2024-07-21 RX ORDER — HYDRALAZINE HYDROCHLORIDE 20 MG/ML
5 INJECTION INTRAMUSCULAR; INTRAVENOUS EVERY 6 HOURS PRN
Status: DISCONTINUED | OUTPATIENT
Start: 2024-07-21 | End: 2024-07-22 | Stop reason: HOSPADM

## 2024-07-21 RX ADMIN — WATER 2000 MG: 1 INJECTION INTRAMUSCULAR; INTRAVENOUS; SUBCUTANEOUS at 04:04

## 2024-07-21 RX ADMIN — GABAPENTIN 100 MG: 100 CAPSULE ORAL at 20:31

## 2024-07-21 RX ADMIN — BISACODYL 5 MG: 5 TABLET ORAL at 07:19

## 2024-07-21 RX ADMIN — LEVOTHYROXINE SODIUM 25 MCG: 0.03 TABLET ORAL at 05:42

## 2024-07-21 RX ADMIN — PANTOPRAZOLE SODIUM 40 MG: 40 TABLET, DELAYED RELEASE ORAL at 05:42

## 2024-07-21 RX ADMIN — Medication 3 MG: at 20:31

## 2024-07-21 RX ADMIN — WATER 2000 MG: 1 INJECTION INTRAMUSCULAR; INTRAVENOUS; SUBCUTANEOUS at 11:31

## 2024-07-21 RX ADMIN — PROPRANOLOL HYDROCHLORIDE 120 MG: 60 CAPSULE, EXTENDED RELEASE ORAL at 07:20

## 2024-07-21 RX ADMIN — WATER 2000 MG: 1 INJECTION INTRAMUSCULAR; INTRAVENOUS; SUBCUTANEOUS at 20:31

## 2024-07-21 RX ADMIN — SENNOSIDES AND DOCUSATE SODIUM 1 TABLET: 50; 8.6 TABLET ORAL at 20:31

## 2024-07-21 RX ADMIN — AMLODIPINE BESYLATE 10 MG: 10 TABLET ORAL at 07:18

## 2024-07-21 RX ADMIN — ACETAMINOPHEN 650 MG: 325 TABLET ORAL at 13:07

## 2024-07-21 RX ADMIN — SODIUM CHLORIDE, PRESERVATIVE FREE 10 ML: 5 INJECTION INTRAVENOUS at 20:32

## 2024-07-21 RX ADMIN — DULOXETINE HYDROCHLORIDE 30 MG: 30 CAPSULE, DELAYED RELEASE ORAL at 07:19

## 2024-07-21 ASSESSMENT — PAIN SCALES - GENERAL: PAINLEVEL_OUTOF10: 3

## 2024-07-21 NOTE — PLAN OF CARE
Problem: Discharge Planning  Goal: Discharge to home or other facility with appropriate resources  Outcome: Progressing  Flowsheets (Taken 7/20/2024 1958)  Discharge to home or other facility with appropriate resources:   Identify barriers to discharge with patient and caregiver   Arrange for needed discharge resources and transportation as appropriate   Identify discharge learning needs (meds, wound care, etc)   Refer to discharge planning if patient needs post-hospital services based on physician order or complex needs related to functional status, cognitive ability or social support system     Problem: Safety - Adult  Goal: Free from fall injury  Outcome: Progressing  Flowsheets (Taken 7/20/2024 2051)  Free From Fall Injury: Instruct family/caregiver on patient safety     Problem: ABCDS Injury Assessment  Goal: Absence of physical injury  Outcome: Progressing  Flowsheets (Taken 7/20/2024 2051)  Absence of Physical Injury: Implement safety measures based on patient assessment     Problem: Skin/Tissue Integrity - Adult  Goal: Skin integrity remains intact  Outcome: Progressing  Flowsheets  Taken 7/20/2024 2051  Skin Integrity Remains Intact:   Monitor for areas of redness and/or skin breakdown   Assess vascular access sites hourly  Taken 7/20/2024 2050  Skin Integrity Remains Intact:   Assess vascular access sites hourly   Monitor for areas of redness and/or skin breakdown  Taken 7/20/2024 1958  Skin Integrity Remains Intact:   Assess vascular access sites hourly   Monitor for areas of redness and/or skin breakdown  Goal: Incisions, wounds, or drain sites healing without S/S of infection  Outcome: Progressing  Flowsheets  Taken 7/20/2024 2050  Incisions, Wounds, or Drain Sites Healing Without Sign and Symptoms of Infection:   TWICE DAILY: Assess and document dressing/incision, wound bed, drain sites and surrounding tissue   ADMISSION and DAILY: Assess and document risk factors for pressure ulcer development    TWICE DAILY: Assess and document skin integrity   Implement wound care per orders  Taken 7/20/2024 1958  Incisions, Wounds, or Drain Sites Healing Without Sign and Symptoms of Infection:   ADMISSION and DAILY: Assess and document risk factors for pressure ulcer development   TWICE DAILY: Assess and document skin integrity   TWICE DAILY: Assess and document dressing/incision, wound bed, drain sites and surrounding tissue   Implement wound care per orders     Problem: Infection - Adult  Goal: Absence of infection at discharge  Outcome: Progressing  Flowsheets (Taken 7/20/2024 1958)  Absence of infection at discharge:   Assess and monitor for signs and symptoms of infection   Monitor lab/diagnostic results   Monitor all insertion sites i.e., indwelling lines, tubes and drains   Administer medications as ordered   Instruct and encourage patient and family to use good hand hygiene technique  Goal: Absence of infection during hospitalization  Outcome: Progressing  Flowsheets (Taken 7/20/2024 1958)  Absence of infection during hospitalization:   Monitor lab/diagnostic results   Assess and monitor for signs and symptoms of infection   Monitor all insertion sites i.e., indwelling lines, tubes and drains   Administer medications as ordered   Instruct and encourage patient and family to use good hand hygiene technique     Problem: Pain  Goal: Verbalizes/displays adequate comfort level or baseline comfort level  Outcome: Progressing  Flowsheets (Taken 7/19/2024 1007 by Sturgeon, Cara B, RN)  Verbalizes/displays adequate comfort level or baseline comfort level:   Assess pain using appropriate pain scale   Administer analgesics based on type and severity of pain and evaluate response   Implement non-pharmacological measures as appropriate and evaluate response   Encourage patient to monitor pain and request assistance   Consider cultural and social influences on pain and pain management   Notify Licensed Independent Practitioner

## 2024-07-21 NOTE — PROGRESS NOTES
Department of Orthopedic Surgery  Spine Service  Shashi Salgado PA-C Progress Note        Subjective:    7/19/24  Dasha is resting in bed. Doing well. Pain controlled. Plan for OR today for lumbar wound I&D. MRI showed fluid pockets with elevated inflammation markers. Infectious disease consulted.    7/20/2024  Dasha is resting in bed.  She is doing well.  She is ambulating independently.  Cultures are negative so far.  Dr. Cochran is following.  Awaiting final antibiotic plan for discharge.    7/21/24  Dasha is resting in bed. Doing well. Pain controlled. Cultures negative. Planning possible discharge tomorrow.     Vitals  VITALS:  /69   Pulse 74   Temp 98.7 °F (37.1 °C) (Oral)   Resp 18   Ht 1.727 m (5' 8\")   Wt 132.9 kg (293 lb 1.6 oz)   SpO2 98%   BMI 44.57 kg/m²   24HR INTAKE/OUTPUT:    Intake/Output Summary (Last 24 hours) at 7/21/2024 1040  Last data filed at 7/21/2024 0840  Gross per 24 hour   Intake 1476 ml   Output 980 ml   Net 496 ml     URINARY CATHETER OUTPUT (Yanes):     DRAIN/TUBE OUTPUT:  Closed/Suction Drain Inferior;Midline Back-Output (ml): 80 ml      PHYSICAL EXAM:    Orientation:  alert and oriented to person, place and time    Incision:  Incision is well approximated. Mild drainage.     Lower Extremity Motor :  quadriceps, extensor hallucis longus, dorsiflexion, plantarflexion 5/5 bilaterally  Lower Extremity Sensory:  Intact L1-S1    Flatus:  positive    ABNORMAL EXAM FINDINGS:  none    LABS:    HgB:    Lab Results   Component Value Date/Time    HGB 9.3 07/21/2024 06:48 AM    HGB 12.1 03/18/2012 12:30 PM         ASSESSMENT AND PLAN:    Post op wound infection   POD#2 from lumbar wound I&D    1: Monitor cultures  2: Out of bed as tolerated

## 2024-07-21 NOTE — PLAN OF CARE
Problem: Discharge Planning  Goal: Discharge to home or other facility with appropriate resources  Outcome: Progressing  Flowsheets (Taken 7/21/2024 1739)  Discharge to home or other facility with appropriate resources: Identify barriers to discharge with patient and caregiver     Problem: Safety - Adult  Goal: Free from fall injury  Outcome: Progressing  Flowsheets (Taken 7/21/2024 1739)  Free From Fall Injury: Instruct family/caregiver on patient safety     Problem: Skin/Tissue Integrity - Adult  Goal: Skin integrity remains intact  Outcome: Progressing  Flowsheets (Taken 7/21/2024 1739)  Skin Integrity Remains Intact: Monitor for areas of redness and/or skin breakdown     Problem: Infection - Adult  Goal: Absence of infection at discharge  Outcome: Progressing  Flowsheets (Taken 7/21/2024 1739)  Absence of infection at discharge:   Assess and monitor for signs and symptoms of infection   Monitor lab/diagnostic results     Problem: Pain  Goal: Verbalizes/displays adequate comfort level or baseline comfort level  Outcome: Progressing  Flowsheets (Taken 7/21/2024 1739)  Verbalizes/displays adequate comfort level or baseline comfort level:   Encourage patient to monitor pain and request assistance   Assess pain using appropriate pain scale   Administer analgesics based on type and severity of pain and evaluate response   Implement non-pharmacological measures as appropriate and evaluate response

## 2024-07-21 NOTE — PROGRESS NOTES
Hospitalist Progress Note      Patient:  Dasha Garcia 59 y.o. female     Unit/Bed:-14/014-A    Date of Admission: 7/17/2024      ASSESSMENT AND PLAN    Active Problems  Postoperative wound infection of lumbar region / hx of lumbar spinal stenosis  CT lumbar spine 7/17/24 that showed:Inflammatory changes within the superficial subcutaneous fat at the incision site, with a low-density peripherally enhancing fluid collection in the deeper subcutaneous fat and paraspinous musculature. Findings may represent abscess or seroma.   Hx of Lumbar Fusion s/p L2-L5 laminectomy + spinal fusion 6/27/24 with Dr. Wang  Ortho spine has been consulted  Pt taken for I&D of lumbar wound with evacuation of deep serous fluid 7/19  ID consulted to help manage abx  A&A cultures with no growth thus far  Pain control  PT/OT  Staph lugdunense bacteremia  Noted on 1/2 blood cultures, as well as Biofire  ID consulted  Cont on Ancef. (S/p Vanc which was stopped)  7/21-final antibiotic plans per ID-okay to ambulate per orthopedic surgery      Resolved Problems  NAGMA    Chronic Conditions (reviewed and stable unless otherwise stated)  Essential hypertension  Continue home medications and monitor BP  RA  On Amjevita, cont at discharge  GERD  Protonix  Chronic normocytic anemia:   Currently stable with hemoglobin in the 9 range, continue to monitor and transfuse for hemoglobin less than 7      LDA: []CVC / []PICC / []Midline / []Yanes / [x]Drains / []Mediport / []None  Antibiotics: Ancef  Steroids: None  Labs (still needed?): [x]Yes / []No  IVF (still needed?): [x]Yes / []No    Level of care: []Step Down / [x]Med-Surg  Bed Status: [x]Inpatient / []Observation  Telemetry: []Yes / [x]No  PT/OT: [x]Yes / []No    DVT Prophylaxis: [] Lovenox / [] Heparin / [x] SCDs secondary to lumbar I&D/ [] Already on Systemic Anticoagulation / [] None   Code status: Full Code     Expected discharge date:  ?  Disposition: Home

## 2024-07-22 VITALS
OXYGEN SATURATION: 97 % | DIASTOLIC BLOOD PRESSURE: 74 MMHG | WEIGHT: 293 LBS | HEART RATE: 78 BPM | BODY MASS INDEX: 44.41 KG/M2 | RESPIRATION RATE: 16 BRPM | HEIGHT: 68 IN | SYSTOLIC BLOOD PRESSURE: 118 MMHG | TEMPERATURE: 98.3 F

## 2024-07-22 LAB
ANION GAP SERPL CALC-SCNC: 11 MEQ/L (ref 8–16)
BUN SERPL-MCNC: 10 MG/DL (ref 7–22)
CALCIUM SERPL-MCNC: 8.8 MG/DL (ref 8.5–10.5)
CHLORIDE SERPL-SCNC: 105 MEQ/L (ref 98–111)
CO2 SERPL-SCNC: 28 MEQ/L (ref 23–33)
CREAT SERPL-MCNC: 0.6 MG/DL (ref 0.4–1.2)
DEPRECATED RDW RBC AUTO: 43.4 FL (ref 35–45)
ERYTHROCYTE [DISTWIDTH] IN BLOOD BY AUTOMATED COUNT: 13 % (ref 11.5–14.5)
GFR SERPL CREATININE-BSD FRML MDRD: > 90 ML/MIN/1.73M2
GLUCOSE SERPL-MCNC: 91 MG/DL (ref 70–108)
HCT VFR BLD AUTO: 29 % (ref 37–47)
HGB BLD-MCNC: 9.3 GM/DL (ref 12–16)
MAGNESIUM SERPL-MCNC: 1.9 MG/DL (ref 1.6–2.4)
MCH RBC QN AUTO: 29.2 PG (ref 26–33)
MCHC RBC AUTO-ENTMCNC: 32.1 GM/DL (ref 32.2–35.5)
MCV RBC AUTO: 91.2 FL (ref 81–99)
PHOSPHATE SERPL-MCNC: 4.1 MG/DL (ref 2.4–4.7)
PLATELET # BLD AUTO: 305 THOU/MM3 (ref 130–400)
PMV BLD AUTO: 9.5 FL (ref 9.4–12.4)
POTASSIUM SERPL-SCNC: 4.1 MEQ/L (ref 3.5–5.2)
RBC # BLD AUTO: 3.18 MILL/MM3 (ref 4.2–5.4)
SODIUM SERPL-SCNC: 144 MEQ/L (ref 135–145)
WBC # BLD AUTO: 5 THOU/MM3 (ref 4.8–10.8)

## 2024-07-22 PROCEDURE — 2580000003 HC RX 258: Performed by: PHYSICIAN ASSISTANT

## 2024-07-22 PROCEDURE — 97162 PT EVAL MOD COMPLEX 30 MIN: CPT

## 2024-07-22 PROCEDURE — 6370000000 HC RX 637 (ALT 250 FOR IP): Performed by: PHYSICIAN ASSISTANT

## 2024-07-22 PROCEDURE — 80048 BASIC METABOLIC PNL TOTAL CA: CPT

## 2024-07-22 PROCEDURE — 85027 COMPLETE CBC AUTOMATED: CPT

## 2024-07-22 PROCEDURE — 36415 COLL VENOUS BLD VENIPUNCTURE: CPT

## 2024-07-22 PROCEDURE — 84100 ASSAY OF PHOSPHORUS: CPT

## 2024-07-22 PROCEDURE — 83735 ASSAY OF MAGNESIUM: CPT

## 2024-07-22 PROCEDURE — 6360000002 HC RX W HCPCS: Performed by: PHYSICIAN ASSISTANT

## 2024-07-22 PROCEDURE — 99239 HOSP IP/OBS DSCHRG MGMT >30: CPT | Performed by: INTERNAL MEDICINE

## 2024-07-22 PROCEDURE — 97116 GAIT TRAINING THERAPY: CPT

## 2024-07-22 RX ORDER — CEPHALEXIN 500 MG/1
500 CAPSULE ORAL 3 TIMES DAILY
Qty: 42 CAPSULE | Refills: 0 | Status: SHIPPED | OUTPATIENT
Start: 2024-07-22 | End: 2024-08-05

## 2024-07-22 RX ORDER — FLUCONAZOLE 150 MG/1
150 TABLET ORAL ONCE
Qty: 1 TABLET | Refills: 0 | Status: SHIPPED | OUTPATIENT
Start: 2024-07-22 | End: 2024-07-22

## 2024-07-22 RX ADMIN — DULOXETINE HYDROCHLORIDE 30 MG: 30 CAPSULE, DELAYED RELEASE ORAL at 09:27

## 2024-07-22 RX ADMIN — LEVOTHYROXINE SODIUM 25 MCG: 0.03 TABLET ORAL at 05:37

## 2024-07-22 RX ADMIN — SENNOSIDES AND DOCUSATE SODIUM 1 TABLET: 50; 8.6 TABLET ORAL at 09:27

## 2024-07-22 RX ADMIN — WATER 2000 MG: 1 INJECTION INTRAMUSCULAR; INTRAVENOUS; SUBCUTANEOUS at 04:06

## 2024-07-22 RX ADMIN — SODIUM CHLORIDE, PRESERVATIVE FREE 10 ML: 5 INJECTION INTRAVENOUS at 09:28

## 2024-07-22 RX ADMIN — PROPRANOLOL HYDROCHLORIDE 120 MG: 60 CAPSULE, EXTENDED RELEASE ORAL at 09:27

## 2024-07-22 RX ADMIN — AMLODIPINE BESYLATE 10 MG: 10 TABLET ORAL at 09:27

## 2024-07-22 RX ADMIN — PANTOPRAZOLE SODIUM 40 MG: 40 TABLET, DELAYED RELEASE ORAL at 05:37

## 2024-07-22 NOTE — PROGRESS NOTES
[facility]  INPATIENT PHYSICAL THERAPY  EVALUATION  [facility] 6[facility]-14[facility]014-[facility]    Time In: 0742  Time Out: 0756  Timed Code Treatment Minutes: 8 Minutes  Minutes: 14          Date: 2024  Patient Name: Dasha Garcia,  Gender:  female        MRN: [id number]  : 1965  (59 y.o.)      Referring Practitioner: Marlon Orr PA-C  Diagnosis: Abscess  Additional Pertinent Hx: 59 y.o. female with PMHx of obesity, HTN, hypothyroidism, GERD, RA, migraine, Hx of gastric bypass, anxiety/depression, recent lumbar fusion on  who presents to [facility] for evaluation of fever.  Patient had lumbar fusion and laminectomy done on 2024 by Dr. Wang.  She was seen at his clinic yesterday and had stitches removed.  Overnight she developed a fever of 102.   noted some serous discharge, she was also nauseous.  On , patient was seen by home health RN talked to Dr. RICE and decided to send patient to ED for further evaluation.  Upon arrival to ED, patient was afebrile, mildly tachycardic, CTAP showed inflammatory changes within the superficial subcutaneous fat at the incision site, may represent abscess or seroma.  Patient was given antibiotics and fluids as well as pain medication in ED.  Patient admitted to hospital services for further management. 2024  3:07 PM  Lumbar I&D  Alec Wang MD     Restrictions/Precautions:  Restrictions/Precautions: Surgical Protocols  Required Braces or Orthoses  Spinal: Lumbar Corset  Position Activity Restriction  Spinal Precautions: No Bending, No Lifting, No Twisting    Subjective:  Chart Reviewed: Yes  Patient assessed for rehabilitation services?: Yes  Subjective: RN approved session. Pt pleasant and agreeable to therapy    General:  Overall Orientation Status: Within Functional Limits  Vision: Within Functional Limits  Hearing: Within functional limits       Pain: 0/10: denies pain     Vitals: Vitals not assessed per

## 2024-07-22 NOTE — DISCHARGE INSTR - OTHER ORDERS
Daily CHG sponge bath or shower when OK with Dr Wang for one month to reduce risk of developing a surgical site infection. Continue to use incentive spirometer every 2 hours at home while awake to reduce risk of post op pneumonia

## 2024-07-22 NOTE — PROGRESS NOTES
Physician Progress Note      PATIENT:               JARRELL DUONG  CSN #:                  [id number]  :                       1965  ADMIT DATE:       2024 5:03 PM  DISCH DATE:  RESPONDING  PROVIDER #:        Chaya Dunbar MD          QUERY TEXT:    Pt admitted with post-op wound infection. Pt noted to have Temp up to 101.3,   HR up to 101, RR up to 23 with elevated CRP and PCT and positive blood   cultures. If possible, please document in the progress notes and discharge   summary if you are evaluating and /or treating any of the following:    The medical record reflects the following:  Risk Factors: lumbar surgery, purulent drainage, wound infection, blood   cultures +  Clinical Indicators: on arrival WBC 11.8, PCT 0.14, CRP 17.44, temp up to   101.3, HR , RR 16-23 with + BC x2 for gram + cocci in clusters; wound   culture + staphylococcus  Treatment: Labs, imaging, monitoring, Ancef, ID consult  Options provided:  -- Staphylococcus sepsis, present on admission  -- Staphylococcus epsis, present on admission, now resolved  -- Sepsis was ruled out  -- Other - I will add my own diagnosis  -- Disagree - Not applicable / Not valid  -- Disagree - Clinically unable to determine / Unknown  -- Refer to Clinical Documentation Reviewer    PROVIDER RESPONSE TEXT:    This patient has staphylococcus sepsis which was present on admission.    Query created by: Jennyfer Boyd on 2024 9:14 AM      Electronically signed by:  Chaya Dunbar MD 2024 11:11 AM

## 2024-07-22 NOTE — PLAN OF CARE
Problem: Discharge Planning  Goal: Discharge to home or other facility with appropriate resources  7/21/2024 2205 by Soto Pablo, RN  Outcome: Progressing  Flowsheets (Taken 7/21/2024 2015)  Discharge to home or other facility with appropriate resources:   Identify barriers to discharge with patient and caregiver   Arrange for needed discharge resources and transportation as appropriate   Identify discharge learning needs (meds, wound care, etc)   Refer to discharge planning if patient needs post-hospital services based on physician order or complex needs related to functional status, cognitive ability or social support system  7/21/2024 1739 by Shanon Melchor RN  Outcome: Progressing  Flowsheets (Taken 7/21/2024 1739)  Discharge to home or other facility with appropriate resources: Identify barriers to discharge with patient and caregiver     Problem: Safety - Adult  Goal: Free from fall injury  7/21/2024 2205 by Soto Pablo RN  Outcome: Progressing  Flowsheets (Taken 7/21/2024 1739 by Shanon Melchor RN)  Free From Fall Injury: Instruct family/caregiver on patient safety  7/21/2024 1739 by Shanon Melchor RN  Outcome: Progressing  Flowsheets (Taken 7/21/2024 1739)  Free From Fall Injury: Instruct family/caregiver on patient safety     Problem: ABCDS Injury Assessment  Goal: Absence of physical injury  Outcome: Progressing  Flowsheets (Taken 7/20/2024 2051 by Jyoti Robertson, RN)  Absence of Physical Injury: Implement safety measures based on patient assessment     Problem: Skin/Tissue Integrity - Adult  Goal: Skin integrity remains intact  7/21/2024 2205 by Soto Pablo, RN  Outcome: Progressing  Flowsheets (Taken 7/21/2024 2015)  Skin Integrity Remains Intact:   Monitor for areas of redness and/or skin breakdown   Assess vascular access sites hourly  7/21/2024 1739 by Shanon Melchor RN  Outcome: Progressing  Flowsheets (Taken 7/21/2024 1739)  Skin Integrity Remains Intact:  appropriate pain scale   Administer analgesics based on type and severity of pain and evaluate response   Implement non-pharmacological measures as appropriate and evaluate response  7/21/2024 1739 by Shanon Melchor RN  Outcome: Progressing  Flowsheets (Taken 7/21/2024 1739)  Verbalizes/displays adequate comfort level or baseline comfort level:   Encourage patient to monitor pain and request assistance   Assess pain using appropriate pain scale   Administer analgesics based on type and severity of pain and evaluate response   Implement non-pharmacological measures as appropriate and evaluate response   Careplan reviewed with patient. Patient verbalizes understanding of care plan and contributes towards goals of care.      Patient educated on how to use incentive spirometer. Patient verbalized understanding and demonstrated proper use. Emphasized importance and usage of device, with coughing and deep breathing every 2 hours while awake.

## 2024-07-22 NOTE — DISCHARGE SUMMARY
Hospitalist discharge note      Patient:  Dasha Garcia 59 y.o. female     Unit/Bed:6A-14/014-A    Date of Admission: 7/17/2024      ASSESSMENT AND PLAN    Active Problems  Postoperative wound infection of lumbar region / hx of lumbar spinal stenosis  CT lumbar spine 7/17/24 that showed:Inflammatory changes within the superficial subcutaneous fat at the incision site, with a low-density peripherally enhancing fluid collection in the deeper subcutaneous fat and paraspinous musculature. Findings may represent abscess or seroma.   Hx of Lumbar Fusion s/p L2-L5 laminectomy + spinal fusion 6/27/24 with Dr. Wang  Ortho spine has been consulted  Pt taken for I&D of lumbar wound with evacuation of deep serous fluid 7/19  ID consulted to help manage abx  Anaerobic aerobic postoperative cultures that show Staphylococcus as well as corny bacterium-informed ID  Pain control  PT/OT  Staph lugdunense bacteremia  Noted on 1/2 blood cultures, as well as Biofire  ID consulted  Cont on Ancef. (S/p Vanc which was stopped)  7/21-final antibiotic plans per ID-okay to ambulate per orthopedic surgery  7/22-planning to discharge on 2 weeks on Keflex as per ID recommendations-prescriptions given by them-okay by orthopedic surgery for discharge today      Resolved Problems  NAGMA    Chronic Conditions (reviewed and stable unless otherwise stated)  Essential hypertension  Continue home medications and monitor BP  RA  On Amjevita, cont at discharge  GERD  Protonix  Chronic normocytic anemia:   Currently stable with hemoglobin in the 9 range, continue to monitor and transfuse for hemoglobin less than 7    Stable for home today per ID as well as orthopedic physician, antibiotics oral given for 2 more weeks as per ID recommendations    PCP in 1 week    ID and orthopedic for physician follow-up as outpatient as scheduled    Discharge time 35 minutes     ===================================================================    Chief  the skin surface at the L1 level, contacting the L1 spinous process tip. There is a Y shaped midline fluid collection measuring 3.5 x 5.6 x 3.0 cm extending from the L4-L5 incision to the L5 laminectomy defect. Visualized cord and cauda equina normal in signal and morphology. No cord compression. No epidural collection to suggest abscess or hematoma. L1-L2: No significant canal or foraminal stenosis. L2-L3: No significant canal stenosis. Neural foramina obscured by susceptibility artifact from metallic hardware. L3-L4: No significant canal stenosis. Neural foramina obscured by susceptibility artifact from metallic hardware. L4-L5: No significant canal stenosis. Neural foramina obscured by susceptibility artifact from metallic hardware. L5-S1: No significant canal stenosis. Neural foramina obscured by susceptibility artifact from metallic hardware.     Posterior decompression and fusion changes from L2 through L5 with subcutaneous fluid and two discrete collections, most likely seromas in the recent postoperative setting however abscess not completely excludable. Negative for acute osteomyelitis. Negative for epidural abscess or hematoma. No high-grade canal stenosis. Neural foramina obscured by susceptibility artifact from metallic hardware at most levels. This document has been electronically signed by: Awilda Martinez MD on 07/19/2024 04:33 AM    CT LUMBAR SPINE W CONTRAST    Result Date: 7/17/2024  CT lumbar spine with contrast Comparison: CR/SR - XR LUMBAR SPINE (2-3 VIEWS) - 06/26/2024 12:26 PM EDT Findings: Postsurgical changes of L2-L5 posterior fixation by means of bilateral rods and pedicle screws. No abnormal lucency surrounding the hardware. The hardware is intact. Alignment is normal. Vertebral body heights maintained. Moderate degenerative changes from L2-L3 through L5-S1 are noted, similar to 06/26/2024 radiographic exam. In the paraspinous soft tissues, both within the subcutaneous fat adjacent to

## 2024-07-22 NOTE — DISCHARGE INSTR - ACTIVITY
Continue to be ambulating at home, continue to wear brace when up. Continue outpatient PT/OT as prior to admission

## 2024-07-22 NOTE — DISCHARGE INSTRUCTIONS
Skin Abscess: Care Instructions  Overview     A skin abscess is a bacterial infection that forms a pocket of pus. A boil is a kind of skin abscess. The doctor may have cut an opening in the abscess so that the pus can drain out. You may have gauze in the cut so that the abscess will stay open and keep draining. You may need antibiotics. You will need to follow up with your doctor to make sure the infection has gone away.  The doctor has checked you carefully, but problems can develop later. If you notice any problems or new symptoms, get medical treatment right away.  Follow-up care is a key part of your treatment and safety. Be sure to make and go to all appointments, and call your doctor if you are having problems. It's also a good idea to know your test results and keep a list of the medicines you take.  How can you care for yourself at home?  Apply warm and dry compresses, a heating pad set on low, or a hot water bottle 3 or 4 times a day for pain. Keep a cloth between the heat source and your skin.  If your doctor prescribed antibiotics, take them as directed. Do not stop taking them just because you feel better. You need to take the full course of antibiotics.  Take pain medicines exactly as directed.  If the doctor gave you a prescription medicine for pain, take it as prescribed.  If you are not taking a prescription pain medicine, ask your doctor if you can take an over-the-counter medicine.  Keep your bandage clean and dry. Change the bandage whenever it gets wet or dirty, or at least one time a day.  If the abscess was packed with gauze:  Keep follow-up appointments to have the gauze changed or removed. If the doctor instructed you to remove the gauze, follow the instructions you were given for how to remove it.  After the gauze is removed, soak the area in warm water for 15 to 20 minutes 2 times a day, until the wound closes.  When should you call for help?   Call your doctor now or seek immediate

## 2024-07-22 NOTE — PROGRESS NOTES
ID Progress Note    Patient - Dasha Garcia,  Age - 59 y.o.    - 1965      Room Number - 6A-14/014-A   MRN -  [id number]   [facility] # - [id number]  Date of Admission -  2024  5:03 PM    SUBJECTIVE:   No fevers or chills overnight    OBJECTIVE   VITALS    height is 1.727 m (5' 8\") and weight is 132.9 kg (293 lb 1.6 oz). Her oral temperature is 98.4 °F (36.9 °C). Her blood pressure is 135/77 and her pulse is 81. Her respiration is 16 and oxygen saturation is 98%.       Wt Readings from Last 3 Encounters:   24 132.9 kg (293 lb 1.6 oz)   24 133.8 kg (295 lb)   24 134.1 kg (295 lb 9.6 oz)       I/O (24 Hours)    Intake/Output Summary (Last 24 hours) at 2024 0837  Last data filed at 2024 0419  Gross per 24 hour   Intake 1476.65 ml   Output 0 ml   Net 1476.65 ml       General Appearance  Awake, alert, oriented,  not  In acute distress  HEENT - normocephalic, atraumatic, pink conjunctiva,  anicteric sclera  Neck - Supple, no mass  Lungs -  Bilateral   air entry, no rhonchi, no wheeze  Cardiovascular - Heart sounds are normal.  Regular rate and rhythm without murmur, gallop or rub.  Abdomen - soft, not distended, nontender, no organomegally,  Neurologic -cranial nerves grossly intact  Skin - No bruising or bleeding  Extremities - No edema, no cyanosis, clubbing     MEDICATIONS:      sodium chloride flush  5-40 mL IntraVENous 2 times per day    polyethylene glycol  17 g Oral Daily    bisacodyl  5 mg Oral Daily    sennosides-docusate sodium  1 tablet Oral BID    ceFAZolin  2,000 mg IntraVENous Q8H    amLODIPine  10 mg Oral Daily    DULoxetine  30 mg Oral Daily    propranolol  120 mg Oral Daily    levothyroxine  25 mcg Oral Daily    pantoprazole  40 mg Oral QAM AC    sodium chloride flush  5-40 mL IntraVENous 2 times per day    gabapentin  100 mg Oral Nightly      sodium chloride Stopped (24 1439)    sodium chloride      sodium chloride       hydrALAZINE, sodium chloride  flush, sodium chloride, magnesium hydroxide, bisacodyl, oxyCODONE-acetaminophen **OR** oxyCODONE-acetaminophen, sodium chloride flush, sodium chloride, ondansetron **OR** ondansetron, polyethylene glycol, acetaminophen **OR** acetaminophen, melatonin, morphine **OR** morphine, cyclobenzaprine  LABS:   CBC   Recent Labs     07/22/24  0605   WBC 5.0   HGB 9.3*   HCT 29.0*   MCV 91.2        BMP:   Recent Labs     07/22/24  0605      K 4.1      CO2 28   BUN 10   CREATININE 0.6   GLUCOSE 91   MG 1.9   PHOS 4.1     LIVER PROFILE No results for input(s): \"AST\", \"ALT\", \"LIPASE\", \"AMYLASE\", \"BILIDIR\", \"BILITOT\", \"ALKPHOS\" in the last 72 hours.    Invalid input(s): \"ALB\"  INR No results for input(s): \"INR\" in the last 72 hours.  PTT No results found for: \"APTT\"    CULTURES:   UA: No results for input(s): \"SPECGRAV\", \"PHUR\", \"COLORU\", \"CLARITYU\", \"MUCUS\", \"PROTEINU\", \"BLOODU\", \"RBCUA\", \"WBCUA\", \"BACTERIA\", \"NITRU\", \"GLUCOSEU\", \"BILIRUBINUR\", \"UROBILINOGEN\", \"KETUA\", \"LABCAST\", \"LABCASTTY\", \"AMORPHOS\" in the last 72 hours.    Invalid input(s): \"CRYSTALS\"  Micro:   Lab Results   Component Value Date/Time    BC No growth 24 hours. 07/17/2024 06:08 PM    BC  07/17/2024 06:08 PM     possible contamination; clinical correlation required         Impression and Recommendation:     Patient Active Problem List   Diagnosis Code    Iron deficiency anemia, unspecified D50.9    Malabsorption K90.9    S/P cervical spinal fusion Z98.1    Spinal stenosis of lumbar region with neurogenic claudication M48.062    Abscess L02.91    Rheumatoid arthritis (HCC) M06.9    Hypertension I10    Postoperative infection T81.40XA     Assessment and plan:     Postoperative wound infection of lumbar region with history of lumbar spinal stenosis: CT lumbar spine 7/17 showing inflammatory changes within the specific facial subcutaneous fat from the incision site in the low back with fluid collection in the deeper subcutaneous fat and

## 2024-07-22 NOTE — PROGRESS NOTES
Department of Orthopedic Surgery  Spine Service  Shashi Salgado PA-C Progress Note        Subjective:    7/19/24  Dasha is resting in bed. Doing well. Pain controlled. Plan for OR today for lumbar wound I&D. MRI showed fluid pockets with elevated inflammation markers. Infectious disease consulted.    7/20/2024  Dasha is resting in bed.  She is doing well.  She is ambulating independently.  Cultures are negative so far.  Dr. Cochran is following.  Awaiting final antibiotic plan for discharge.    7/21/24  Dasha is resting in bed. Doing well. Pain controlled. Cultures negative. Planning possible discharge tomorrow.     7/22/24  Dasha is resting in bed. Doing well. Cultures growing mixed, possible contaminant. Will await final antibiotic plan for discharge.     Vitals  VITALS:  /77   Pulse 81   Temp 98.4 °F (36.9 °C) (Oral)   Resp 16   Ht 1.727 m (5' 8\")   Wt 132.9 kg (293 lb 1.6 oz)   SpO2 98%   BMI 44.57 kg/m²   24HR INTAKE/OUTPUT:    Intake/Output Summary (Last 24 hours) at 7/22/2024 0824  Last data filed at 7/22/2024 0419  Gross per 24 hour   Intake 1476.65 ml   Output 0 ml   Net 1476.65 ml     URINARY CATHETER OUTPUT (Yanes):     DRAIN/TUBE OUTPUT:  Closed/Suction Drain Inferior;Midline Back-Output (ml): 0 ml      PHYSICAL EXAM:    Orientation:  alert and oriented to person, place and time    Incision:  Incision is well approximated. Mild drainage.     Lower Extremity Motor :  quadriceps, extensor hallucis longus, dorsiflexion, plantarflexion 5/5 bilaterally  Lower Extremity Sensory:  Intact L1-S1    Flatus:  positive    ABNORMAL EXAM FINDINGS:  none    LABS:    HgB:    Lab Results   Component Value Date/Time    HGB 9.3 07/22/2024 06:05 AM    HGB 12.1 03/18/2012 12:30 PM         ASSESSMENT AND PLAN:    Post op wound infection   POD#2 from lumbar wound I&D    1: Monitor cultures  2: Out of bed as tolerated  3: Plan discharge home today.   4: Fluconazole for prophylaxis with long term antibiotics.

## 2024-07-22 NOTE — CARE COORDINATION
7/22/24, 11:34 AM EDT    Patient goals/plan/ treatment preferences discussed by  and .  Patient goals/plan/ treatment preferences reviewed with patient/ family.  Patient/ family verbalize understanding of discharge plan and are in agreement with goal/plan/treatment preferences.  Understanding was demonstrated using the teach back method.  AVS provided by RN at time of discharge, which includes all necessary medical information pertaining to the patients current course of illness, treatment, post-discharge goals of care, and treatment preferences.     Services At/After Discharge: Home Health [facility]    Call to [facility], spoke with Jackie, they will need specifics of dressing needing to be done, reports they cannot do daily dressing changes. SW updated provider on HH unable to do daily changes, they did update HH orders. SW met with Dasha, updated on daily dressing changes needs, HH unable to do daily, but can teach family/friend. Pt reports her  will be able to help.

## 2024-07-22 NOTE — DISCHARGE INSTR - DIET
Good nutrition is important when healing from an illness, injury, or surgery.  Follow any nutrition recommendations given to you during your hospital stay.   If you were given an oral nutrition supplement while in the hospital, continue to take this supplement at home.  You can take it with meals, in-between meals, and/or before bedtime. These supplements can be purchased at most local grocery stores, pharmacies, and chain [employer]-stores.   If you have any questions about your diet or nutrition, call the hospital and ask for the dietitian.  Regular diet as tolerated, drink extra fluids

## 2024-07-23 NOTE — CARE COORDINATION
7/23/24, 7:15 AM EDT    Patient goals/plan/ treatment preferences discussed by  and .  Patient goals/plan/ treatment preferences reviewed with patient/ family.  Patient/ family verbalize understanding of discharge plan and are in agreement with goal/plan/treatment preferences.  Understanding was demonstrated using the teach back method.  AVS provided by RN at time of discharge, which includes all necessary medical information pertaining to the patients current course of illness, treatment, post-discharge goals of care, and treatment preferences.     Services At/After Discharge: None

## 2024-07-24 LAB
BACTERIA SPEC AEROBE CULT: NORMAL
BACTERIA SPEC AEROBE CULT: NORMAL
BACTERIA SPEC ANAEROBE CULT: NORMAL
BACTERIA SPEC ANAEROBE CULT: NORMAL
GRAM STN SPEC: NORMAL
GRAM STN SPEC: NORMAL

## (undated) DEVICE — SET AUTOTRNS C175ML BOWL BTM OUTLT RESERVOIRXTRA

## (undated) DEVICE — CATHETER IV 16GA L1.16IN OD1.7018-1.7780MM

## (undated) DEVICE — Device: Brand: FABCO

## (undated) DEVICE — SPONGE DRN W4XL4IN RAYON/POLYESTER 6 PLY NONWOVEN PRECUT 2 PER PK

## (undated) DEVICE — 3M™ TEGADERM™ CHG CHLORHEXIDINE GLUCONATE I.V. PORT DRESSING STERILE U.S. ONLY 25/CARTON 4 CARTONS/CASE 1665: Brand: TEGADERM™

## (undated) DEVICE — DRESSING TRNSPAR W8XL12IN FLM SURESITE 123

## (undated) DEVICE — DRAIN SURG 10FR PVC TB W/ TRCR MID PERF NO RESVR HUBLESS

## (undated) DEVICE — SUTURE ETHILON SZ 1-0 L30IN NONABSORBABLE BLK LR L75MM 3/8 489T

## (undated) DEVICE — ADMIRAL ACP DRILL, 12MM: Brand: ADMIRAL

## (undated) DEVICE — MASTISOL ADHESIVE LIQ 2/3ML

## (undated) DEVICE — PACK-MAJOR

## (undated) DEVICE — SPONGE,PEANUT,XRAY,ST,SM,3/8",5/CARD: Brand: MEDLINE INDUSTRIES, INC.

## (undated) DEVICE — SPONGE LAP W18XL18IN WHT COT 4 PLY FLD STRUNG RADPQ DISP ST 2 PER PACK

## (undated) DEVICE — BUR RND FLUT AGRSV 5MM

## (undated) DEVICE — SUTURE VICRYL + SZ 2-0 L27IN ABSRB UD CP-1 1/2 CIR REV CUT VCP266H

## (undated) DEVICE — 3M™ STERI-STRIP™ COMPOUND BENZOIN TINCTURE 40 BAGS/CARTON 4 CARTONS/CASE C1544: Brand: 3M™ STERI-STRIP™

## (undated) DEVICE — RESERVOIR BLD COLLCTN BTM OUTLETXTRAXRES

## (undated) DEVICE — Device

## (undated) DEVICE — PREMIUM DRY TRAY LF: Brand: MEDLINE INDUSTRIES, INC.

## (undated) DEVICE — LINE ASPIR 1/4 ANTICOAG

## (undated) DEVICE — GLOVE SURG SZ 65 THK91MIL LTX FREE SYN POLYISOPRENE

## (undated) DEVICE — HEAD POSITIONER, SURGICAL: Brand: DEROYAL

## (undated) DEVICE — STRIP,CLOSURE,WOUND,MEDI-STRIP,1/2X4: Brand: MEDLINE

## (undated) DEVICE — SUTURE ABSORBABLE MONOFILAMENT 2-0 CT-1 24 CM 36 MM VIO PDS+

## (undated) DEVICE — BIPOLAR SEALER 23-112-1 AQM 6.0: Brand: AQUAMANTYS ®

## (undated) DEVICE — 450 ML BOTTLE OF 0.05% CHLORHEXIDINE GLUCONATE IN 99.95% STERILE WATER FOR IRRIGATION, USP AND APPLICATOR.: Brand: IRRISEPT ANTIMICROBIAL WOUND LAVAGE

## (undated) DEVICE — SUTURE VICRYL ABSRB BRAID COAT UD CP NO 2 27IN  J195H

## (undated) DEVICE — SOLUTION SCRB 4OZ 7.5% POVIDONE IOD ANTIMIC BTL

## (undated) DEVICE — 1LYRTR 16FR10ML100%SILTMPS SNP: Brand: MEDLINE INDUSTRIES, INC.

## (undated) DEVICE — GOWN,SIRUS,NON REINFRCD,LARGE,SET IN SL: Brand: MEDLINE

## (undated) DEVICE — ELECTRODE PT RET AD L9FT HI MOIST COND ADH HYDRGEL CORDED

## (undated) DEVICE — APPLICATOR MEDICATED 10.5 CC SOLUTION HI LT ORNG CHLORAPREP

## (undated) DEVICE — BUR CUT RND FLUT AGRSV 4.0MM

## (undated) DEVICE — TUBING, SUCTION, 1/4" X 20', STRAIGHT: Brand: MEDLINE INDUSTRIES, INC.

## (undated) DEVICE — SUTURE 1 36 VIO MONO PDS PDP371T

## (undated) DEVICE — EVACUATOR SURG 100CC SIL BLB SUCT RESVR FOR CLS WND DRNGE

## (undated) DEVICE — THE MILL DISPOSABLE - MEDIUM

## (undated) DEVICE — AGENT HEMOSTATIC SURGIFLOW MATRIX KIT W/THROMBIN

## (undated) DEVICE — DRESSING TRNSPAR W5XL4.5IN FLM SHT SEMIPERMEABLE WIND

## (undated) DEVICE — SOLUTION PREP PAINT POV IOD FOR SKIN MUCOUS MEM

## (undated) DEVICE — PAD OP RM W20XL72XH2IN PRECIS CUT FLAT EC BIOCLINIC

## (undated) DEVICE — SUTURE MCRYL SZ 4-0 L27IN ABSRB UD L19MM PS-2 1/2 CIR PRIM Y426H

## (undated) DEVICE — SUTURE VCRL SZ 3-0 L18IN ABSRB VLT SUTUPAK PRECUT W/O NDL J104T

## (undated) DEVICE — COLLAR CERV UNIV AD L13-19IN TRACH OPN TWO PC RIG POLYETH

## (undated) DEVICE — SUTURE N ABSRB MONOFILAMENT 2-0 FSLX 75 CM 36 MM ETHILON

## (undated) DEVICE — BLADE ES L6IN ELASTOMERIC COAT EXT DURABLE BEND UPTO 90DEG

## (undated) DEVICE — GOWN,SIRUS,NONRNF,SETINSLV,XL,20/CS: Brand: MEDLINE

## (undated) DEVICE — SUTURE ETHILON SZ 3-0 L18IN NONABSORBABLE BLK L24MM FS-1 3/8 663G

## (undated) DEVICE — DRAIN SURG FLAT W7MMXL20CM FULL PERF

## (undated) DEVICE — DRESSING HYDROFIBER AQUACEL AG ADVANTAGE 3.5X12 IN

## (undated) DEVICE — KIT EVAC 400CC PVC RADPQ Y CONN

## (undated) DEVICE — SPK10281 JACKSON TABLE KIT: Brand: SPK10281 JACKSON TABLE KIT

## (undated) DEVICE — SUTURE VCRL SZ 3-0 L27IN ABSRB UD FS-2 L19MM 1/2 CIR J423H

## (undated) DEVICE — SPONGE GZ W4XL4IN COT 12 PLY TYP VII WVN C FLD DSGN STERILE

## (undated) DEVICE — SPONGE,NEURO,1"X1",XR,STRL,LF,10/PK: Brand: MEDLINE

## (undated) DEVICE — GLOVE ORANGE PI 7   MSG9070

## (undated) DEVICE — SUTURE ETHLN SZ 3-0 L18IN NONABSORBABLE BLK FS-1 L24MM 3/8 663H

## (undated) DEVICE — SUTURE STRATAFIX SYMMETRIC SZ 1 L18IN ABSRB VLT CT1 L36CM SXPP1A404

## (undated) DEVICE — SUTURE ETHILON SZ 3-0 L30IN NONABSORBABLE BLK L30MM PSLX 3/8 1691H

## (undated) DEVICE — DRAPE MICSCP W132XL406CM LENS DIA68MM W VARI LENS2 FOR LEICA

## (undated) DEVICE — CODMAN® SURGICAL PATTIES 1/2" X 1/2" (1.27CM X 1.27CM): Brand: CODMAN®

## (undated) DEVICE — PROBE STIM 3 MM FOR PEDCL SCREW DISP

## (undated) DEVICE — GLOVE SURG SZ 9 THK91MIL LTX FREE SYN POLYISOPRENE ANTI